# Patient Record
Sex: FEMALE | Race: WHITE | NOT HISPANIC OR LATINO | Employment: OTHER | URBAN - METROPOLITAN AREA
[De-identification: names, ages, dates, MRNs, and addresses within clinical notes are randomized per-mention and may not be internally consistent; named-entity substitution may affect disease eponyms.]

---

## 2019-03-14 ENCOUNTER — HOSPITAL ENCOUNTER (OUTPATIENT)
Dept: RADIOLOGY | Facility: MEDICAL CENTER | Age: 74
End: 2019-03-14

## 2021-11-21 ENCOUNTER — HOSPITAL ENCOUNTER (INPATIENT)
Facility: MEDICAL CENTER | Age: 76
LOS: 9 days | DRG: 552 | End: 2021-11-30
Attending: EMERGENCY MEDICINE | Admitting: INTERNAL MEDICINE
Payer: MEDICARE

## 2021-11-21 ENCOUNTER — APPOINTMENT (OUTPATIENT)
Dept: RADIOLOGY | Facility: MEDICAL CENTER | Age: 76
DRG: 552 | End: 2021-11-21
Attending: INTERNAL MEDICINE
Payer: MEDICARE

## 2021-11-21 ENCOUNTER — HOSPITAL ENCOUNTER (OUTPATIENT)
Dept: RADIOLOGY | Facility: MEDICAL CENTER | Age: 76
DRG: 552 | End: 2021-11-21
Attending: PHYSICIAN ASSISTANT
Payer: MEDICARE

## 2021-11-21 DIAGNOSIS — M54.50 ACUTE MIDLINE LOW BACK PAIN WITHOUT SCIATICA: ICD-10-CM

## 2021-11-21 DIAGNOSIS — R26.2 UNABLE TO AMBULATE: ICD-10-CM
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
## 2021-11-21 DIAGNOSIS — K59.81 OGILVIE SYNDROME: ICD-10-CM

## 2021-11-21 DIAGNOSIS — E87.1 HYPONATREMIA: ICD-10-CM

## 2021-11-21 DIAGNOSIS — M46.96 INFLAMMATORY SPONDYLOPATHY OF LUMBAR REGION (HCC): ICD-10-CM

## 2021-11-21 DIAGNOSIS — S32.010A COMPRESSION FRACTURE OF L1 VERTEBRA, INITIAL ENCOUNTER (HCC): ICD-10-CM

## 2021-11-21 DIAGNOSIS — R73.9 HYPERGLYCEMIA: ICD-10-CM

## 2021-11-21 DIAGNOSIS — R74.01 TRANSAMINITIS: ICD-10-CM

## 2021-11-21 DIAGNOSIS — M47.896 OTHER SPONDYLOSIS, LUMBAR REGION: ICD-10-CM

## 2021-11-21 DIAGNOSIS — S32.010A CLOSED COMPRESSION FRACTURE OF BODY OF L1 VERTEBRA (HCC): ICD-10-CM

## 2021-11-21 DIAGNOSIS — M43.16 SPONDYLOLISTHESIS OF LUMBAR REGION: ICD-10-CM

## 2021-11-21 DIAGNOSIS — S32.010A CLOSED WEDGE COMPRESSION FRACTURE OF L1 VERTEBRA, INITIAL ENCOUNTER (HCC): ICD-10-CM

## 2021-11-21 DIAGNOSIS — F03.90 DEMENTIA WITHOUT BEHAVIORAL DISTURBANCE, UNSPECIFIED DEMENTIA TYPE: ICD-10-CM

## 2021-11-21 DIAGNOSIS — M54.50 LOW BACK PAIN, UNSPECIFIED BACK PAIN LATERALITY, UNSPECIFIED CHRONICITY, UNSPECIFIED WHETHER SCIATICA PRESENT: ICD-10-CM

## 2021-11-21 DIAGNOSIS — M80.08XA AGE-RELATED OSTEOPOROSIS WITH CURRENT PATHOLOGICAL FRACTURE OF VERTEBRA, INITIAL ENCOUNTER (HCC): ICD-10-CM

## 2021-11-21 DIAGNOSIS — E11.9 TYPE 2 DIABETES MELLITUS WITHOUT COMPLICATION, WITHOUT LONG-TERM CURRENT USE OF INSULIN (HCC): ICD-10-CM

## 2021-11-21 PROBLEM — K59.00 CN (CONSTIPATION): Status: ACTIVE | Noted: 2021-11-21

## 2021-11-21 LAB
ALBUMIN SERPL BCP-MCNC: 4.3 G/DL (ref 3.2–4.9)
ALBUMIN/GLOB SERPL: 1.3 G/DL
ALP SERPL-CCNC: 105 U/L (ref 30–99)
ALT SERPL-CCNC: 62 U/L (ref 2–50)
ANION GAP SERPL CALC-SCNC: 15 MMOL/L (ref 7–16)
APPEARANCE UR: CLEAR
AST SERPL-CCNC: 47 U/L (ref 12–45)
BACTERIA #/AREA URNS HPF: ABNORMAL /HPF
BASOPHILS # BLD AUTO: 0.5 % (ref 0–1.8)
BASOPHILS # BLD: 0.08 K/UL (ref 0–0.12)
BILIRUB SERPL-MCNC: 0.4 MG/DL (ref 0.1–1.5)
BILIRUB UR QL STRIP.AUTO: NEGATIVE
BUN SERPL-MCNC: 43 MG/DL (ref 8–22)
CALCIUM SERPL-MCNC: 9.9 MG/DL (ref 8.4–10.2)
CHLORIDE SERPL-SCNC: 90 MMOL/L (ref 96–112)
CO2 SERPL-SCNC: 20 MMOL/L (ref 20–33)
COLOR UR: YELLOW
CREAT SERPL-MCNC: 0.87 MG/DL (ref 0.5–1.4)
EOSINOPHIL # BLD AUTO: 0.08 K/UL (ref 0–0.51)
EOSINOPHIL NFR BLD: 0.5 % (ref 0–6.9)
EPI CELLS #/AREA URNS HPF: ABNORMAL /HPF
ERYTHROCYTE [DISTWIDTH] IN BLOOD BY AUTOMATED COUNT: 41.2 FL (ref 35.9–50)
GLOBULIN SER CALC-MCNC: 3.3 G/DL (ref 1.9–3.5)
GLUCOSE SERPL-MCNC: 204 MG/DL (ref 65–99)
GLUCOSE UR STRIP.AUTO-MCNC: NEGATIVE MG/DL
HCT VFR BLD AUTO: 44.4 % (ref 37–47)
HGB BLD-MCNC: 15 G/DL (ref 12–16)
HYALINE CASTS #/AREA URNS LPF: ABNORMAL /LPF
IMM GRANULOCYTES # BLD AUTO: 0.11 K/UL (ref 0–0.11)
IMM GRANULOCYTES NFR BLD AUTO: 0.7 % (ref 0–0.9)
KETONES UR STRIP.AUTO-MCNC: ABNORMAL MG/DL
LEUKOCYTE ESTERASE UR QL STRIP.AUTO: NEGATIVE
LYMPHOCYTES # BLD AUTO: 1.7 K/UL (ref 1–4.8)
LYMPHOCYTES NFR BLD: 10.8 % (ref 22–41)
MCH RBC QN AUTO: 31.8 PG (ref 27–33)
MCHC RBC AUTO-ENTMCNC: 33.8 G/DL (ref 33.6–35)
MCV RBC AUTO: 94.1 FL (ref 81.4–97.8)
MICRO URNS: ABNORMAL
MONOCYTES # BLD AUTO: 0.92 K/UL (ref 0–0.85)
MONOCYTES NFR BLD AUTO: 5.9 % (ref 0–13.4)
MUCOUS THREADS #/AREA URNS HPF: ABNORMAL /HPF
NEUTROPHILS # BLD AUTO: 12.8 K/UL (ref 2–7.15)
NEUTROPHILS NFR BLD: 81.6 % (ref 44–72)
NITRITE UR QL STRIP.AUTO: NEGATIVE
NRBC # BLD AUTO: 0 K/UL
NRBC BLD-RTO: 0 /100 WBC
PH UR STRIP.AUTO: 5.5 [PH] (ref 5–8)
PLATELET # BLD AUTO: 495 K/UL (ref 164–446)
PMV BLD AUTO: 9 FL (ref 9–12.9)
POTASSIUM SERPL-SCNC: 3.9 MMOL/L (ref 3.6–5.5)
PROT SERPL-MCNC: 7.6 G/DL (ref 6–8.2)
PROT UR QL STRIP: 100 MG/DL
RBC # BLD AUTO: 4.72 M/UL (ref 4.2–5.4)
RBC # URNS HPF: ABNORMAL /HPF
RBC UR QL AUTO: NEGATIVE
SODIUM SERPL-SCNC: 125 MMOL/L (ref 135–145)
SP GR UR STRIP.AUTO: >=1.03
T4 FREE SERPL-MCNC: 1.51 NG/DL (ref 0.93–1.7)
TSH SERPL DL<=0.005 MIU/L-ACNC: 1.64 UIU/ML (ref 0.38–5.33)
WBC # BLD AUTO: 15.7 K/UL (ref 4.8–10.8)
WBC #/AREA URNS HPF: ABNORMAL /HPF

## 2021-11-21 PROCEDURE — 770006 HCHG ROOM/CARE - MED/SURG/GYN SEMI*

## 2021-11-21 PROCEDURE — A9270 NON-COVERED ITEM OR SERVICE: HCPCS | Performed by: INTERNAL MEDICINE

## 2021-11-21 PROCEDURE — 700102 HCHG RX REV CODE 250 W/ 637 OVERRIDE(OP): Performed by: INTERNAL MEDICINE

## 2021-11-21 PROCEDURE — 84439 ASSAY OF FREE THYROXINE: CPT

## 2021-11-21 PROCEDURE — 99220 PR INITIAL OBSERVATION CARE,LEVL III: CPT | Performed by: INTERNAL MEDICINE

## 2021-11-21 PROCEDURE — 36415 COLL VENOUS BLD VENIPUNCTURE: CPT

## 2021-11-21 PROCEDURE — 700105 HCHG RX REV CODE 258: Performed by: INTERNAL MEDICINE

## 2021-11-21 PROCEDURE — 85025 COMPLETE CBC W/AUTO DIFF WBC: CPT

## 2021-11-21 PROCEDURE — 99285 EMERGENCY DEPT VISIT HI MDM: CPT

## 2021-11-21 PROCEDURE — 81001 URINALYSIS AUTO W/SCOPE: CPT

## 2021-11-21 PROCEDURE — 72148 MRI LUMBAR SPINE W/O DYE: CPT | Mod: MH

## 2021-11-21 PROCEDURE — 71045 X-RAY EXAM CHEST 1 VIEW: CPT

## 2021-11-21 PROCEDURE — 80053 COMPREHEN METABOLIC PANEL: CPT

## 2021-11-21 PROCEDURE — 700105 HCHG RX REV CODE 258: Performed by: EMERGENCY MEDICINE

## 2021-11-21 PROCEDURE — 84443 ASSAY THYROID STIM HORMONE: CPT

## 2021-11-21 RX ORDER — METOPROLOL SUCCINATE 25 MG/1
25 TABLET, EXTENDED RELEASE ORAL DAILY
Status: DISCONTINUED | OUTPATIENT
Start: 2021-11-22 | End: 2021-11-28

## 2021-11-21 RX ORDER — ACETAMINOPHEN 500 MG
1000 TABLET ORAL EVERY 6 HOURS
Status: DISCONTINUED | OUTPATIENT
Start: 2021-11-21 | End: 2021-11-22

## 2021-11-21 RX ORDER — ACETAMINOPHEN 500 MG
1000 TABLET ORAL EVERY 6 HOURS PRN
Status: DISCONTINUED | OUTPATIENT
Start: 2021-11-26 | End: 2021-11-22

## 2021-11-21 RX ORDER — METFORMIN HYDROCHLORIDE 500 MG/1
500 TABLET, EXTENDED RELEASE ORAL 2 TIMES DAILY WITH MEALS
Status: DISCONTINUED | OUTPATIENT
Start: 2021-11-21 | End: 2021-11-30 | Stop reason: HOSPADM

## 2021-11-21 RX ORDER — DONEPEZIL HYDROCHLORIDE 5 MG/1
5 TABLET, FILM COATED ORAL NIGHTLY
COMMUNITY
End: 2022-10-21

## 2021-11-21 RX ORDER — METAXALONE 800 MG/1
800 TABLET ORAL 3 TIMES DAILY
Status: DISCONTINUED | OUTPATIENT
Start: 2021-11-21 | End: 2021-11-21

## 2021-11-21 RX ORDER — POTASSIUM CHLORIDE 1.5 G/1.58G
10 POWDER, FOR SOLUTION ORAL DAILY
Status: DISCONTINUED | OUTPATIENT
Start: 2021-11-21 | End: 2021-11-21

## 2021-11-21 RX ORDER — POLYETHYLENE GLYCOL 3350 17 G/17G
1 POWDER, FOR SOLUTION ORAL
Status: DISCONTINUED | OUTPATIENT
Start: 2021-11-21 | End: 2021-11-21

## 2021-11-21 RX ORDER — AMLODIPINE BESYLATE 5 MG/1
5 TABLET ORAL
Status: DISCONTINUED | OUTPATIENT
Start: 2021-11-21 | End: 2021-11-30 | Stop reason: HOSPADM

## 2021-11-21 RX ORDER — MECLIZINE HYDROCHLORIDE 25 MG/1
25 TABLET ORAL 3 TIMES DAILY PRN
COMMUNITY
Start: 2021-10-06 | End: 2022-01-13 | Stop reason: SDUPTHER

## 2021-11-21 RX ORDER — HYDROCODONE BITARTRATE AND ACETAMINOPHEN 5; 325 MG/1; MG/1
1 TABLET ORAL EVERY 6 HOURS PRN
Status: ON HOLD | COMMUNITY
Start: 2021-11-20 | End: 2021-11-30

## 2021-11-21 RX ORDER — ONDANSETRON 4 MG/1
4 TABLET, ORALLY DISINTEGRATING ORAL EVERY 4 HOURS PRN
Status: DISCONTINUED | OUTPATIENT
Start: 2021-11-21 | End: 2021-11-30 | Stop reason: HOSPADM

## 2021-11-21 RX ORDER — BISACODYL 10 MG
10 SUPPOSITORY, RECTAL RECTAL
Status: DISCONTINUED | OUTPATIENT
Start: 2021-11-21 | End: 2021-11-21

## 2021-11-21 RX ORDER — LABETALOL HYDROCHLORIDE 5 MG/ML
10 INJECTION, SOLUTION INTRAVENOUS EVERY 4 HOURS PRN
Status: DISCONTINUED | OUTPATIENT
Start: 2021-11-21 | End: 2021-11-30 | Stop reason: HOSPADM

## 2021-11-21 RX ORDER — BISACODYL 10 MG
10 SUPPOSITORY, RECTAL RECTAL
Status: DISCONTINUED | OUTPATIENT
Start: 2021-11-21 | End: 2021-11-26

## 2021-11-21 RX ORDER — POLYETHYLENE GLYCOL 3350 17 G/17G
1 POWDER, FOR SOLUTION ORAL
Status: DISCONTINUED | OUTPATIENT
Start: 2021-11-21 | End: 2021-11-26

## 2021-11-21 RX ORDER — AMOXICILLIN 250 MG
2 CAPSULE ORAL 2 TIMES DAILY
Status: DISCONTINUED | OUTPATIENT
Start: 2021-11-21 | End: 2021-11-21

## 2021-11-21 RX ORDER — OXYCODONE HYDROCHLORIDE 5 MG/1
2.5 TABLET ORAL
Status: DISCONTINUED | OUTPATIENT
Start: 2021-11-21 | End: 2021-11-26

## 2021-11-21 RX ORDER — FUROSEMIDE 40 MG/1
20 TABLET ORAL DAILY
Status: DISCONTINUED | OUTPATIENT
Start: 2021-11-21 | End: 2021-11-21

## 2021-11-21 RX ORDER — GLIPIZIDE 5 MG/1
5 TABLET ORAL 2 TIMES DAILY
COMMUNITY
End: 2022-02-14 | Stop reason: SDUPTHER

## 2021-11-21 RX ORDER — ONDANSETRON 2 MG/ML
4 INJECTION INTRAMUSCULAR; INTRAVENOUS EVERY 4 HOURS PRN
Status: DISCONTINUED | OUTPATIENT
Start: 2021-11-21 | End: 2021-11-30 | Stop reason: HOSPADM

## 2021-11-21 RX ORDER — SODIUM CHLORIDE 9 MG/ML
INJECTION, SOLUTION INTRAVENOUS CONTINUOUS
Status: DISCONTINUED | OUTPATIENT
Start: 2021-11-21 | End: 2021-11-25

## 2021-11-21 RX ORDER — SIMVASTATIN 20 MG
40 TABLET ORAL NIGHTLY
Status: DISCONTINUED | OUTPATIENT
Start: 2021-11-21 | End: 2021-11-21

## 2021-11-21 RX ORDER — POTASSIUM CHLORIDE 750 MG/1
TABLET, FILM COATED, EXTENDED RELEASE ORAL
COMMUNITY
Start: 2021-11-01 | End: 2022-01-13 | Stop reason: SDUPTHER

## 2021-11-21 RX ORDER — METFORMIN HYDROCHLORIDE 500 MG/1
500-1500 TABLET, EXTENDED RELEASE ORAL 2 TIMES DAILY
COMMUNITY
Start: 2021-10-16 | End: 2022-02-22 | Stop reason: SDUPTHER

## 2021-11-21 RX ORDER — ENALAPRILAT 1.25 MG/ML
1.25 INJECTION INTRAVENOUS EVERY 6 HOURS PRN
Status: DISCONTINUED | OUTPATIENT
Start: 2021-11-21 | End: 2021-11-30 | Stop reason: HOSPADM

## 2021-11-21 RX ORDER — IBUPROFEN 200 MG
200 TABLET ORAL 2 TIMES DAILY
Status: ON HOLD | COMMUNITY
End: 2021-11-30

## 2021-11-21 RX ORDER — METAXALONE 800 MG/1
800 TABLET ORAL 3 TIMES DAILY
Status: DISCONTINUED | OUTPATIENT
Start: 2021-11-21 | End: 2021-11-30 | Stop reason: HOSPADM

## 2021-11-21 RX ORDER — OXYCODONE HYDROCHLORIDE 5 MG/1
5 TABLET ORAL
Status: DISCONTINUED | OUTPATIENT
Start: 2021-11-21 | End: 2021-11-26

## 2021-11-21 RX ORDER — AMOXICILLIN 250 MG
2 CAPSULE ORAL 2 TIMES DAILY
Status: DISCONTINUED | OUTPATIENT
Start: 2021-11-21 | End: 2021-11-26

## 2021-11-21 RX ORDER — SODIUM CHLORIDE 9 MG/ML
1000 INJECTION, SOLUTION INTRAVENOUS ONCE
Status: COMPLETED | OUTPATIENT
Start: 2021-11-21 | End: 2021-11-21

## 2021-11-21 RX ORDER — HYDROMORPHONE HYDROCHLORIDE 1 MG/ML
0.25 INJECTION, SOLUTION INTRAMUSCULAR; INTRAVENOUS; SUBCUTANEOUS
Status: DISCONTINUED | OUTPATIENT
Start: 2021-11-21 | End: 2021-11-26

## 2021-11-21 RX ADMIN — AMLODIPINE BESYLATE 5 MG: 5 TABLET ORAL at 20:20

## 2021-11-21 RX ADMIN — ACETAMINOPHEN 1000 MG: 500 TABLET, FILM COATED ORAL at 17:09

## 2021-11-21 RX ADMIN — METAXALONE 800 MG: 800 TABLET ORAL at 12:11

## 2021-11-21 RX ADMIN — ACETAMINOPHEN 1000 MG: 500 TABLET, FILM COATED ORAL at 12:11

## 2021-11-21 RX ADMIN — METFORMIN HYDROCHLORIDE 500 MG: 500 TABLET, EXTENDED RELEASE ORAL at 17:09

## 2021-11-21 RX ADMIN — ACETAMINOPHEN 1000 MG: 500 TABLET, FILM COATED ORAL at 23:22

## 2021-11-21 RX ADMIN — METAXALONE 800 MG: 800 TABLET ORAL at 17:22

## 2021-11-21 RX ADMIN — POLYETHYLENE GLYCOL 3350 1 PACKET: 17 POWDER, FOR SOLUTION ORAL at 20:19

## 2021-11-21 RX ADMIN — SENNOSIDES AND DOCUSATE SODIUM 2 TABLET: 50; 8.6 TABLET ORAL at 17:09

## 2021-11-21 RX ADMIN — SODIUM CHLORIDE 1000 ML: 9 INJECTION, SOLUTION INTRAVENOUS at 10:35

## 2021-11-21 RX ADMIN — SODIUM CHLORIDE: 9 INJECTION, SOLUTION INTRAVENOUS at 12:55

## 2021-11-21 ASSESSMENT — COGNITIVE AND FUNCTIONAL STATUS - GENERAL
MOVING FROM LYING ON BACK TO SITTING ON SIDE OF FLAT BED: A LITTLE
HELP NEEDED FOR BATHING: A LITTLE
TURNING FROM BACK TO SIDE WHILE IN FLAT BAD: A LITTLE
STANDING UP FROM CHAIR USING ARMS: A LITTLE
TOILETING: A LITTLE
MOVING TO AND FROM BED TO CHAIR: A LITTLE
CLIMB 3 TO 5 STEPS WITH RAILING: A LOT
DAILY ACTIVITIY SCORE: 21
SUGGESTED CMS G CODE MODIFIER DAILY ACTIVITY: CJ
DRESSING REGULAR LOWER BODY CLOTHING: A LITTLE
WALKING IN HOSPITAL ROOM: A LITTLE
MOBILITY SCORE: 17
SUGGESTED CMS G CODE MODIFIER MOBILITY: CK

## 2021-11-21 ASSESSMENT — ENCOUNTER SYMPTOMS
INSOMNIA: 0
HEADACHES: 0
TINGLING: 0
DEPRESSION: 0
DIARRHEA: 0
FEVER: 0
COUGH: 0
CONSTIPATION: 1
SHORTNESS OF BREATH: 0
BLURRED VISION: 0
WEAKNESS: 0
CLAUDICATION: 0
FALLS: 1
PHOTOPHOBIA: 0
BACK PAIN: 1
HEARTBURN: 0
VOMITING: 0
SENSORY CHANGE: 0
SORE THROAT: 0
MYALGIAS: 1
DIZZINESS: 0
CHILLS: 0
SPEECH CHANGE: 0
NERVOUS/ANXIOUS: 0
ABDOMINAL PAIN: 0
NAUSEA: 0
FOCAL WEAKNESS: 0
MEMORY LOSS: 1

## 2021-11-21 ASSESSMENT — PAIN DESCRIPTION - PAIN TYPE
TYPE: ACUTE PAIN

## 2021-11-21 NOTE — ASSESSMENT & PLAN NOTE
UA and CXR both negative earlier during hospitalization  SBO resolved, should not be contributing  Check procalcitonin and CXR given mild hypoxia

## 2021-11-21 NOTE — ASSESSMENT & PLAN NOTE
Patient has chronic constipation and per the daughter has not had a bowel movement in at least 4 days but possibly longer as she just picked her up 4 days ago from [place].   Given suppository 11/23 and had bowel movements

## 2021-11-21 NOTE — ED NOTES
Pt and pts family are not sure the strength of her medications.  Per daughter is going to go home and gets pts medications.

## 2021-11-21 NOTE — DISCHARGE PLANNING
ER CM met with  Patient and her daughter at bedside. She was living in [facility] with spouse. Her daughter has brought her to her home for recovery. She has no PCP at present. She has back pain. She was not eating well. She is weak. Home in [place] that she will go to is [address] [place]. Address is [address] [place], [place] [address] [place] [address]. She has a FWW. She was seen at [facility] and ordered a wheelchair. No O2 or cpap at home. RX [facility] [address] preferred. Her daughter Jennifer is exceptionally supportive. Phone number for her is [phone] She also provides number for Lauryn  she is a gyn MD and Son in law Jean Carlos Dunlap  a onc surgeon. She has ordered lift recliners as well.   Care Transition Team Assessment    Information Source  Orientation Level: Oriented X4  Information Given By: Patient,Relative  Informant's Name: Stacy/Jennifer  Who is responsible for making decisions for patient? : Patient         Elopement Risk  Legal Hold: No  Ambulatory or Self Mobile in Wheelchair: No-Not an Elopement Risk    Interdisciplinary Discharge Planning  Does Admitting Nurse Feel This Could be a Complex Discharge?: Yes  Primary Care Physician:  (NONE)  Lives with - Patient's Self Care Capacity: Adult Children  Support Systems: Children  Housing / Facility: [address] [facility] ([address] [place] [address] [address] [place] [address])  Do You Take your Prescribed Medications Regularly: Yes  Able to Return to Previous ADL's: Future Time w/Therapy  Prior Services: None  Patient Prefers to be Discharged to:: Daughters home address above  Assistance Needed: Yes  Durable Medical Equipment: Walker,Other - Specify (Wheelchair ordered by [facility])    Discharge Preparedness  What is your plan after discharge?: Uncertain - pending medical team collaboration,Home with help  What are your discharge supports?: Child         Finances  Prescription Coverage: Yes ([facility])                   Domestic Abuse  Have you ever been the victim of  abuse or violence?: No         Discharge Risks or Barriers  Discharge risks or barriers?: No PCP    Anticipated Discharge Information  Discharge Disposition: Still a Patient (30)

## 2021-11-21 NOTE — CARE PLAN
The patient is Stable - Low risk of patient condition declining or worsening    Shift Goals  Clinical Goals: safety, pain managment   Patient Goals: rest, comfort     Progress made toward(s) clinical / shift goals:  Safety precautions in place, pt states pain is well controlled at this time.     Patient is not progressing towards the following goals:

## 2021-11-21 NOTE — ED NOTES
Pt and pts daughter are not sure the strength of her medications. I will call [facility] @ [phone] tomorrow when they open to verify medications.   Per daughter had pt NORCO 5-325MG at bedside that her son just picked up at the pharmacy.  Pt had took a 7.5-325MG today @ 0700.  Pts daughter reports no antibiotics in the last 30 days.

## 2021-11-21 NOTE — PROGRESS NOTES
Med rec updated and complete  Allergies reviewed  Pts daughter brought in pts RX bottles, went over RX bottles and returned RX bottles back to pts bin.  Pt and pts daughter are not sure if she is taking POTASSIUM 10 MEQ.  Per daughter had pt NORCO 5-325MG at bedside that her son just picked up at the pharmacy, pt has not taken 5-325MG.  Pt had took a 7.5-325MG today @ 0700  Pts daughter reports no antibiotics in the last 30 days.    No current facility-administered medications on file prior to encounter.     Current Outpatient Medications on File Prior to Encounter   Medication Sig Dispense Refill   • glipiZIDE (GLUCOTROL) 5 MG Tab Take 5 mg by mouth 2 times a day.     • donepezil (ARICEPT) 5 MG Tab Take 5 mg by mouth every evening.     • ibuprofen (MOTRIN) 200 MG Tab Take 200 mg by mouth 2 times a day.     • Apoaequorin (PREVAGEN PO) Take 1 Tablet by mouth every day.     • potassium chloride ER (KLOR-CON) 10 MEQ tablet      • meclizine (ANTIVERT) 25 MG Tab Take 25 mg by mouth 3 times a day as needed for Nausea/Vomiting.     • HYDROcodone-acetaminophen (NORCO) 5-325 MG Tab per tablet Take 1 Tablet by mouth every 6 hours as needed. Indications: Pain     • metFORMIN ER (GLUCOPHAGE XR) 500 MG TABLET SR 24 HR Take 500-1,500 mg by mouth 2 times a day. Pt takes 500MG in AM and 1500MG every evening     • hydrocodone-acetaminophen (NORCO) 7.5-325 MG per tablet Take 1-2 Tabs by mouth every 6 hours as needed for Moderate Pain. 50 Tab 0   • B Complex Vitamins (B COMPLEX 1 PO) Take 1 Tablet by mouth every day.     • furosemide (LASIX) 20 MG Tab Take 20 mg by mouth as needed. Indications: Edema     • amlodipine (NORVASC) 5 MG Tab Take 5 mg by mouth every bedtime.     • metoprolol SR (TOPROL XL) 25 MG TABLET SR 24 HR Take 25 mg by mouth every day.

## 2021-11-21 NOTE — ASSESSMENT & PLAN NOTE
Patient reports history of right-sided breast cancer status post mastectomy and this is in remission.  Radiation had diagnosis initially 18 years ago first and then did have a recurrence and now she is status post radiation and surgical intervention

## 2021-11-21 NOTE — H&P
Hospital Medicine History & Physical Note    Date of Service  11/21/2021    Primary Care Physician  Pcp Pt States None    Consultants  none    Specialist Names: [facility], Kimberly MUÑOZ coordinating kyphoplasty outpatient with Dr Pino - she will be in contact with daughter.    Code Status  Full Code    Chief Complaint  Chief Complaint   Patient presents with   • T-5000 FALL   • Low Back Pain       History of Presenting Illness  Stacy Kelley is a 76 y.o. female who presented 11/21/2021 with uncontrolled low back pain.  Patient had a fall at her home in [place] on Wednesday am and has been in pain since.  She was seen by her PCP and X-rays confirmed compression fracture.  She followed up at [facility] with Kimberly the nurse practitioner and MRI was done this am which shows an L1 compression fracture with 25% height loss.  Patient's pain and weakness are not controlled and she is unsafe at home at this time.  She has off the shelf of TLSO in place which hasn't changed her pain.  She has been taking norco as prescribed which helps the pain but also increases her confusion.  She hasn't been eating or drinking like usual either because of pain and labs are showing sodium of 125. I will start her on IV fluids for her dehydration and constipation.  I will start her on scheduled tylenol for pain and skelaxin for muscle relaxation while the orthopedic clinic is coordinating the kyphoplasty intervention.      I discussed the plan of care with patient and family.    Review of Systems  Review of Systems   Constitutional: Negative for chills and fever.   HENT: Negative for congestion and sore throat.    Eyes: Negative for blurred vision and photophobia.   Respiratory: Negative for cough and shortness of breath.    Cardiovascular: Negative for chest pain, claudication and leg swelling.   Gastrointestinal: Positive for constipation. Negative for abdominal pain, diarrhea, heartburn, nausea and vomiting.    Genitourinary: Negative for dysuria and hematuria.   Musculoskeletal: Positive for back pain and myalgias. Negative for joint pain.   Skin: Negative for itching and rash.   Neurological: Negative for dizziness, sensory change, speech change, weakness and headaches.   Psychiatric/Behavioral: Positive for memory loss. Negative for depression. The patient is not nervous/anxious and does not have insomnia.        Past Medical History   has a past medical history of Anesthesia, Arthritis, Cancer (HCC) (3/2012), Heart burn, Hepatitis (as child), Hypertension, Indigestion, Unspecified disorder of thyroid, and Unspecified urinary incontinence.    Surgical History   has a past surgical history that includes thyroid lobectomy (1982); abdominal hysterectomy total (1980); tubal ligation (1972); lumpectomy (1997); mastectomy (Bilateral, 2012); breast reconstruction (Bilateral); and bunionectomy (Right, 9/14/2015).     Family History  family history includes Diabetes in her brother and father; Heart Disease in an other family member; Hypertension in her mother; Stroke in her mother.   Family history reviewed with patient. There is no family history that is pertinent to the chief complaint.     Social History   reports that she quit smoking about 24 years ago. She has a 20.00 pack-year smoking history. She has never used smokeless tobacco. She reports current alcohol use. She reports that she does not use drugs.    Allergies  No Known Allergies    Medications  Prior to Admission Medications   Prescriptions Last Dose Informant Patient Reported? Taking?   B Complex Vitamins (B COMPLEX 1 PO)  Family Member Yes No   Sig: Take  by mouth.   Glucosamine-Chondroitin (MOVE FREE PO)  Family Member Yes No   Sig: Take  by mouth every day.   Probiotic Product (PROBIOTIC DAILY PO)  Family Member Yes No   Sig: Take  by mouth.   amlodipine (NORVASC) 5 MG Tab  Family Member Yes No   Sig: Take 5 mg by mouth every bedtime.   aspirin EC (ECOTRIN)  81 MG Tablet Delayed Response  Family Member Yes No   Sig: Take 81 mg by mouth every day.   diphenhydrAMINE (BENADRYL) 25 MG Tab  Family Member Yes No   Sig: Take 25 mg by mouth at bedtime as needed for Sleep.   furosemide (LASIX) 20 MG Tab  Family Member Yes No   Sig: Take 20 mg by mouth every day.   hydrocodone-acetaminophen (NORCO) 7.5-325 MG per tablet  Family Member No No   Sig: Take 1-2 Tabs by mouth every 6 hours as needed for Moderate Pain.   metformin (GLUCOPHAGE) 500 MG Tab  Family Member Yes No   Sig: Take 500 mg by mouth 2 times a day, with meals.   metoprolol SR (TOPROL XL) 25 MG TABLET SR 24 HR  Family Member Yes No   Sig: Take 25 mg by mouth every day.   potassium chloride (KLOR-CON) 20 MEQ Pack  Family Member Yes No   Sig: Take 10 mEq by mouth every day.   simvastatin (ZOCOR) 40 MG Tab  Family Member Yes No   Sig: Take 40 mg by mouth every evening.   vitamin D (CHOLECALCIFEROL) 1000 UNIT Tab  Family Member Yes No   Sig: Take 1,000 Units by mouth every day.      Facility-Administered Medications: None       Physical Exam  Temp:  [36.4 °C (97.5 °F)] 36.4 °C (97.5 °F)  Pulse:  [] 94  Resp:  [20] 20  BP: (136-169)/(67-81) 154/67  SpO2:  [89 %-94 %] 92 %  Blood Pressure : 154/67   Temperature: 36.4 °C (97.5 °F)   Pulse: 94   Respiration: 20   Pulse Oximetry: 92 %       Physical Exam  Vitals and nursing note reviewed.   Constitutional:       General: She is not in acute distress.     Appearance: Normal appearance. She is not ill-appearing.   HENT:      Head: Normocephalic and atraumatic.      Nose: Nose normal.   Eyes:      General: No scleral icterus.  Cardiovascular:      Rate and Rhythm: Normal rate and regular rhythm.      Heart sounds: Normal heart sounds. No murmur heard.      Pulmonary:      Effort: Pulmonary effort is normal. No respiratory distress.      Breath sounds: Normal breath sounds. No wheezing or rales.      Comments: TLSO in place, auscultated around brace    Abdominal:       General: Bowel sounds are normal. There is no distension.      Palpations: Abdomen is soft.   Musculoskeletal:         General: No swelling or tenderness.      Cervical back: Neck supple.      Right lower leg: No edema.      Left lower leg: No edema.   Skin:     General: Skin is warm and dry.   Neurological:      General: No focal deficit present.      Mental Status: She is alert and oriented to person, place, and time.   Psychiatric:         Mood and Affect: Mood normal.         Laboratory:  Recent Labs     11/21/21  0933   WBC 15.7*   RBC 4.72   HEMOGLOBIN 15.0   HEMATOCRIT 44.4   MCV 94.1   MCH 31.8   MCHC 33.8   RDW 41.2   PLATELETCT 495*   MPV 9.0     Recent Labs     11/21/21  0933   SODIUM 125*   POTASSIUM 3.9   CHLORIDE 90*   CO2 20   GLUCOSE 204*   BUN 43*   CREATININE 0.87   CALCIUM 9.9     Recent Labs     11/21/21  0933   ALTSGPT 62*   ASTSGOT 47*   ALKPHOSPHAT 105*   TBILIRUBIN 0.4   GLUCOSE 204*         No results for input(s): NTPROBNP in the last 72 hours.      No results for input(s): TROPONINT in the last 72 hours.    Imaging:  DX-CHEST-PORTABLE (1 VIEW)   Final Result      No acute cardiopulmonary abnormality identified.          X-Ray:  I have personally reviewed the images and compared with prior images.    Assessment/Plan:  I anticipate this patient will require at least two midnights for appropriate medical management, necessitating inpatient admission.    * Acute midline low back pain without sciatica  Assessment & Plan  S/p fall 4 days prior  L1 compression fracture  Scheduled Tylenol every 6 hours ATC  PRN narcotics - minimize as able  Skelaxin scheduled      Closed compression fracture of body of L1 vertebra (HCC)  Assessment & Plan  Dr Pino with [facility] planning on outpatient Kyphoplasty, - ERP spoke with Kimberly MUÑOZ    Hyponatremia  Assessment & Plan  Poor PO intake due to pain  Normal Saline in ER and to continue       Leukocytosis  Assessment & Plan  No signs of acute  infection  UA normal  CXR without signs of pneumonia  Likely related to constipation      Hypertension  Assessment & Plan  Resume home medications - doses to be confirmed once daughter returns home.       CN (constipation)  Assessment & Plan  No bowel movement x 4 days with narcotics for back pain  Bowel protocol  IV hydration      Cancer (HCC)  Assessment & Plan  History of breast cancer          VTE prophylaxis: SCDs/TEDs and enoxaparin ppx

## 2021-11-21 NOTE — ED TRIAGE NOTES
"Pt is accompanied by her daughter.  Approximately 3 days ago she experienced a GLF with consequent back injury.  An MRI of the lumbar region was completed this AM.  Continues to C/O escalating pain.   Chief Complaint   Patient presents with   • T-5000 FALL   • Back Pain     /80   Pulse 100   Temp 36.4 °C (97.5 °F) (Temporal)   Resp 20   Ht 1.626 m (5' 4\")   Wt 85 kg (187 lb 6.3 oz)   SpO2 94%   BMI 32.17 kg/m²   Has this patient been vaccinated for COVID YES    "

## 2021-11-21 NOTE — ED NOTES
Room assignment recvd-update to pt and dtr. Med per admit order,  Sugar free jello provided at this time

## 2021-11-21 NOTE — ASSESSMENT & PLAN NOTE
Outpatient referral to Neurosurgery is in place  Avoid narcotics with Gould's syndrome - Skelaxin, Tylenol and Lidoderm  Multimodal pain managements  PT/OT rec SNF placement, referred - has a bed at [facility], watching pt today with first day of regular diet per GI, can likely go to [facility] tomorrow. Will check a COVID today for placement as well.

## 2021-11-21 NOTE — ASSESSMENT & PLAN NOTE
Changed fluids to NS with K+ supplementation with some improvement overnight - has some rales on exam today - pt states she is very hungry and able to eat and drink, will hold IVFs for now.

## 2021-11-21 NOTE — ED PROVIDER NOTES
"ED Provider Note    ED Provider Note    Primary care provider: Pcp Pt States None  Means of arrival: POV  History obtained from: patient, daughter, granddaughter  History limited by: dementia    CHIEF COMPLAINT  Chief Complaint   Patient presents with   • T-5000 FALL   • Low Back Pain       HPI  Stacy Kelley is a 76 y.o. female who presents to the Emergency Department accompanied by her daughter with a chief complaint of inability to ambulate, lay flat or complete her activities of daily living.  Patient was previously living with her  out in [facility]. She was independent.  Family believes that she fell on Wednesday although the history is limited due to the patient's history of dementia.  Since then, she has been unable to ambulate.  Daughter has taken her to be seen at the [facility] urgent care.  She initially had plain films which showed a compression fracture and was referred for MRI this morning.  MRI was done at a [facility] facility and results are available in the computer.  She has evidence of spondylolisthesis as well as a acute compression fracture of L1.  Patient has not had a bowel movement for 2 to 3 days which family attributes to her being on Percocet.  Her daughter has since, moved her into her home here in [place] and tried to manage her multiple medications which the patient, now is unable to identify what she takes and daughter believes that this is likely not new but something she is struggled with for months now.  On the phone, during our exam and interview is the patient's granddaughter who is a physician in [place].  Family is requesting admission to the hospital for \"failure to thrive\" she is reportedly not eating and in danger of falling at home.  She cannot ambulate on her own, family cannot move her in and out of a chair or in and out of the car. Patient is vaccinated against Covid including a booster.    REVIEW OF SYSTEMS  Review of Systems   Unable to perform ROS: Dementia " "  Constitutional: Negative for fever.   HENT: Negative for congestion.    Respiratory: Negative for cough.    Gastrointestinal: Positive for constipation. Negative for vomiting.   Musculoskeletal: Positive for back pain and falls.   Neurological: Negative for tingling and focal weakness.   All other systems reviewed and are negative.      PAST MEDICAL HISTORY   has a past medical history of Anesthesia, Arthritis, Cancer (HCC) (3/2012), Heart burn, Hepatitis (as child), Hypertension, Indigestion, Unspecified disorder of thyroid, and Unspecified urinary incontinence.    SURGICAL HISTORY   has a past surgical history that includes thyroid lobectomy (); abdominal hysterectomy total (); tubal ligation (); lumpectomy (); mastectomy (Bilateral, ); breast reconstruction (Bilateral); and bunionectomy (Right, 2015).    SOCIAL HISTORY  Social History     Tobacco Use   • Smoking status: Former Smoker     Packs/day: 1.00     Years: 20.00     Pack years: 20.00     Quit date: 1997     Years since quittin.9   • Smokeless tobacco: Never Used   Substance Use Topics   • Alcohol use: Yes     Comment: 2 per week   • Drug use: No      Social History     Substance and Sexual Activity   Drug Use No       FAMILY HISTORY  Family History   Problem Relation Age of Onset   • Heart Disease Other    • Hypertension Mother    • Stroke Mother    • Diabetes Father    • Diabetes Brother        CURRENT MEDICATIONS  Home Medications    **Home medications have not yet been reviewed for this encounter**         ALLERGIES  No Known Allergies    PHYSICAL EXAM  VITAL SIGNS: /67   Pulse 94   Temp 36.4 °C (97.5 °F) (Temporal)   Resp 20   Ht 1.626 m (5' 4\")   Wt 85 kg (187 lb 6.3 oz)   SpO2 92%   BMI 32.17 kg/m²   Vitals reviewed.  Constitutional: Patient is oriented to person and place. Appears well-developed and well-nourished. No distress.    Head: Normocephalic and atraumatic.   Ears: Normal external ears " bilaterally.   Mouth/Throat: Oropharynx is clear and moist. Mask in place  Eyes: Conjunctivae are normal. Pupils are equal and round.  Neck: Normal range of motion. Neck supple.  Cardiovascular: Normal rate, regular rhythm and normal heart sounds. Normal peripheral pulses.  Pulmonary/Chest: Effort normal and breath sounds normal. No respiratory distress, no wheezes, rhonchi, or rales. brace in place, trunck  Abdominal: Soft. Bowel sounds are normal. There is no tenderness. No rebound or guarding, or peritoneal signs. No CVA tenderness.  Musculoskeletal: No edema and no tenderness, except as noted. TTP of lumbar spine.   Neurological: No focal deficits. normal strength/sensation.  Skin: Skin is warm and dry. No erythema. No pallor.   Psychiatric: Patient has a normal mood and affect.     LABS  Results for orders placed or performed during the hospital encounter of 11/21/21   CBC WITH DIFFERENTIAL   Result Value Ref Range    WBC 15.7 (H) 4.8 - 10.8 K/uL    RBC 4.72 4.20 - 5.40 M/uL    Hemoglobin 15.0 12.0 - 16.0 g/dL    Hematocrit 44.4 37.0 - 47.0 %    MCV 94.1 81.4 - 97.8 fL    MCH 31.8 27.0 - 33.0 pg    MCHC 33.8 33.6 - 35.0 g/dL    RDW 41.2 35.9 - 50.0 fL    Platelet Count 495 (H) 164 - 446 K/uL    MPV 9.0 9.0 - 12.9 fL    Neutrophils-Polys 81.60 (H) 44.00 - 72.00 %    Lymphocytes 10.80 (L) 22.00 - 41.00 %    Monocytes 5.90 0.00 - 13.40 %    Eosinophils 0.50 0.00 - 6.90 %    Basophils 0.50 0.00 - 1.80 %    Immature Granulocytes 0.70 0.00 - 0.90 %    Nucleated RBC 0.00 /100 WBC    Neutrophils (Absolute) 12.80 (H) 2.00 - 7.15 K/uL    Lymphs (Absolute) 1.70 1.00 - 4.80 K/uL    Monos (Absolute) 0.92 (H) 0.00 - 0.85 K/uL    Eos (Absolute) 0.08 0.00 - 0.51 K/uL    Baso (Absolute) 0.08 0.00 - 0.12 K/uL    Immature Granulocytes (abs) 0.11 0.00 - 0.11 K/uL    NRBC (Absolute) 0.00 K/uL   CMP   Result Value Ref Range    Sodium 125 (L) 135 - 145 mmol/L    Potassium 3.9 3.6 - 5.5 mmol/L    Chloride 90 (L) 96 - 112 mmol/L    Co2  20 20 - 33 mmol/L    Anion Gap 15.0 7.0 - 16.0    Glucose 204 (H) 65 - 99 mg/dL    Bun 43 (H) 8 - 22 mg/dL    Creatinine 0.87 0.50 - 1.40 mg/dL    Calcium 9.9 8.4 - 10.2 mg/dL    AST(SGOT) 47 (H) 12 - 45 U/L    ALT(SGPT) 62 (H) 2 - 50 U/L    Alkaline Phosphatase 105 (H) 30 - 99 U/L    Total Bilirubin 0.4 0.1 - 1.5 mg/dL    Albumin 4.3 3.2 - 4.9 g/dL    Total Protein 7.6 6.0 - 8.2 g/dL    Globulin 3.3 1.9 - 3.5 g/dL    A-G Ratio 1.3 g/dL   URINALYSIS    Specimen: Urine, Cath   Result Value Ref Range    Micro Urine Req Microscopic    TSH   Result Value Ref Range    TSH 1.640 0.380 - 5.330 uIU/mL   FREE THYROXINE   Result Value Ref Range    Free T-4 1.51 0.93 - 1.70 ng/dL   ESTIMATED GFR   Result Value Ref Range    GFR If African American >60 >60 mL/min/1.73 m 2    GFR If Non African American >60 >60 mL/min/1.73 m 2       All labs reviewed by me.    RADIOLOGY  No orders to display     The radiologist's interpretation of all radiological studies have been reviewed by me.    COURSE & MEDICAL DECISION MAKING  Pertinent Labs & Imaging studies reviewed. (See chart for details)    Obtained and reviewed past medical records.  MRI noted from this morning.  Patient has an acute L1 vertebral body superior endplate compression fracture with approximately 25% loss of height.  No significant retropulsion.  There is grade 2 anterolisthesis of L4 on L5.  There is minimal posterior disc bulge at L3-4 and L4-5.  There is a 3.4 cm right adrenal mass.  It appears, this was previously seen.  Multilevel degenerative changes of the lumbar spine.    8:37 AM - Patient seen and examined at bedside.  This is a 76-year-old female.  She has a history of dementia.   she reportedly, had a fall on Wednesday approximately.  Now she is incapable, of completing her activities of daily living.  She cannot walk on her own or get out of a chair.  Family has moved her into their home.  Outpatient work-up with x-ray and MRI done this morning, reveal  compression fracture of L1 as well as anterolisthesis, multilevel degenerative changes.      10:45 AM patient is reevaluated at the bedside.  Her daughter remains at the bedside.  I have relayed to her my conversation with Kimberly, the nurse practitioner who, order the MRI this morning.  Plan for patient to be admitted to the hospitalist and I have spoken with Dr. Lombardi who agrees to admit her.  She did a few abnormalities in her labs including hyperglycemia, hyponatremia and transaminitis.  NS infusion has been initiated.  There are some other findings on MRI as well mostly consistent with arthritis.  Per discussion with Kimberly, Dr. Pino from their facility will plan to perform kyphoplasty though not on an emergent basis.  Plan for admission to this facility currently and tomorrow, Kimberly will assist with planning the kyphoplasty at a later date.  Patient and family agreeable to plan of care.    Patient is admitted in stable condition.    FINAL IMPRESSION  1. Acute midline low back pain without sciatica    2. Compression fracture of L1 vertebra, initial encounter ([facility])    3. Fall, initial encounter    4. Dementia without behavioral disturbance, unspecified dementia type ([facility])    5. Hyponatremia    6. Hyperglycemia    7. Transaminitis    8. Unable to ambulate

## 2021-11-21 NOTE — ASSESSMENT & PLAN NOTE
Monitor blood pressure and adjust medications to keep systolic blood pressure less than 140 diastolic under 90.  Continue with Norvasc 5 mg daily  Increased Metoprolol XL yesterday to 50mg with good effect

## 2021-11-21 NOTE — PROGRESS NOTES
COVID 19 surge in effect.     Received report from ER. Pt up to room 204-1 via [place], assisted to hospital bed. AAOx4, VSS. Pt states pain 4/10, denies intervention at this time. Pt oriented to room and updated on plan of care for the day. Answered any questions. Safety precautions in place, pt educated to call for assistance.

## 2021-11-21 NOTE — ASSESSMENT & PLAN NOTE
Low midline back pain is secondary to L1 compression  Continue with multimodal pain managements, lidocaine patch.   Avoid narcotics as possible

## 2021-11-22 ENCOUNTER — APPOINTMENT (OUTPATIENT)
Dept: RADIOLOGY | Facility: MEDICAL CENTER | Age: 76
DRG: 552 | End: 2021-11-22
Attending: HOSPITALIST
Payer: MEDICARE

## 2021-11-22 PROBLEM — K56.609 BOWEL OBSTRUCTION (HCC): Status: ACTIVE | Noted: 2021-11-22

## 2021-11-22 LAB
ALBUMIN SERPL BCP-MCNC: 3.7 G/DL (ref 3.2–4.9)
ALBUMIN/GLOB SERPL: 1.5 G/DL
ALP SERPL-CCNC: 101 U/L (ref 30–99)
ALT SERPL-CCNC: 63 U/L (ref 2–50)
ANION GAP SERPL CALC-SCNC: 15 MMOL/L (ref 7–16)
AST SERPL-CCNC: 40 U/L (ref 12–45)
BILIRUB SERPL-MCNC: 0.6 MG/DL (ref 0.1–1.5)
BUN SERPL-MCNC: 27 MG/DL (ref 8–22)
CALCIUM SERPL-MCNC: 8.8 MG/DL (ref 8.4–10.2)
CHLORIDE SERPL-SCNC: 97 MMOL/L (ref 96–112)
CO2 SERPL-SCNC: 19 MMOL/L (ref 20–33)
CREAT SERPL-MCNC: 0.54 MG/DL (ref 0.5–1.4)
ERYTHROCYTE [DISTWIDTH] IN BLOOD BY AUTOMATED COUNT: 41.6 FL (ref 35.9–50)
GLOBULIN SER CALC-MCNC: 2.5 G/DL (ref 1.9–3.5)
GLUCOSE SERPL-MCNC: 158 MG/DL (ref 65–99)
HCT VFR BLD AUTO: 41.4 % (ref 37–47)
HGB BLD-MCNC: 14.1 G/DL (ref 12–16)
MCH RBC QN AUTO: 32 PG (ref 27–33)
MCHC RBC AUTO-ENTMCNC: 34.1 G/DL (ref 33.6–35)
MCV RBC AUTO: 93.9 FL (ref 81.4–97.8)
PLATELET # BLD AUTO: 485 K/UL (ref 164–446)
PMV BLD AUTO: 9.1 FL (ref 9–12.9)
POTASSIUM SERPL-SCNC: 3.5 MMOL/L (ref 3.6–5.5)
PROT SERPL-MCNC: 6.2 G/DL (ref 6–8.2)
RBC # BLD AUTO: 4.41 M/UL (ref 4.2–5.4)
SODIUM SERPL-SCNC: 131 MMOL/L (ref 135–145)
WBC # BLD AUTO: 12.9 K/UL (ref 4.8–10.8)

## 2021-11-22 PROCEDURE — 80053 COMPREHEN METABOLIC PANEL: CPT

## 2021-11-22 PROCEDURE — 36415 COLL VENOUS BLD VENIPUNCTURE: CPT

## 2021-11-22 PROCEDURE — 76705 ECHO EXAM OF ABDOMEN: CPT

## 2021-11-22 PROCEDURE — A9270 NON-COVERED ITEM OR SERVICE: HCPCS | Performed by: INTERNAL MEDICINE

## 2021-11-22 PROCEDURE — 700105 HCHG RX REV CODE 258: Performed by: INTERNAL MEDICINE

## 2021-11-22 PROCEDURE — 94760 N-INVAS EAR/PLS OXIMETRY 1: CPT

## 2021-11-22 PROCEDURE — 770006 HCHG ROOM/CARE - MED/SURG/GYN SEMI*

## 2021-11-22 PROCEDURE — 99233 SBSQ HOSP IP/OBS HIGH 50: CPT | Performed by: HOSPITALIST

## 2021-11-22 PROCEDURE — 700102 HCHG RX REV CODE 250 W/ 637 OVERRIDE(OP): Performed by: INTERNAL MEDICINE

## 2021-11-22 PROCEDURE — 74176 CT ABD & PELVIS W/O CONTRAST: CPT | Mod: MG

## 2021-11-22 PROCEDURE — 700101 HCHG RX REV CODE 250: Performed by: INTERNAL MEDICINE

## 2021-11-22 PROCEDURE — 85027 COMPLETE CBC AUTOMATED: CPT

## 2021-11-22 RX ORDER — LIDOCAINE HYDROCHLORIDE 20 MG/ML
15 SOLUTION OROPHARYNGEAL
Status: COMPLETED | OUTPATIENT
Start: 2021-11-22 | End: 2021-11-22

## 2021-11-22 RX ADMIN — ACETAMINOPHEN 1000 MG: 500 TABLET, FILM COATED ORAL at 05:15

## 2021-11-22 RX ADMIN — METOPROLOL SUCCINATE 25 MG: 25 TABLET, EXTENDED RELEASE ORAL at 05:16

## 2021-11-22 RX ADMIN — METAXALONE 800 MG: 800 TABLET ORAL at 05:17

## 2021-11-22 RX ADMIN — SODIUM CHLORIDE: 9 INJECTION, SOLUTION INTRAVENOUS at 01:29

## 2021-11-22 RX ADMIN — SENNOSIDES AND DOCUSATE SODIUM 2 TABLET: 50; 8.6 TABLET ORAL at 05:15

## 2021-11-22 RX ADMIN — OXYCODONE 5 MG: 5 TABLET ORAL at 07:55

## 2021-11-22 RX ADMIN — LIDOCAINE HYDROCHLORIDE 15 ML: 20 SOLUTION ORAL; TOPICAL at 22:36

## 2021-11-22 RX ADMIN — SODIUM CHLORIDE: 9 INJECTION, SOLUTION INTRAVENOUS at 15:45

## 2021-11-22 RX ADMIN — METFORMIN HYDROCHLORIDE 500 MG: 500 TABLET, EXTENDED RELEASE ORAL at 07:55

## 2021-11-22 ASSESSMENT — ENCOUNTER SYMPTOMS
DOUBLE VISION: 0
NERVOUS/ANXIOUS: 0
VOMITING: 0
DIAPHORESIS: 0
HEMOPTYSIS: 0
HEARTBURN: 0
BACK PAIN: 1
RESPIRATORY NEGATIVE: 1
CONSTIPATION: 1
MEMORY LOSS: 1
ABDOMINAL PAIN: 1
MYALGIAS: 1
PALPITATIONS: 0
SEIZURES: 0
BRUISES/BLEEDS EASILY: 0
CARDIOVASCULAR NEGATIVE: 1
DIZZINESS: 0
NEUROLOGICAL NEGATIVE: 1
DEPRESSION: 0
CHILLS: 0
HEADACHES: 0
BLOOD IN STOOL: 0
DIARRHEA: 0
FOCAL WEAKNESS: 0
COUGH: 0
NAUSEA: 1
EYES NEGATIVE: 1
LOSS OF CONSCIOUSNESS: 0
FEVER: 0
WHEEZING: 0

## 2021-11-22 ASSESSMENT — LIFESTYLE VARIABLES: SUBSTANCE_ABUSE: 0

## 2021-11-22 ASSESSMENT — VISUAL ACUITY: OU: 1

## 2021-11-22 ASSESSMENT — PAIN DESCRIPTION - PAIN TYPE
TYPE: ACUTE PAIN

## 2021-11-22 NOTE — PROGRESS NOTES
[facility] Medicine Daily Progress Note    Date of Service  11/22/2021    Chief Complaint  Stacy Kelley is a 76 y.o. female admitted 11/21/2021 with low back pain secondary to ground-level fall    Hospital Course  Stacy Kelley is a 76 y.o. female who lives in [place] with her  who himself has multiple medical problems.  Patient apparently fell on Tuesday of last week and did not tell the family.  She also did not tell me how much pain she was in.  The pain persisted and finally the daughter recognized the situation and brought her to [facility] for evaluation.  Here then she took her to urgent care where she was evaluated imaging studies and recommendation was to get her an MRI.  They were able to complete the MRI yesterday and this shows an L1 compression fracture thus she was brought to the hospital for continued management and evaluation.  Subsequently was found that the patient was also hyponatremic.  This was secondary to dehydration.  The patient this morning has a bowel obstruction and at this point needs NG suctioning.  This was evidenced by CAT scan of the abdomen without contrast.    Interval Problem Update  NG placement  Strict intake and output  Pain management  N.p.o. status  Hydration and monitoring of sodium level  PT OT evaluation and placement options will need to be explored.    I have personally seen and examined the patient at bedside. I discussed the plan of care with patient, family, bedside RN, charge RN,  and pharmacy.    Consultants/Specialty  None    Code Status  Full Code    Disposition  Patient is not medically cleared.   Anticipate discharge to to skilled nursing facility.  I have placed the appropriate orders for post-discharge needs.    Review of Systems  Review of Systems   Constitutional: Positive for malaise/fatigue. Negative for chills, diaphoresis and fever.   HENT: Negative.    Eyes: Negative.  Negative for double vision.   Respiratory: Negative.   Negative for cough, hemoptysis and wheezing.    Cardiovascular: Negative.  Negative for chest pain, palpitations and leg swelling.   Gastrointestinal: Positive for abdominal pain, constipation and nausea. Negative for blood in stool, diarrhea, heartburn and vomiting.   Genitourinary: Negative.  Negative for frequency, hematuria and urgency.   Musculoskeletal: Positive for back pain and myalgias. Negative for joint pain.   Skin: Negative.  Negative for itching and rash.   Neurological: Negative.  Negative for dizziness, focal weakness, seizures, loss of consciousness and headaches.   Endo/Heme/Allergies: Negative.  Does not bruise/bleed easily.   Psychiatric/Behavioral: Positive for memory loss. Negative for depression, substance abuse and suicidal ideas. The patient is not nervous/anxious.    All other systems reviewed and are negative.       Physical Exam  Temp:  [36.3 °C (97.3 °F)-37.1 °C (98.7 °F)] 37.1 °C (98.7 °F)  Pulse:  [] 108  Resp:  [16-20] 20  BP: (132-166)/(67-84) 166/84  SpO2:  [90 %-92 %] 90 %    Physical Exam  Vitals and nursing note reviewed. Exam conducted with a chaperone present.   Constitutional:       General: She is awake.      Appearance: Normal appearance. She is well-developed, well-groomed and normal weight.   HENT:      Head: Normocephalic and atraumatic.      Jaw: There is normal jaw occlusion. No trismus.      Salivary Glands: Right salivary gland is not tender. Left salivary gland is not tender.      Right Ear: External ear normal.      Left Ear: External ear normal.      Mouth/Throat:      Mouth: Mucous membranes are dry.      Pharynx: Oropharynx is clear.   Eyes:      General: Lids are normal. Vision grossly intact.      Extraocular Movements: Extraocular movements intact.      Conjunctiva/sclera: Conjunctivae normal.      Right eye: Right conjunctiva is not injected. No exudate.     Left eye: Left conjunctiva is not injected. No exudate.     Pupils: Pupils are equal, round, and  reactive to light.   Neck:      Thyroid: No thyroid mass.      Vascular: No hepatojugular reflux or JVD.      Trachea: No abnormal tracheal secretions or tracheal deviation.   Cardiovascular:      Rate and Rhythm: Normal rate and regular rhythm. Occasional extrasystoles are present.     Pulses: Normal pulses.      Heart sounds: Normal heart sounds. No murmur heard.  No friction rub.   Pulmonary:      Effort: Pulmonary effort is normal.      Breath sounds: Examination of the right-lower field reveals decreased breath sounds. Examination of the left-lower field reveals decreased breath sounds. Decreased breath sounds present. No wheezing or rhonchi.   Chest:   Breasts:      Right: No supraclavicular adenopathy.      Left: No supraclavicular adenopathy.       Abdominal:      General: Abdomen is flat. Bowel sounds are increased. There is distension.      Palpations: Abdomen is soft.      Tenderness: There is generalized abdominal tenderness. There is no right CVA tenderness or left CVA tenderness.      Hernia: No hernia is present.      Comments: Bowel sounds are hyperechoic indicative of a bowel obstruction   Genitourinary:     General: Normal vulva.   Musculoskeletal:      Cervical back: Full passive range of motion without pain, normal range of motion and neck supple. No rigidity. No muscular tenderness.      Lumbar back: Tenderness present. Decreased range of motion.      Right lower leg: No edema.      Left lower leg: No edema.   Lymphadenopathy:      Head:      Right side of head: No submental adenopathy.      Left side of head: No submental adenopathy.      Cervical:      Right cervical: No superficial cervical adenopathy.     Left cervical: No superficial cervical adenopathy.      Upper Body:      Right upper body: No supraclavicular adenopathy.      Left upper body: No supraclavicular adenopathy.   Skin:     General: Skin is warm and dry.      Capillary Refill: Capillary refill takes less than 2 seconds.       Coloration: Skin is not cyanotic or pale.      Findings: No abrasion or bruising.   Neurological:      General: No focal deficit present.      Mental Status: She is alert and oriented to person, place, and time. Mental status is at baseline.      GCS: GCS eye subscore is 4. GCS verbal subscore is 5. GCS motor subscore is 6.      Cranial Nerves: No cranial nerve deficit.      Sensory: No sensory deficit.      Motor: Motor function is intact.      Deep Tendon Reflexes:      Reflex Scores:       Tricep reflexes are 2+ on the right side and 2+ on the left side.       Bicep reflexes are 2+ on the right side and 2+ on the left side.       Brachioradialis reflexes are 2+ on the right side and 2+ on the left side.       Patellar reflexes are 2+ on the right side and 2+ on the left side.       Achilles reflexes are 2+ on the right side and 2+ on the left side.  Psychiatric:         Attention and Perception: Attention and perception normal.         Mood and Affect: Mood normal.         Speech: Speech normal.         Behavior: Behavior normal. Behavior is cooperative.         Thought Content: Thought content normal.         Cognition and Memory: Cognition and memory normal.         Judgment: Judgment normal.         Fluids    Intake/Output Summary (Last 24 hours) at 11/22/2021 0959  Last data filed at 11/22/2021 0800  Gross per 24 hour   Intake 300 ml   Output 500 ml   Net -200 ml       Laboratory  Recent Labs     11/21/21  0933 11/22/21 0222   WBC 15.7* 12.9*   RBC 4.72 4.41   HEMOGLOBIN 15.0 14.1   HEMATOCRIT 44.4 41.4   MCV 94.1 93.9   MCH 31.8 32.0   MCHC 33.8 34.1   RDW 41.2 41.6   PLATELETCT 495* 485*   MPV 9.0 9.1     Recent Labs     11/21/21  0933 11/22/21 0222   SODIUM 125* 131*   POTASSIUM 3.9 3.5*   CHLORIDE 90* 97   CO2 20 19*   GLUCOSE 204* 158*   BUN 43* 27*   CREATININE 0.87 0.54   CALCIUM 9.9 8.8                   Imaging  CT-ABDOMEN-PELVIS W/O   Final Result      1.  Dilated small and large bowel loops with  no findings consistent with mechanical obstruction identified.      2.  Diverticulosis of the distal colon without evidence of diverticulitis.      3.  Right lower quadrant mesenteric fat stranding which could indicate mild inflammatory changes nonspecific. No bowel wall thickening. No free fluid. No free air.      4.  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      5.  Punctate nonobstructing right renal stones. No hydronephrosis.      6.  Cholelithiasis.      7.  Hepatic steatosis.      8.  Bilateral lung base atelectasis/mild bronchiectasis. Trace right basilar pleural effusion.      DX-CHEST-PORTABLE (1 VIEW)   Final Result      No acute cardiopulmonary abnormality identified.      US-RUQ    (Results Pending)        Assessment/Plan  * Acute midline low back pain without sciatica  Assessment & Plan  Low midline back pain is secondary to L1 compression  Continue with pain management      Bowel obstruction (HCC)  Assessment & Plan  Patient with acute bowel obstruction today, abdomen is distended, tender, hyperechoic.  Stat CT of the abdomen performed I have reviewed the images myself there is megacolon with air-fluid levels  Patient will need stat NG tube for decompression  Possible GI evaluation for bowel decompression, does not appear to have copious stool that requires at this point disimpaction.    Closed compression fracture of body of L1 vertebra (HCC)  Assessment & Plan  Outpatient kyphoplasty being arranged  Pain management  Will need skilled management, will obtain PT OT evaluation    Leukocytosis  Assessment & Plan  Secondary to bowel obstruction patient has leukocytosis  Continue monitor white blood cell count      Hyponatremia  Assessment & Plan  Initial sodium level is down secondary to dehydration  With hydration patient's sodium level has come from 125-131.      CN (constipation)  Assessment & Plan  Patient has chronic constipation and per the daughter has not  had a bowel movement in at least 4 days but possibly longer as she just picked her up 4 days ago from [facility].   For now with the bowel obstruction patient will not be given stool softeners or laxatives.      Cancer (HCC)  Assessment & Plan  Patient reports history of right-sided breast cancer status post mastectomy and this is in remission.  Radiation had diagnosis initially 18 years ago first and then did have a recurrence and now she is status post radiation and surgical intervention      Hypertension  Assessment & Plan  Monitor blood pressure and adjust medications to keep systolic blood pressure less than 140 diastolic under 90.  Continue with Norvasc 5 mg daily  Continue with metoprolol XL 25 mg daily           VTE prophylaxis: enoxaparin ppx    I have performed a physical exam and reviewed and updated ROS and Plan today (11/22/2021). In review of yesterday's note (11/21/2021), there are no changes except as documented above.

## 2021-11-22 NOTE — CARE PLAN
The patient is Stable - Low risk of patient condition declining or worsening    Shift Goals  Clinical Goals: Safety; pain management; BM  Patient Goals: rest; comfort    Progress made toward(s) clinical / shift goals: Administer pain medications per MAR for pain management. Education for safety with ambulation with administration of opioids. Utilize non-pharmacologic interventions. Encourage patient for fluid intake, ambulation, and bowel protocol.   Problem: Pain - Standard  Goal: Alleviation of pain or a reduction in pain to the patient’s comfort goal  Outcome: Progressing     Problem: Knowledge Deficit - Standard  Goal: Patient and family/care givers will demonstrate understanding of plan of care, disease process/condition, diagnostic tests and medications  Outcome: Progressing     Problem: Fall Risk  Goal: Patient will remain free from falls  Outcome: Progressing       Patient is not progressing towards the following goals:N/A

## 2021-11-22 NOTE — ASSESSMENT & PLAN NOTE
Stat CT of the abdomen performed I have reviewed the images myself: dilated small and large bowel loops with no findings consistent with mechanical obstruction identified  She did have bowel movements on 11/23 after received suppository   On NG, output improving to 50cc since 11/23 night. Tolerating clear liquid, NG out 11/24, advance diet.   Optimization of electrolytes

## 2021-11-22 NOTE — DISCHARGE PLANNING
Anticipated Discharge Disposition: SNF    Action: Spoke to pt and daughter, Jennifer, at bedside. Pt gave verbal choice for SNF, choice form faxed to [facility].    Barriers to Discharge: SNF acceptance, PASRR completion    Plan: f/u with [facility] for SNF acceptance, complete PASRR     No

## 2021-11-22 NOTE — DISCHARGE PLANNING
Received Choice form at 1005  Agency/Facility Name: [facility], [place], [place]  Referral sent per Choice form @ 1005      CM informed

## 2021-11-23 ENCOUNTER — APPOINTMENT (OUTPATIENT)
Dept: RADIOLOGY | Facility: MEDICAL CENTER | Age: 76
DRG: 552 | End: 2021-11-23
Attending: STUDENT IN AN ORGANIZED HEALTH CARE EDUCATION/TRAINING PROGRAM
Payer: MEDICARE
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
## 2021-11-23 PROBLEM — E87.6 HYPOKALEMIA: Status: ACTIVE | Noted: 2021-11-23

## 2021-11-23 PROBLEM — K56.7 ILEUS (HCC): Status: ACTIVE | Noted: 2021-11-22

## 2021-11-23 PROBLEM — E83.39 HYPOPHOSPHATEMIA: Status: ACTIVE | Noted: 2021-11-23

## 2021-11-23 PROBLEM — E83.42 HYPOMAGNESEMIA: Status: ACTIVE | Noted: 2021-11-23

## 2021-11-23 LAB
ANION GAP SERPL CALC-SCNC: 15 MMOL/L (ref 7–16)
BASOPHILS # BLD AUTO: 0.5 % (ref 0–1.8)
BASOPHILS # BLD: 0.04 K/UL (ref 0–0.12)
BUN SERPL-MCNC: 19 MG/DL (ref 8–22)
CALCIUM SERPL-MCNC: 8.5 MG/DL (ref 8.4–10.2)
CHLORIDE SERPL-SCNC: 99 MMOL/L (ref 96–112)
CO2 SERPL-SCNC: 21 MMOL/L (ref 20–33)
CREAT SERPL-MCNC: 0.41 MG/DL (ref 0.5–1.4)
EOSINOPHIL # BLD AUTO: 0.13 K/UL (ref 0–0.51)
EOSINOPHIL NFR BLD: 1.6 % (ref 0–6.9)
ERYTHROCYTE [DISTWIDTH] IN BLOOD BY AUTOMATED COUNT: 41.1 FL (ref 35.9–50)
GLUCOSE SERPL-MCNC: 167 MG/DL (ref 65–99)
HCT VFR BLD AUTO: 41.6 % (ref 37–47)
HGB BLD-MCNC: 14.2 G/DL (ref 12–16)
IMM GRANULOCYTES # BLD AUTO: 0.05 K/UL (ref 0–0.11)
IMM GRANULOCYTES NFR BLD AUTO: 0.6 % (ref 0–0.9)
LYMPHOCYTES # BLD AUTO: 1.61 K/UL (ref 1–4.8)
LYMPHOCYTES NFR BLD: 19.8 % (ref 22–41)
MAGNESIUM SERPL-MCNC: 1.4 MG/DL (ref 1.5–2.5)
MCH RBC QN AUTO: 31.8 PG (ref 27–33)
MCHC RBC AUTO-ENTMCNC: 34.1 G/DL (ref 33.6–35)
MCV RBC AUTO: 93.3 FL (ref 81.4–97.8)
MONOCYTES # BLD AUTO: 0.93 K/UL (ref 0–0.85)
MONOCYTES NFR BLD AUTO: 11.4 % (ref 0–13.4)
NEUTROPHILS # BLD AUTO: 5.38 K/UL (ref 2–7.15)
NEUTROPHILS NFR BLD: 66.1 % (ref 44–72)
NRBC # BLD AUTO: 0 K/UL
NRBC BLD-RTO: 0 /100 WBC
PHOSPHATE SERPL-MCNC: 1.7 MG/DL (ref 2.5–4.5)
PLATELET # BLD AUTO: 504 K/UL (ref 164–446)
PMV BLD AUTO: 9.2 FL (ref 9–12.9)
POTASSIUM SERPL-SCNC: 3.3 MMOL/L (ref 3.6–5.5)
RBC # BLD AUTO: 4.46 M/UL (ref 4.2–5.4)
SODIUM SERPL-SCNC: 135 MMOL/L (ref 135–145)
WBC # BLD AUTO: 8.1 K/UL (ref 4.8–10.8)

## 2021-11-23 PROCEDURE — 74018 RADEX ABDOMEN 1 VIEW: CPT

## 2021-11-23 PROCEDURE — 700102 HCHG RX REV CODE 250 W/ 637 OVERRIDE(OP): Performed by: INTERNAL MEDICINE

## 2021-11-23 PROCEDURE — 700105 HCHG RX REV CODE 258: Performed by: STUDENT IN AN ORGANIZED HEALTH CARE EDUCATION/TRAINING PROGRAM

## 2021-11-23 PROCEDURE — 700111 HCHG RX REV CODE 636 W/ 250 OVERRIDE (IP): Performed by: STUDENT IN AN ORGANIZED HEALTH CARE EDUCATION/TRAINING PROGRAM

## 2021-11-23 PROCEDURE — 97162 PT EVAL MOD COMPLEX 30 MIN: CPT

## 2021-11-23 PROCEDURE — 700101 HCHG RX REV CODE 250: Performed by: STUDENT IN AN ORGANIZED HEALTH CARE EDUCATION/TRAINING PROGRAM

## 2021-11-23 PROCEDURE — A9270 NON-COVERED ITEM OR SERVICE: HCPCS | Performed by: INTERNAL MEDICINE

## 2021-11-23 PROCEDURE — 700105 HCHG RX REV CODE 258: Performed by: INTERNAL MEDICINE

## 2021-11-23 PROCEDURE — 94760 N-INVAS EAR/PLS OXIMETRY 1: CPT

## 2021-11-23 PROCEDURE — 99233 SBSQ HOSP IP/OBS HIGH 50: CPT | Performed by: STUDENT IN AN ORGANIZED HEALTH CARE EDUCATION/TRAINING PROGRAM

## 2021-11-23 PROCEDURE — A9270 NON-COVERED ITEM OR SERVICE: HCPCS | Performed by: STUDENT IN AN ORGANIZED HEALTH CARE EDUCATION/TRAINING PROGRAM

## 2021-11-23 PROCEDURE — 36415 COLL VENOUS BLD VENIPUNCTURE: CPT

## 2021-11-23 PROCEDURE — 80048 BASIC METABOLIC PNL TOTAL CA: CPT

## 2021-11-23 PROCEDURE — 97166 OT EVAL MOD COMPLEX 45 MIN: CPT

## 2021-11-23 PROCEDURE — 700102 HCHG RX REV CODE 250 W/ 637 OVERRIDE(OP): Performed by: STUDENT IN AN ORGANIZED HEALTH CARE EDUCATION/TRAINING PROGRAM

## 2021-11-23 PROCEDURE — 83735 ASSAY OF MAGNESIUM: CPT

## 2021-11-23 PROCEDURE — 700111 HCHG RX REV CODE 636 W/ 250 OVERRIDE (IP): Performed by: INTERNAL MEDICINE

## 2021-11-23 PROCEDURE — 770006 HCHG ROOM/CARE - MED/SURG/GYN SEMI*

## 2021-11-23 PROCEDURE — 84100 ASSAY OF PHOSPHORUS: CPT

## 2021-11-23 PROCEDURE — 85025 COMPLETE CBC W/AUTO DIFF WBC: CPT

## 2021-11-23 RX ORDER — LIDOCAINE 50 MG/G
1 PATCH TOPICAL EVERY 24 HOURS
Status: DISCONTINUED | OUTPATIENT
Start: 2021-11-23 | End: 2021-11-30 | Stop reason: HOSPADM

## 2021-11-23 RX ORDER — MAGNESIUM SULFATE HEPTAHYDRATE 40 MG/ML
2 INJECTION, SOLUTION INTRAVENOUS ONCE
Status: COMPLETED | OUTPATIENT
Start: 2021-11-23 | End: 2021-11-23

## 2021-11-23 RX ORDER — ACETAMINOPHEN 325 MG/1
650 TABLET ORAL EVERY 4 HOURS PRN
Status: DISCONTINUED | OUTPATIENT
Start: 2021-11-23 | End: 2021-11-30 | Stop reason: HOSPADM

## 2021-11-23 RX ADMIN — THIAMINE HYDROCHLORIDE 100 MG: 100 INJECTION, SOLUTION INTRAMUSCULAR; INTRAVENOUS at 17:56

## 2021-11-23 RX ADMIN — SODIUM CHLORIDE: 9 INJECTION, SOLUTION INTRAVENOUS at 18:01

## 2021-11-23 RX ADMIN — MAGNESIUM SULFATE 2 G: 2 INJECTION INTRAVENOUS at 14:11

## 2021-11-23 RX ADMIN — LIDOCAINE 1 PATCH: 50 PATCH TOPICAL at 14:03

## 2021-11-23 RX ADMIN — BISACODYL 10 MG: 10 SUPPOSITORY RECTAL at 08:36

## 2021-11-23 RX ADMIN — POTASSIUM PHOSPHATE, MONOBASIC POTASSIUM PHOSPHATE, DIBASIC 30 MMOL: 224; 236 INJECTION, SOLUTION, CONCENTRATE INTRAVENOUS at 18:00

## 2021-11-23 RX ADMIN — SODIUM CHLORIDE: 9 INJECTION, SOLUTION INTRAVENOUS at 04:56

## 2021-11-23 RX ADMIN — ENOXAPARIN SODIUM 40 MG: 40 INJECTION SUBCUTANEOUS at 08:35

## 2021-11-23 RX ADMIN — ACETAMINOPHEN 650 MG: 325 TABLET, FILM COATED ORAL at 14:02

## 2021-11-23 ASSESSMENT — ENCOUNTER SYMPTOMS
RESPIRATORY NEGATIVE: 1
DOUBLE VISION: 0
DIARRHEA: 0
NAUSEA: 1
VOMITING: 0
MYALGIAS: 1
HEARTBURN: 0
BLOOD IN STOOL: 0
BACK PAIN: 1
DIAPHORESIS: 0
LOSS OF CONSCIOUSNESS: 0
NERVOUS/ANXIOUS: 0
MEMORY LOSS: 1
FEVER: 0
FOCAL WEAKNESS: 0
CONSTIPATION: 1
DEPRESSION: 0
WHEEZING: 0
PALPITATIONS: 0
BRUISES/BLEEDS EASILY: 0
CARDIOVASCULAR NEGATIVE: 1
DIZZINESS: 0
SEIZURES: 0
ABDOMINAL PAIN: 1
COUGH: 0
HEADACHES: 0
HEMOPTYSIS: 0
NEUROLOGICAL NEGATIVE: 1
EYES NEGATIVE: 1
CHILLS: 0

## 2021-11-23 ASSESSMENT — GAIT ASSESSMENTS
DISTANCE (FEET): 10
DEVIATION: SHUFFLED GAIT;BRADYKINETIC
DISTANCE (FEET): 1
ASSISTIVE DEVICE: FRONT WHEEL WALKER
GAIT LEVEL OF ASSIST: MODERATE ASSIST

## 2021-11-23 ASSESSMENT — COGNITIVE AND FUNCTIONAL STATUS - GENERAL
WALKING IN HOSPITAL ROOM: A LOT
STANDING UP FROM CHAIR USING ARMS: A LOT
SUGGESTED CMS G CODE MODIFIER DAILY ACTIVITY: CL
MOVING FROM LYING ON BACK TO SITTING ON SIDE OF FLAT BED: A LOT
DRESSING REGULAR LOWER BODY CLOTHING: A LOT
MOBILITY SCORE: 9
TURNING FROM BACK TO SIDE WHILE IN FLAT BAD: UNABLE
PERSONAL GROOMING: A LOT
DAILY ACTIVITIY SCORE: 13
SUGGESTED CMS G CODE MODIFIER MOBILITY: CM
EATING MEALS: A LITTLE
MOVING TO AND FROM BED TO CHAIR: UNABLE
HELP NEEDED FOR BATHING: A LOT
CLIMB 3 TO 5 STEPS WITH RAILING: TOTAL
DRESSING REGULAR UPPER BODY CLOTHING: A LOT
TOILETING: A LOT

## 2021-11-23 ASSESSMENT — LIFESTYLE VARIABLES: SUBSTANCE_ABUSE: 0

## 2021-11-23 ASSESSMENT — ACTIVITIES OF DAILY LIVING (ADL): TOILETING: INDEPENDENT

## 2021-11-23 ASSESSMENT — VISUAL ACUITY: OU: 1

## 2021-11-23 ASSESSMENT — PAIN DESCRIPTION - PAIN TYPE
TYPE: ACUTE PAIN
TYPE: ACUTE PAIN

## 2021-11-23 NOTE — PROGRESS NOTES
Received report from Day shift RN. Pt is laying in bed, A+OX3 , showing no signs of distress. Pt denies the need for pain medication . VSS. CMS intact. POC discussed. Fall precautions in place.  Call light and belongings at bedside and within reach. All questions answered at this time. Rounding in place

## 2021-11-23 NOTE — CARE PLAN
The patient is Watcher - Medium risk of patient condition declining or worsening    Shift Goals  Clinical Goals: Pain control and decompression with NG tube   Patient Goals: Pain control and rest     Progress made toward(s) clinical / shift goals:  Pt educated and re-educated on POC. Pt has new NG tube in place and pt is agreeable to leaving NG tube in place.     Patient is not progressing towards the following goals: n/a      Problem: Pain - Standard  Goal: Alleviation of pain or a reduction in pain to the patient’s comfort goal  Outcome: Progressing     Problem: Knowledge Deficit - Standard  Goal: Patient and family/care givers will demonstrate understanding of plan of care, disease process/condition, diagnostic tests and medications  Outcome: Progressing     Problem: Fall Risk  Goal: Patient will remain free from falls  Outcome: Progressing

## 2021-11-23 NOTE — PROGRESS NOTES
"2000: Pt sleeping in bed, bed alarm on, call light in reach, bed locked in lowest position.     2130: CNA walked into pts room and found NG tube on the ground. RN at bedside, pt states \" I took it out because it was bothering me\". Pt educated on importance. Pt states she is agreeable for re-insertion.   "

## 2021-11-23 NOTE — PROGRESS NOTES
2258: NG tube placement with 2 RN's at bedside. Pt tolerated well. See LDA. Education provided, awaiting x-ray verification.

## 2021-11-23 NOTE — THERAPY
"Missed Therapy     Patient Name: Stacy Kelley  Age:  76 y.o., Sex:  female  Medical Record #: [id number]  Today's Date: 11/22/2021    Discussed missed therapy with RN, PT       11/22/21 1701   Interdisciplinary Plan of Care Collaboration   Collaboration Comments Attempted OT eval x2, initially daughter refused on her behalf stating they just learned pt needed and NG tube placed \"now.\"  Returned later and pt refused as she is not feeling well today.  Will follow and eval as able.     "

## 2021-11-23 NOTE — PROGRESS NOTES
[facility] Medicine Daily Progress Note    Date of Service  11/23/2021    Chief Complaint  Stacy Kelley is a 76 y.o. female admitted 11/21/2021 with low back pain secondary to ground-level fall    Hospital Course  Stacy Kelley is a 76 y.o. female who lives in [place] with her  who himself has multiple medical problems.  Patient apparently fell on Tuesday of last week and did not tell the family.  The pain persisted and finally the daughter recognized the situation and brought her to [place] for evaluation.  Here then she took her to urgent care where she was evaluated imaging studies and recommendation was to get her an MRI.  They were able to complete the MRI 11/21 and this shows an acute L1 compression fracture thus she was brought to the hospital for continued management and evaluation.  Subsequently was found that the patient was also hyponatremic on admision.      The patient was noted to have nausea, vomiting. CT abd noted Dilated small and large bowel loops with no findings consistent with mechanical obstruction identified. NG tube was placed. Reports no bowel movements for at least 4 days. Suppository given.     Interval Problem Update  NG tube, with 400cc dark output.   Suppository given with bowel movements this am   Strict NPO  IVF  Noted K, phos, mag low, replacement.   Give thiamine  Need outpatient follow up with [facility] for kyphoplasty    I have personally seen and examined the patient at bedside. I discussed the plan of care with patient, family, bedside RN, charge RN,  and pharmacy.    Consultants/Specialty  None    Code Status  Full Code    Disposition  Patient is not medically cleared.   Anticipate discharge to to skilled nursing facility.  I have placed the appropriate orders for post-discharge needs.    Review of Systems  Review of Systems   Constitutional: Positive for malaise/fatigue. Negative for chills, diaphoresis and fever.   HENT: Negative.    Eyes:  Negative.  Negative for double vision.   Respiratory: Negative.  Negative for cough, hemoptysis and wheezing.    Cardiovascular: Negative.  Negative for chest pain, palpitations and leg swelling.   Gastrointestinal: Positive for abdominal pain, constipation and nausea. Negative for blood in stool, diarrhea, heartburn and vomiting.   Genitourinary: Negative.  Negative for frequency, hematuria and urgency.   Musculoskeletal: Positive for back pain and myalgias. Negative for joint pain.   Skin: Negative.  Negative for itching and rash.   Neurological: Negative.  Negative for dizziness, focal weakness, seizures, loss of consciousness and headaches.   Endo/Heme/Allergies: Negative.  Does not bruise/bleed easily.   Psychiatric/Behavioral: Positive for memory loss. Negative for depression, substance abuse and suicidal ideas. The patient is not nervous/anxious.    All other systems reviewed and are negative.       Physical Exam  Temp:  [36.7 °C (98.1 °F)-36.8 °C (98.3 °F)] 36.8 °C (98.3 °F)  Pulse:  [] 98  Resp:  [16-18] 17  BP: (133-155)/(71-83) 155/83  SpO2:  [90 %-92 %] 92 %    Physical Exam  Vitals and nursing note reviewed. Exam conducted with a chaperone present.   Constitutional:       General: She is awake.      Appearance: Normal appearance. She is well-developed, well-groomed and normal weight.   HENT:      Head: Normocephalic and atraumatic.      Jaw: There is normal jaw occlusion. No trismus.      Salivary Glands: Right salivary gland is not tender. Left salivary gland is not tender.      Right Ear: External ear normal.      Left Ear: External ear normal.      Mouth/Throat:      Mouth: Mucous membranes are dry.      Pharynx: Oropharynx is clear.   Eyes:      General: Lids are normal. Vision grossly intact.      Extraocular Movements: Extraocular movements intact.      Conjunctiva/sclera: Conjunctivae normal.      Right eye: Right conjunctiva is not injected. No exudate.     Left eye: Left conjunctiva is  not injected. No exudate.     Pupils: Pupils are equal, round, and reactive to light.   Neck:      Thyroid: No thyroid mass.      Vascular: No hepatojugular reflux or JVD.      Trachea: No abnormal tracheal secretions or tracheal deviation.   Cardiovascular:      Rate and Rhythm: Normal rate and regular rhythm. Occasional extrasystoles are present.     Pulses: Normal pulses.      Heart sounds: Normal heart sounds. No murmur heard.  No friction rub.   Pulmonary:      Effort: Pulmonary effort is normal.      Breath sounds: Examination of the right-lower field reveals decreased breath sounds. Examination of the left-lower field reveals decreased breath sounds. Decreased breath sounds present. No wheezing or rhonchi.   Abdominal:      General: Abdomen is flat. There is distension.      Palpations: Abdomen is soft.      Tenderness: There is generalized abdominal tenderness (improving). There is no right CVA tenderness or left CVA tenderness.      Hernia: No hernia is present.      Comments: Bowel sounds appreciated   Genitourinary:     General: Normal vulva.   Musculoskeletal:      Cervical back: Full passive range of motion without pain, normal range of motion and neck supple. No rigidity. No muscular tenderness.      Lumbar back: Tenderness present. Decreased range of motion.      Right lower leg: No edema.      Left lower leg: No edema.   Skin:     General: Skin is warm and dry.      Capillary Refill: Capillary refill takes less than 2 seconds.      Coloration: Skin is not cyanotic or pale.      Findings: No abrasion or bruising.   Neurological:      General: No focal deficit present.      Mental Status: She is alert and oriented to person, place, and time. Mental status is at baseline.      Cranial Nerves: No cranial nerve deficit.      Sensory: No sensory deficit.      Motor: Motor function is intact.   Psychiatric:         Attention and Perception: Attention and perception normal.         Mood and Affect: Mood  normal.         Speech: Speech normal.         Behavior: Behavior normal. Behavior is cooperative.         Thought Content: Thought content normal.         Cognition and Memory: Cognition and memory normal.         Judgment: Judgment normal.         Fluids    Intake/Output Summary (Last 24 hours) at 11/23/2021 1122  Last data filed at 11/23/2021 0600  Gross per 24 hour   Intake 3243.92 ml   Output 980 ml   Net 2263.92 ml       Laboratory  Recent Labs     11/21/21  0933 11/22/21 0222 11/23/21  0944   WBC 15.7* 12.9* 8.1   RBC 4.72 4.41 4.46   HEMOGLOBIN 15.0 14.1 14.2   HEMATOCRIT 44.4 41.4 41.6   MCV 94.1 93.9 93.3   MCH 31.8 32.0 31.8   MCHC 33.8 34.1 34.1   RDW 41.2 41.6 41.1   PLATELETCT 495* 485* 504*   MPV 9.0 9.1 9.2     Recent Labs     11/21/21 0933 11/22/21 0222 11/23/21 0944   SODIUM 125* 131* 135   POTASSIUM 3.9 3.5* 3.3*   CHLORIDE 90* 97 99   CO2 20 19* 21   GLUCOSE 204* 158* 167*   BUN 43* 27* 19   CREATININE 0.87 0.54 0.41*   CALCIUM 9.9 8.8 8.5                   Imaging  DX-ABDOMEN FOR TUBE PLACEMENT   Final Result      Enteric tube tip projects over the stomach.      US-RUQ   Final Result      1.  Sludge and gallstones within the gallbladder. No evidence of biliary ductal dilatation.      2.  The liver is echogenic consistent with fatty change versus hepatocellular dysfunction.      DX-ABDOMEN FOR TUBE PLACEMENT   Final Result      Interval advancement of a nasogastric tube with the tip now overlying the expected location of the gastric body.      DX-ABDOMEN FOR TUBE PLACEMENT   Final Result      NG tube tip located at or just below the gastroesophageal junction. Advancement is recommended.      The report was called to the patient's nurse at 12:13 PM on 11/22/2021      CT-ABDOMEN-PELVIS W/O   Final Result      1.  Dilated small and large bowel loops with no findings consistent with mechanical obstruction identified.      2.  Diverticulosis of the distal colon without evidence of diverticulitis.       3.  Right lower quadrant mesenteric fat stranding which could indicate mild inflammatory changes nonspecific. No bowel wall thickening. No free fluid. No free air.      4.  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      5.  Punctate nonobstructing right renal stones. No hydronephrosis.      6.  Cholelithiasis.      7.  Hepatic steatosis.      8.  Bilateral lung base atelectasis/mild bronchiectasis. Trace right basilar pleural effusion.      DX-CHEST-PORTABLE (1 VIEW)   Final Result      No acute cardiopulmonary abnormality identified.      DS-BONE DENSITY STUDY (DEXA)    (Results Pending)        Assessment/Plan  * Acute midline low back pain without sciatica  Assessment & Plan  Low midline back pain is secondary to L1 compression  Continue with multimodal pain managements, lidocaine patch    Fall  Assessment & Plan  PT/OT  Fall precautions    Hypomagnesemia  Assessment & Plan  Replace as indicated    Hypophosphatemia  Assessment & Plan  Replace as indicated    Hypokalemia  Assessment & Plan  Replace as indicated    Ileus (HCC)  Assessment & Plan  Stat CT of the abdomen performed I have reviewed the images myself: dilated small and large bowel loops with no findings consistent with mechanical obstruction identified  On NG  She did have bowel movements on 11/23 after received suppository   Strict NPO, IVF  Monitor NG output  Optimization of electrolytes    Leukocytosis  Assessment & Plan  Secondary to bowel obstruction patient has leukocytosis  Continue monitor white blood cell count, improving      Cancer (HCC)  Assessment & Plan  Patient reports history of right-sided breast cancer status post mastectomy and this is in remission.  Radiation had diagnosis initially 18 years ago first and then did have a recurrence and now she is status post radiation and surgical intervention      Hypertension  Assessment & Plan  Monitor blood pressure and adjust medications to keep  systolic blood pressure less than 140 diastolic under 90.  Continue with Norvasc 5 mg daily  Continue with metoprolol XL 25 mg daily      Closed compression fracture of body of L1 vertebra (HCC)  Assessment & Plan  Outpatient kyphoplasty being arranged  Pain management  Will need skilled management, will obtain PT OT evaluation    Hyponatremia  Assessment & Plan  Initial sodium level is down secondary to dehydration  Improving with IVF      CN (constipation)  Assessment & Plan  Patient has chronic constipation and per the daughter has not had a bowel movement in at least 4 days but possibly longer as she just picked her up 4 days ago from [place].   Given suppository 11/23 and had bowel movements         VTE prophylaxis: enoxaparin ppx    I have performed a physical exam and reviewed and updated ROS and Plan today (11/23/2021). In review of yesterday's note (11/22/2021), there are no changes except as documented above.

## 2021-11-23 NOTE — THERAPY
Occupational Therapy   Initial Evaluation     Patient Name: Stacy Kelley  Age:  76 y.o., Sex:  female  Medical Record #: [id number]  Today's Date: 11/23/2021     Precautions  Precautions: (P) Fall Risk,Spinal / Back Precautions ,TLSO (Thoracolumbosacral orthosis),Nasogastric Tube (NPO d/t suspected SBO)    Assessment  Patient is 76 y.o. female with a diagnosis of fall at home with L1 fracture.  Seen at urgent care, plan in works for kyphoplasty as outpt.  However pt's pain got progressively worse to the point that she could not longer walk or care for herself, and was BIB family.  She was also found to have suspected SBO and currently has NG tube is NPO and efforts underway to try and clear intestines .  Pt currently requiring 2 people for bed mobility, self care and transfers, and is limited to very short distance of walking with FWW (unsteady) with FWW.  Pain and lethargy are significant limiting factors for her at this time.  If she cannot get relief from the back pain (hopefully kyphoplasty SOON) she does NOT appear she would be safe to return home even with assist from family.  If medically appropriate, if the kyphoplasty could be ESCALATED TO URGENT this pt appears she would have a more favorable return to functional mobility and ADl's.  If kyphoplasty is delayed, she would likely need inpt post acute therapy prior to home to work on mobility and ADL's with TLSO and spinal precautions in place.  OT will follow while in house.      Plan    Recommend Occupational Therapy 3 times per week until therapy goals are met for the following treatments:  Adaptive Equipment, Neuro Re-Education / Balance, Self Care/Activities of Daily Living, Therapeutic Activities and Therapeutic Exercises.    DC Equipment Recommendations: (P) Unable to determine at this time  Discharge Recommendations: (P) Recommend post-acute placement for additional occupational therapy services prior to discharge home     Subjective    Son states  "\"there is no way we can take care of her at home when she is like this. She's in way too much pain.\"     Objective       11/23/21 0687   Prior Living Situation   Prior Services None   Housing / Facility 1 Story House   Steps Into Home 3   Equipment Owned Front-Wheel Walker;Single Point Cane   Lives with - Patient's Self Care Capacity Spouse   Comments Pt distacted by pain, unable to provide full home and equipment setup details.  Son at bedside, anxious with questioning from therapist about home setup and help avail.  Pt lives with spouse in [place] who is essentially w/c bound due to severe arthritis in knees for which he is due to have surgery in a couple weeks.  Pt reports her PLOF was using cane or walker in the home and indep with ADl's (althoug son reports \"It was scary to watch her walk sometimes\")  Son and dtr both live here in [place], but neither have the ability to be full time caregivers for pt or her spouse (their stepfather) as they all work full time.  Pt needs to be mobilizing safely and able to complete simple ADL's on her own before being discharged home.   Prior Level of ADL Function   Self Feeding Independent   Grooming / Hygiene Independent   Bathing Independent   Dressing Independent   Toileting Independent   Prior Level of IADL Function   Medication Management Independent   Laundry Independent   Kitchen Mobility Independent   Finances Independent   Home Management Independent   Shopping Independent   Prior Level Of Mobility Independent With Device in Home   Driving / Transportation Driving Independent   Occupation (Pre-Hospital Vocational) Retired Due To Age   Leisure Interests Unable To Determine At This Time   History of Falls   History of Falls Yes   Date of Last Fall   (pt fell within the last week sustaining L1 fracture)   Precautions   Precautions Fall Risk;Spinal / Back Precautions ;TLSO (Thoracolumbosacral orthosis);Nasogastric Tube  (NPO d/t suspected SBO)   Vitals   O2 Delivery Device " "None - Room Air   Pain 0 - 10 Group   Location Back   Location Orientation Lower   Description Aching;Sharp   Therapist Pain Assessment 7;During Activity;Nurse Notified   Cognition    Cognition / Consciousness X   Level of Consciousness Alert   Safety Awareness Impaired   Comments Pt distracted by pain, decreased insight into current limitations.  Perseverating on \"I just want to go home\" but not recognizing she is unable to safely care for herself at this time   Active ROM Upper Body   Active ROM Upper Body  WDL   Dominant Hand Right   Strength Upper Body   Upper Body Strength  WDL   Balance Assessment   Sitting Balance (Static) Fair +   Sitting Balance (Dynamic) Fair +   Standing Balance (Static) Poor   Standing Balance (Dynamic) Poor -   Weight Shift Sitting Poor   Weight Shift Standing Poor   Comments with FWW   Bed Mobility    Supine to Sit Moderate Assist  (x2 people)   Sit to Supine Moderate Assist  (x2 people)   Scooting Moderate Assist   Rolling Moderate Assist to Rt.;Moderate Assist to Lt.   ADL Assessment   Upper Body Dressing Maximal Assist  (don TLSO EOB)   Lower Body Dressing Maximal Assist   Toileting   (up to commode with 2 person assist per RN)   Functional Mobility   Sit to Stand Moderate Assist   Bed, Chair, Wheelchair Transfer Moderate Assist   Transfer Method Stand Step   Mobility 10   Distance (Feet) 1   Comments unsteady with FWW, impulsive, cues to stay close to FWW, moves quickly with twisting motion upon returning to sitting which increased pain   Visual Perception   Visual Perception  WDL   Activity Tolerance   Sitting Edge of Bed 2-3 min   Standing 2 min   Comments limited by pain and weakness   Patient / Family Goals   Patient / Family Goal #1 home when safe and mobile   Short Term Goals   Short Term Goal # 1 Pt will get up to EOB for ADL's with Becky in 3 visits   Short Term Goal # 2 Pt will don TLSO with Becky in 4 visits   Short Term Goal # 3 Pt will walk to BR with FWW and Becky in 4 " visits   Short Term Goal # 4 Pt will toilet Becky in 4 visits   Short Term Goal # 5 Pt will stand 8 min at sink to groom supervised in 4 visits

## 2021-11-23 NOTE — THERAPY
Missed Therapy     Patient Name: Stacy Kelley  Age:  76 y.o., Sex:  female  Medical Record #: [id number]  Today's Date: 11/22/2021 11/22/21 1530   Initial Contact Note    Initial Contact Note Order Received and Verified, Physical Therapy Evaluation in Progress with Full Report to Follow.   Interdisciplinary Plan of Care Collaboration   IDT Collaboration with  Nursing   Collaboration Comments Attempted PT eval x2, pt refused due to NG tube placement and not feeling up for it today. Will f/u tomorrow.

## 2021-11-24 LAB
ANION GAP SERPL CALC-SCNC: 14 MMOL/L (ref 7–16)
BASOPHILS # BLD AUTO: 0.6 % (ref 0–1.8)
BASOPHILS # BLD: 0.06 K/UL (ref 0–0.12)
BUN SERPL-MCNC: 17 MG/DL (ref 8–22)
CALCIUM SERPL-MCNC: 8.3 MG/DL (ref 8.4–10.2)
CHLORIDE SERPL-SCNC: 97 MMOL/L (ref 96–112)
CO2 SERPL-SCNC: 21 MMOL/L (ref 20–33)
CREAT SERPL-MCNC: 0.38 MG/DL (ref 0.5–1.4)
EOSINOPHIL # BLD AUTO: 0.28 K/UL (ref 0–0.51)
EOSINOPHIL NFR BLD: 2.9 % (ref 0–6.9)
ERYTHROCYTE [DISTWIDTH] IN BLOOD BY AUTOMATED COUNT: 40.9 FL (ref 35.9–50)
GLUCOSE SERPL-MCNC: 149 MG/DL (ref 65–99)
HCT VFR BLD AUTO: 40.6 % (ref 37–47)
HGB BLD-MCNC: 13.9 G/DL (ref 12–16)
IMM GRANULOCYTES # BLD AUTO: 0.06 K/UL (ref 0–0.11)
IMM GRANULOCYTES NFR BLD AUTO: 0.6 % (ref 0–0.9)
LYMPHOCYTES # BLD AUTO: 1.79 K/UL (ref 1–4.8)
LYMPHOCYTES NFR BLD: 18.5 % (ref 22–41)
MAGNESIUM SERPL-MCNC: 1.6 MG/DL (ref 1.5–2.5)
MCH RBC QN AUTO: 32 PG (ref 27–33)
MCHC RBC AUTO-ENTMCNC: 34.2 G/DL (ref 33.6–35)
MCV RBC AUTO: 93.3 FL (ref 81.4–97.8)
MONOCYTES # BLD AUTO: 0.94 K/UL (ref 0–0.85)
MONOCYTES NFR BLD AUTO: 9.7 % (ref 0–13.4)
NEUTROPHILS # BLD AUTO: 6.57 K/UL (ref 2–7.15)
NEUTROPHILS NFR BLD: 67.7 % (ref 44–72)
NRBC # BLD AUTO: 0 K/UL
NRBC BLD-RTO: 0 /100 WBC
PHOSPHATE SERPL-MCNC: 2.1 MG/DL (ref 2.5–4.5)
PLATELET # BLD AUTO: 458 K/UL (ref 164–446)
PMV BLD AUTO: 8.7 FL (ref 9–12.9)
POTASSIUM SERPL-SCNC: 3.1 MMOL/L (ref 3.6–5.5)
RBC # BLD AUTO: 4.35 M/UL (ref 4.2–5.4)
SODIUM SERPL-SCNC: 132 MMOL/L (ref 135–145)
WBC # BLD AUTO: 9.7 K/UL (ref 4.8–10.8)

## 2021-11-24 PROCEDURE — 97116 GAIT TRAINING THERAPY: CPT

## 2021-11-24 PROCEDURE — 36415 COLL VENOUS BLD VENIPUNCTURE: CPT

## 2021-11-24 PROCEDURE — 84100 ASSAY OF PHOSPHORUS: CPT

## 2021-11-24 PROCEDURE — A9270 NON-COVERED ITEM OR SERVICE: HCPCS | Performed by: INTERNAL MEDICINE

## 2021-11-24 PROCEDURE — 700111 HCHG RX REV CODE 636 W/ 250 OVERRIDE (IP): Performed by: HOSPITALIST

## 2021-11-24 PROCEDURE — 700105 HCHG RX REV CODE 258: Performed by: STUDENT IN AN ORGANIZED HEALTH CARE EDUCATION/TRAINING PROGRAM

## 2021-11-24 PROCEDURE — 97530 THERAPEUTIC ACTIVITIES: CPT

## 2021-11-24 PROCEDURE — 99233 SBSQ HOSP IP/OBS HIGH 50: CPT | Performed by: STUDENT IN AN ORGANIZED HEALTH CARE EDUCATION/TRAINING PROGRAM

## 2021-11-24 PROCEDURE — 97535 SELF CARE MNGMENT TRAINING: CPT

## 2021-11-24 PROCEDURE — 85025 COMPLETE CBC W/AUTO DIFF WBC: CPT

## 2021-11-24 PROCEDURE — 700102 HCHG RX REV CODE 250 W/ 637 OVERRIDE(OP): Performed by: STUDENT IN AN ORGANIZED HEALTH CARE EDUCATION/TRAINING PROGRAM

## 2021-11-24 PROCEDURE — 770006 HCHG ROOM/CARE - MED/SURG/GYN SEMI*

## 2021-11-24 PROCEDURE — 700105 HCHG RX REV CODE 258: Performed by: INTERNAL MEDICINE

## 2021-11-24 PROCEDURE — A9270 NON-COVERED ITEM OR SERVICE: HCPCS | Performed by: STUDENT IN AN ORGANIZED HEALTH CARE EDUCATION/TRAINING PROGRAM

## 2021-11-24 PROCEDURE — 700101 HCHG RX REV CODE 250: Performed by: STUDENT IN AN ORGANIZED HEALTH CARE EDUCATION/TRAINING PROGRAM

## 2021-11-24 PROCEDURE — 83735 ASSAY OF MAGNESIUM: CPT

## 2021-11-24 PROCEDURE — 700111 HCHG RX REV CODE 636 W/ 250 OVERRIDE (IP): Performed by: STUDENT IN AN ORGANIZED HEALTH CARE EDUCATION/TRAINING PROGRAM

## 2021-11-24 PROCEDURE — 700111 HCHG RX REV CODE 636 W/ 250 OVERRIDE (IP): Performed by: INTERNAL MEDICINE

## 2021-11-24 PROCEDURE — 700102 HCHG RX REV CODE 250 W/ 637 OVERRIDE(OP): Performed by: INTERNAL MEDICINE

## 2021-11-24 PROCEDURE — 80048 BASIC METABOLIC PNL TOTAL CA: CPT

## 2021-11-24 RX ORDER — GAUZE BANDAGE 2" X 2"
100 BANDAGE TOPICAL DAILY
Status: COMPLETED | OUTPATIENT
Start: 2021-11-25 | End: 2021-11-25

## 2021-11-24 RX ORDER — LIDOCAINE 50 MG/G
1 PATCH TOPICAL DAILY
Status: DISCONTINUED | OUTPATIENT
Start: 2021-11-24 | End: 2021-11-30 | Stop reason: HOSPADM

## 2021-11-24 RX ORDER — KETOROLAC TROMETHAMINE 30 MG/ML
15 INJECTION, SOLUTION INTRAMUSCULAR; INTRAVENOUS
Status: COMPLETED | OUTPATIENT
Start: 2021-11-24 | End: 2021-11-24

## 2021-11-24 RX ORDER — MAGNESIUM SULFATE HEPTAHYDRATE 40 MG/ML
2 INJECTION, SOLUTION INTRAVENOUS ONCE
Status: COMPLETED | OUTPATIENT
Start: 2021-11-24 | End: 2021-11-24

## 2021-11-24 RX ORDER — MAGNESIUM SULFATE 1 G/100ML
1 INJECTION INTRAVENOUS ONCE
Status: DISCONTINUED | OUTPATIENT
Start: 2021-11-24 | End: 2021-11-24

## 2021-11-24 RX ADMIN — SENNOSIDES AND DOCUSATE SODIUM 2 TABLET: 50; 8.6 TABLET ORAL at 17:06

## 2021-11-24 RX ADMIN — HYDROMORPHONE HYDROCHLORIDE 0.25 MG: 1 INJECTION, SOLUTION INTRAMUSCULAR; INTRAVENOUS; SUBCUTANEOUS at 09:48

## 2021-11-24 RX ADMIN — AMLODIPINE BESYLATE 5 MG: 5 TABLET ORAL at 20:04

## 2021-11-24 RX ADMIN — OXYCODONE 5 MG: 5 TABLET ORAL at 22:07

## 2021-11-24 RX ADMIN — METFORMIN HYDROCHLORIDE 500 MG: 500 TABLET, EXTENDED RELEASE ORAL at 17:07

## 2021-11-24 RX ADMIN — LIDOCAINE 1 PATCH: 50 PATCH TOPICAL at 09:56

## 2021-11-24 RX ADMIN — HYDROMORPHONE HYDROCHLORIDE 0.25 MG: 1 INJECTION, SOLUTION INTRAMUSCULAR; INTRAVENOUS; SUBCUTANEOUS at 04:39

## 2021-11-24 RX ADMIN — SODIUM CHLORIDE: 9 INJECTION, SOLUTION INTRAVENOUS at 20:58

## 2021-11-24 RX ADMIN — MAGNESIUM SULFATE HEPTAHYDRATE 2 G: 2 INJECTION, SOLUTION INTRAVENOUS at 13:32

## 2021-11-24 RX ADMIN — OXYCODONE 5 MG: 5 TABLET ORAL at 11:52

## 2021-11-24 RX ADMIN — THIAMINE HYDROCHLORIDE 100 MG: 100 INJECTION, SOLUTION INTRAMUSCULAR; INTRAVENOUS at 05:26

## 2021-11-24 RX ADMIN — KETOROLAC TROMETHAMINE 15 MG: 30 INJECTION, SOLUTION INTRAMUSCULAR at 05:48

## 2021-11-24 RX ADMIN — OXYCODONE 5 MG: 5 TABLET ORAL at 17:07

## 2021-11-24 RX ADMIN — METAXALONE 800 MG: 800 TABLET ORAL at 13:55

## 2021-11-24 RX ADMIN — POTASSIUM BICARBONATE 50 MEQ: 978 TABLET, EFFERVESCENT ORAL at 12:03

## 2021-11-24 RX ADMIN — POTASSIUM PHOSPHATE, MONOBASIC POTASSIUM PHOSPHATE, DIBASIC 30 MMOL: 224; 236 INJECTION, SOLUTION, CONCENTRATE INTRAVENOUS at 08:18

## 2021-11-24 RX ADMIN — METAXALONE 800 MG: 800 TABLET ORAL at 17:07

## 2021-11-24 RX ADMIN — LIDOCAINE 1 PATCH: 50 PATCH TOPICAL at 11:51

## 2021-11-24 RX ADMIN — HYDROMORPHONE HYDROCHLORIDE 0.25 MG: 1 INJECTION, SOLUTION INTRAMUSCULAR; INTRAVENOUS; SUBCUTANEOUS at 13:31

## 2021-11-24 ASSESSMENT — COGNITIVE AND FUNCTIONAL STATUS - GENERAL
WALKING IN HOSPITAL ROOM: A LITTLE
SUGGESTED CMS G CODE MODIFIER MOBILITY: CL
MOVING FROM LYING ON BACK TO SITTING ON SIDE OF FLAT BED: A LOT
MOBILITY SCORE: 13
CLIMB 3 TO 5 STEPS WITH RAILING: TOTAL
TURNING FROM BACK TO SIDE WHILE IN FLAT BAD: A LOT
STANDING UP FROM CHAIR USING ARMS: A LITTLE
MOVING TO AND FROM BED TO CHAIR: A LOT

## 2021-11-24 ASSESSMENT — ENCOUNTER SYMPTOMS
DIAPHORESIS: 0
BLOOD IN STOOL: 0
FEVER: 0
DIZZINESS: 0
DEPRESSION: 0
MEMORY LOSS: 1
BACK PAIN: 1
LOSS OF CONSCIOUSNESS: 0
COUGH: 0
NAUSEA: 0
WHEEZING: 0
HEADACHES: 0
HEMOPTYSIS: 0
BRUISES/BLEEDS EASILY: 0
CARDIOVASCULAR NEGATIVE: 1
MYALGIAS: 1
DIARRHEA: 0
FOCAL WEAKNESS: 0
PALPITATIONS: 0
NEUROLOGICAL NEGATIVE: 1
ABDOMINAL PAIN: 1
CONSTIPATION: 1
EYES NEGATIVE: 1
DOUBLE VISION: 0
VOMITING: 0
RESPIRATORY NEGATIVE: 1
HEARTBURN: 0
CHILLS: 0
NERVOUS/ANXIOUS: 0
SEIZURES: 0
NECK PAIN: 1

## 2021-11-24 ASSESSMENT — PAIN DESCRIPTION - PAIN TYPE
TYPE: ACUTE PAIN

## 2021-11-24 ASSESSMENT — GAIT ASSESSMENTS
DISTANCE (FEET): 15
DEVIATION: SHUFFLED GAIT;ANTALGIC
ASSISTIVE DEVICE: FRONT WHEEL WALKER
DISTANCE (FEET): 15
GAIT LEVEL OF ASSIST: MINIMAL ASSIST

## 2021-11-24 ASSESSMENT — VISUAL ACUITY: OU: 1

## 2021-11-24 ASSESSMENT — LIFESTYLE VARIABLES: SUBSTANCE_ABUSE: 0

## 2021-11-24 NOTE — PROGRESS NOTES
"Pt still reports \" very bad\" headache pain after administering IV dilaudid. MD notified regarding pt's headache. Dr. Whitman ordered Toradol IV x1 dose. MD updated MAR.  "

## 2021-11-24 NOTE — THERAPY
Physical Therapy   Initial Evaluation     Patient Name: Stacy Kelley  Age:  76 y.o., Sex:  female  Medical Record #: [id number]  Today's Date: 11/23/2021     Precautions  Precautions: TLSO (Thoracolumbosacral orthosis);Spinal / Back Precautions ;Nasogastric Tube    Assessment  Patient is 76 y.o. female with a diagnosis of back pain, L1 compression fracture, pt also with bowel obstruction and is currently NPO with an NG tube.  Pt is presenting with back and weakness limiting ability to mobilize safely. Recommend continued acute PT with f/u therapy at SNF prior to DC home.       Plan    Recommend Physical Therapy 4 times per week until therapy goals are met for the following treatments:  Bed Mobility, Gait Training, Neuro Re-Education / Balance, Stair Training, Therapeutic Activities and Therapeutic Exercises    DC Equipment Recommendations: Unable to determine at this time  Discharge Recommendations: Recommend post-acute placement for additional physical therapy services prior to discharge home        11/23/21 7363   Prior Living Situation   Housing / Facility 1 Story House   Steps Into Home 3   Steps In Home 0   Equipment Owned Front-Wheel Walker;Single Point Cane   Lives with - Patient's Self Care Capacity Spouse   Comments Pts spouse uses a w/c and would be unable to provide much assist, pt lives in [place]. Pt had 2 children that live in [place] but they work   Prior Level of Functional Mobility   Bed Mobility Independent   Transfer Status Independent   Ambulation Independent   Assistive Devices Used Single Point Cane  (or FWW)   Cognition    Comments Pt agreeable for PT, some confusion present, pt distracted and worried about pain and NG tube, son answering often for pt   Passive ROM Lower Body   Passive ROM Lower Body Not Tested   Active ROM Lower Body    Active ROM Lower Body  WDL   Strength Lower Body   Lower Body Strength  Not Tested   Comments due to back pain, pt able to move B LE against gravity, no  focal weakness noted   Sensation Lower Body   Lower Extremity Sensation   WDL   Balance Assessment   Sitting Balance (Static) Fair   Sitting Balance (Dynamic) Fair   Standing Balance (Static) Poor +   Standing Balance (Dynamic) Poor   Weight Shift Sitting Poor   Weight Shift Standing Poor   Comments stdg with FWW   Gait Analysis   Gait Level Of Assist Moderate Assist   Assistive Device Front Wheel Walker   Distance (Feet) 10   # of Times Distance was Traveled 1   Deviation Shuffled Gait;Bradykinetic   Comments TLSO donned in sitting prior to getting OOB   Bed Mobility    Supine to Sit Moderate Assist   Sit to Supine Moderate Assist   Rolling Moderate Assist to Lt.   Functional Mobility   Sit to Stand Moderate Assist   Bed, Chair, Wheelchair Transfer Moderate Assist   Transfer Method Stand Step   Activity Tolerance   Sitting Edge of Bed 2-3 min   Standing 2 min   Short Term Goals    Short Term Goal # 1 Pt will be able to perform bed mobility and sup <> sit Alonzo in 6 visits.   Short Term Goal # 2 Pt will be able to perform sit <> stand and transfer Alonzo in 6 visits so can DC home safely.   Short Term Goal # 3 Pt will be able to ambulate 150 ft with FWW Alonzo in 6 visits so can DC home safely.

## 2021-11-24 NOTE — PROGRESS NOTES
[facility] Medicine Daily Progress Note    Date of Service  11/24/2021    Chief Complaint  Stacy Kelley is a 76 y.o. female admitted 11/21/2021 with low back pain secondary to ground-level fall    Hospital Course  Stacy Kelley is a 76 y.o. female who lives in [place] with her  who himself has multiple medical problems.  Patient apparently fell on Tuesday of last week and did not tell the family.  The pain persisted and finally the daughter recognized the situation and brought her to [place] for evaluation.  Here then she took her to urgent care where she was evaluated imaging studies and recommendation was to get her an MRI.  They were able to complete the MRI 11/21 and this shows an acute L1 compression fracture thus she was brought to the hospital for continued management and evaluation.  Subsequently was found that the patient was also hyponatremic on admision.      The patient was noted to have nausea, vomiting. CT abd noted Dilated small and large bowel loops with no findings consistent with mechanical obstruction identified. NG tube was placed. Patient had bowel movements 11/23. NG tube output decreased. Try clear liquid    Interval Problem Update  NG tube, with 50cc dark output since last night. Will clamp NG tube and try clear liquid diet.  Optimization of electrolytes  IVF  Give thiamine  Need outpatient follow up with [facility] for kyphoplasty  PT/OT rec SNF, referred  Reports neck pain, ordered lidocaine patch and adjust position.   I have personally seen and examined the patient at bedside. I discussed the plan of care with patient, family, bedside RN, charge RN,  and pharmacy.    Consultants/Specialty  None    Code Status  Full Code    Disposition  Patient is not medically cleared.   Anticipate discharge to to skilled nursing facility.  I have placed the appropriate orders for post-discharge needs.    Review of Systems  Review of Systems   Constitutional: Positive for  malaise/fatigue. Negative for chills, diaphoresis and fever.   HENT: Negative.    Eyes: Negative.  Negative for double vision.   Respiratory: Negative.  Negative for cough, hemoptysis and wheezing.    Cardiovascular: Negative.  Negative for chest pain, palpitations and leg swelling.   Gastrointestinal: Positive for abdominal pain (improving) and constipation. Negative for blood in stool, diarrhea, heartburn, nausea and vomiting.   Genitourinary: Negative.  Negative for frequency, hematuria and urgency.   Musculoskeletal: Positive for back pain, myalgias and neck pain. Negative for joint pain.   Skin: Negative.  Negative for itching and rash.   Neurological: Negative.  Negative for dizziness, focal weakness, seizures, loss of consciousness and headaches.   Endo/Heme/Allergies: Negative.  Does not bruise/bleed easily.   Psychiatric/Behavioral: Positive for memory loss. Negative for depression, substance abuse and suicidal ideas. The patient is not nervous/anxious.    All other systems reviewed and are negative.       Physical Exam  Temp:  [36.3 °C (97.4 °F)-37 °C (98.6 °F)] 36.9 °C (98.4 °F)  Pulse:  [] 94  Resp:  [17-20] 20  BP: (142-153)/(73-84) 153/73  SpO2:  [90 %-93 %] 91 %    Physical Exam  Vitals and nursing note reviewed. Exam conducted with a chaperone present.   Constitutional:       General: She is awake.      Appearance: Normal appearance. She is well-developed, well-groomed and normal weight.   HENT:      Head: Normocephalic and atraumatic.      Jaw: There is normal jaw occlusion. No trismus.      Salivary Glands: Right salivary gland is not tender. Left salivary gland is not tender.      Right Ear: External ear normal.      Left Ear: External ear normal.      Mouth/Throat:      Mouth: Mucous membranes are dry.      Pharynx: Oropharynx is clear.   Eyes:      General: Lids are normal. Vision grossly intact.      Extraocular Movements: Extraocular movements intact.      Conjunctiva/sclera:  Conjunctivae normal.      Right eye: Right conjunctiva is not injected. No exudate.     Left eye: Left conjunctiva is not injected. No exudate.     Pupils: Pupils are equal, round, and reactive to light.   Neck:      Thyroid: No thyroid mass.      Vascular: No hepatojugular reflux or JVD.      Trachea: No abnormal tracheal secretions or tracheal deviation.   Cardiovascular:      Rate and Rhythm: Normal rate and regular rhythm. Occasional extrasystoles are present.     Pulses: Normal pulses.      Heart sounds: Normal heart sounds. No murmur heard.  No friction rub.   Pulmonary:      Effort: Pulmonary effort is normal.      Breath sounds: Examination of the right-lower field reveals decreased breath sounds. Examination of the left-lower field reveals decreased breath sounds. Decreased breath sounds present. No wheezing or rhonchi.   Abdominal:      General: Abdomen is flat. There is no distension.      Palpations: Abdomen is soft.      Tenderness: There is no abdominal tenderness (improving). There is no right CVA tenderness or left CVA tenderness.      Hernia: No hernia is present.      Comments: Bowel sounds appreciated   Genitourinary:     General: Normal vulva.   Musculoskeletal:      Cervical back: Full passive range of motion without pain, normal range of motion and neck supple. No rigidity. No muscular tenderness.      Lumbar back: Tenderness present. Decreased range of motion.      Right lower leg: No edema.      Left lower leg: No edema.   Skin:     General: Skin is warm and dry.      Capillary Refill: Capillary refill takes less than 2 seconds.      Coloration: Skin is not cyanotic or pale.      Findings: No abrasion or bruising.   Neurological:      General: No focal deficit present.      Mental Status: She is alert and oriented to person, place, and time. Mental status is at baseline.      Cranial Nerves: No cranial nerve deficit.      Sensory: No sensory deficit.      Motor: Motor function is intact.    Psychiatric:         Attention and Perception: Attention and perception normal.         Mood and Affect: Mood normal.         Speech: Speech normal.         Behavior: Behavior normal. Behavior is cooperative.         Thought Content: Thought content normal.         Cognition and Memory: Cognition and memory normal.         Judgment: Judgment normal.         Fluids    Intake/Output Summary (Last 24 hours) at 11/24/2021 1113  Last data filed at 11/24/2021 0700  Gross per 24 hour   Intake 499.8 ml   Output 50 ml   Net 449.8 ml       Laboratory  Recent Labs     11/22/21 0222 11/23/21  0944 11/24/21  0427   WBC 12.9* 8.1 9.7   RBC 4.41 4.46 4.35   HEMOGLOBIN 14.1 14.2 13.9   HEMATOCRIT 41.4 41.6 40.6   MCV 93.9 93.3 93.3   MCH 32.0 31.8 32.0   MCHC 34.1 34.1 34.2   RDW 41.6 41.1 40.9   PLATELETCT 485* 504* 458*   MPV 9.1 9.2 8.7*     Recent Labs     11/22/21 0222 11/23/21  0944 11/24/21  0427   SODIUM 131* 135 132*   POTASSIUM 3.5* 3.3* 3.1*   CHLORIDE 97 99 97   CO2 19* 21 21   GLUCOSE 158* 167* 149*   BUN 27* 19 17   CREATININE 0.54 0.41* 0.38*   CALCIUM 8.8 8.5 8.3*                   Imaging  [id number] VIEW   Final Result      1.  Again seen dilated small and large intestine.      2.  NG tube tip located just below the gastroesophageal junction.      DX-ABDOMEN FOR TUBE PLACEMENT   Final Result      Enteric tube tip projects over the stomach.      US-RUQ   Final Result      1.  Sludge and gallstones within the gallbladder. No evidence of biliary ductal dilatation.      2.  The liver is echogenic consistent with fatty change versus hepatocellular dysfunction.      DX-ABDOMEN FOR TUBE PLACEMENT   Final Result      Interval advancement of a nasogastric tube with the tip now overlying the expected location of the gastric body.      DX-ABDOMEN FOR TUBE PLACEMENT   Final Result      NG tube tip located at or just below the gastroesophageal junction. Advancement is recommended.      The report was called to the  patient's nurse at 12:13 PM on 11/22/2021      CT-ABDOMEN-PELVIS W/O   Final Result      1.  Dilated small and large bowel loops with no findings consistent with mechanical obstruction identified.      2.  Diverticulosis of the distal colon without evidence of diverticulitis.      3.  Right lower quadrant mesenteric fat stranding which could indicate mild inflammatory changes nonspecific. No bowel wall thickening. No free fluid. No free air.      4.  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      5.  Punctate nonobstructing right renal stones. No hydronephrosis.      6.  Cholelithiasis.      7.  Hepatic steatosis.      8.  Bilateral lung base atelectasis/mild bronchiectasis. Trace right basilar pleural effusion.      DX-CHEST-PORTABLE (1 VIEW)   Final Result      No acute cardiopulmonary abnormality identified.      DS-BONE DENSITY STUDY (DEXA)    (Results Pending)        Assessment/Plan  * Acute midline low back pain without sciatica  Assessment & Plan  Low midline back pain is secondary to L1 compression  Continue with multimodal pain managements, lidocaine patch    Fall  Assessment & Plan  PT/OT  Fall precautions    Hypomagnesemia  Assessment & Plan  Replace as indicated    Hypophosphatemia  Assessment & Plan  Replace as indicated    Hypokalemia  Assessment & Plan  Replace as indicated    Ileus (HCC)  Assessment & Plan  Stat CT of the abdomen performed I have reviewed the images myself: dilated small and large bowel loops with no findings consistent with mechanical obstruction identified  She did have bowel movements on 11/23 after received suppository   On NG, output improving to 50cc since 11/23 night. Will clamp NG tube and try clear liquid diet. If tolerating clear liquid, will remove NG tube  IVF  Monitor NG output  Optimization of electrolytes    Leukocytosis  Assessment & Plan  Secondary to bowel obstruction patient has leukocytosis  Continue monitor white blood cell  count, improving  Resolved    Cancer (HCC)  Assessment & Plan  Patient reports history of right-sided breast cancer status post mastectomy and this is in remission.  Radiation had diagnosis initially 18 years ago first and then did have a recurrence and now she is status post radiation and surgical intervention      Hypertension  Assessment & Plan  Monitor blood pressure and adjust medications to keep systolic blood pressure less than 140 diastolic under 90.  Continue with Norvasc 5 mg daily  Continue with metoprolol XL 25 mg daily      Closed compression fracture of body of L1 vertebra (HCC)  Assessment & Plan  Need outpatient referral  Pain management  PT/OT rec SNF placement, referred    Hyponatremia  Assessment & Plan  Initial sodium level is down secondary to dehydration  Improving with IVF      CN (constipation)  Assessment & Plan  Patient has chronic constipation and per the daughter has not had a bowel movement in at least 4 days but possibly longer as she just picked her up 4 days ago from [place].   Given suppository 11/23 and had bowel movements         VTE prophylaxis: enoxaparin ppx    I have performed a physical exam and reviewed and updated ROS and Plan today (11/24/2021). In review of yesterday's note (11/23/2021), there are no changes except as documented above.

## 2021-11-24 NOTE — CARE PLAN
The patient is Watcher - Medium risk of patient condition declining or worsening    Shift Goals  Clinical Goals: Pt will tolerate NG tube for decompression  Patient Goals: Pt christine sleep comfortably    Progress made toward(s) clinical / shift goals:  Pt tolerating NG tube (dark output) and resting comfortably. Denies any discomfort.       Problem: Fall Risk  Goal: Patient will remain free from falls  Outcome: Progressing   Call light and personal belongings in place. Bed alarm on.    Problem: Pain - Standard  Goal: Alleviation of pain or a reduction in pain to the patient’s comfort goal  Outcome: Progressing   Pt denies pain and resting comfortably.

## 2021-11-24 NOTE — PROGRESS NOTES
Pt AAO resting in bed. Denies any nausea, SOB, pain, numbness or tingling. NG tube obeserved, IVF infusing. POC discussed. Safety precautions in place. Call light and belongings w/n reach. Bed alarm on. No other needs at this time.

## 2021-11-24 NOTE — THERAPY
Physical Therapy   Daily Treatment     Patient Name: Stacy Kelley  Age:  76 y.o., Sex:  female  Medical Record #: [id number]  Today's Date: 11/24/2021     Precautions  Precautions: Spinal / Back Precautions ;TLSO (Thoracolumbosacral orthosis)    Assessment    Pt making some progress although required max encouragement to get OOB/ participate. Recommended to pt to use pure-wick only at night and during the day ambulate to the bathroom with RN staff.     Plan    Treatment plan modified to 5 times per week until therapy goals are met for the following treatments:  Bed Mobility, Gait Training, Neuro Re-Education / Balance, Stair Training, Therapeutic Activities and Therapeutic Exercises.    DC Equipment Recommendations: Unable to determine at this time  Discharge Recommendations: Recommend post-acute placement for additional physical therapy services prior to discharge home       11/24/21 8434   Cognition    Comments Pt reluctant to get OOB, max encouragement   Other Treatments   Other Treatments Provided gentle neck ROM/ posture exs while sitting EOB   Balance   Sitting Balance (Static) Fair   Sitting Balance (Dynamic) Fair   Standing Balance (Static) Fair -   Standing Balance (Dynamic) Poor +   Weight Shift Sitting Poor   Weight Shift Standing Poor   Skilled Intervention Postural Facilitation;Sequencing;Tactile Cuing;Verbal Cuing   Comments stdg with FWW   Gait Analysis   Gait Level Of Assist Minimal Assist   Assistive Device Front Wheel Walker   Distance (Feet) 15   # of Times Distance was Traveled 1   Deviation Shuffled Gait;Antalgic   Bed Mobility    Supine to Sit Moderate Assist   Rolling Moderate Assist to Lt.   Functional Mobility   Sit to Stand Minimal Assist   Bed, Chair, Wheelchair Transfer Minimal Assist   Transfer Method Stand Step  (with FWW)   Activity Tolerance   Standing 3-4 min    Short Term Goals    Short Term Goal # 1 Pt will be able to perform bed mobility and sup <> sit Alonzo in 6 visits.   Goal  Outcome # 1 Progressing as expected   Short Term Goal # 2 Pt will be able to perform sit <> stand and transfer Alonzo in 6 visits so can DC home safely.   Goal Outcome # 2 Progressing as expected   Short Term Goal # 3 Pt will be able to ambulate 150 ft with FWW Alonzo in 6 visits so can DC home safely.   Goal Outcome # 3 Progressing as expected

## 2021-11-24 NOTE — DISCHARGE PLANNING
ANTICIPATED DISCHARGE DISPOSITION: [facility]    ACTION: Patient discussed in IDT Rounds. MD will increase from NPO to Clear Liquid diet and to assess patient's toleration to determine date of discharge. Anticipate 1-2 days. BSRN states patient unable to sit in wheelchair at this time. Will request gurney if patient still unable to sit.     BARRIERS TO DISCHARGE: Medical clearance    PLAN: Set up [facility] transport for anticipated discharge 11/26/21.      ADDENDUM 1315: Will hold on [facility] until patient medically stable.         ADDENDUM 1610:  Patient's dtr Jennifer contacted this CM requesting CHOICE forms so that she can research facilities. CM informed her that it appears CHOICE had been received at bedside from her yesterday and that patient has been accepted by [facility] and pending medical clearance. She denies this and states she wanted to do further research. SNF CHOICE printed and CM to bedside. Instructed on Medicare.gov and [employer] ratings for guidance. CM states to notify CM or BS RN when she has made decision and we can resubmit. Jennifer states appreciative and has no further questions at this time.

## 2021-11-25 ENCOUNTER — APPOINTMENT (OUTPATIENT)
Dept: RADIOLOGY | Facility: MEDICAL CENTER | Age: 76
DRG: 552 | End: 2021-11-25
Attending: STUDENT IN AN ORGANIZED HEALTH CARE EDUCATION/TRAINING PROGRAM
Payer: MEDICARE

## 2021-11-25 LAB
ANION GAP SERPL CALC-SCNC: 15 MMOL/L (ref 7–16)
BASOPHILS # BLD AUTO: 0.8 % (ref 0–1.8)
BASOPHILS # BLD: 0.07 K/UL (ref 0–0.12)
BUN SERPL-MCNC: 15 MG/DL (ref 8–22)
CALCIUM SERPL-MCNC: 8.8 MG/DL (ref 8.4–10.2)
CHLORIDE SERPL-SCNC: 93 MMOL/L (ref 96–112)
CO2 SERPL-SCNC: 20 MMOL/L (ref 20–33)
CREAT SERPL-MCNC: 0.32 MG/DL (ref 0.5–1.4)
EOSINOPHIL # BLD AUTO: 0.16 K/UL (ref 0–0.51)
EOSINOPHIL NFR BLD: 1.8 % (ref 0–6.9)
ERYTHROCYTE [DISTWIDTH] IN BLOOD BY AUTOMATED COUNT: 43.1 FL (ref 35.9–50)
GLUCOSE SERPL-MCNC: 165 MG/DL (ref 65–99)
HCT VFR BLD AUTO: 42.4 % (ref 37–47)
HGB BLD-MCNC: 14.1 G/DL (ref 12–16)
IMM GRANULOCYTES # BLD AUTO: 0.08 K/UL (ref 0–0.11)
IMM GRANULOCYTES NFR BLD AUTO: 0.9 % (ref 0–0.9)
LYMPHOCYTES # BLD AUTO: 1.8 K/UL (ref 1–4.8)
LYMPHOCYTES NFR BLD: 20.5 % (ref 22–41)
MCH RBC QN AUTO: 32.3 PG (ref 27–33)
MCHC RBC AUTO-ENTMCNC: 33.3 G/DL (ref 33.6–35)
MCV RBC AUTO: 97.2 FL (ref 81.4–97.8)
MONOCYTES # BLD AUTO: 1.02 K/UL (ref 0–0.85)
MONOCYTES NFR BLD AUTO: 11.6 % (ref 0–13.4)
NEUTROPHILS # BLD AUTO: 5.66 K/UL (ref 2–7.15)
NEUTROPHILS NFR BLD: 64.4 % (ref 44–72)
NRBC # BLD AUTO: 0 K/UL
NRBC BLD-RTO: 0 /100 WBC
PHOSPHATE SERPL-MCNC: 2 MG/DL (ref 2.5–4.5)
PLATELET # BLD AUTO: 454 K/UL (ref 164–446)
PMV BLD AUTO: 9 FL (ref 9–12.9)
POTASSIUM SERPL-SCNC: 3.4 MMOL/L (ref 3.6–5.5)
RBC # BLD AUTO: 4.36 M/UL (ref 4.2–5.4)
SODIUM SERPL-SCNC: 128 MMOL/L (ref 135–145)
WBC # BLD AUTO: 8.8 K/UL (ref 4.8–10.8)

## 2021-11-25 PROCEDURE — 770006 HCHG ROOM/CARE - MED/SURG/GYN SEMI*

## 2021-11-25 PROCEDURE — A9270 NON-COVERED ITEM OR SERVICE: HCPCS | Performed by: STUDENT IN AN ORGANIZED HEALTH CARE EDUCATION/TRAINING PROGRAM

## 2021-11-25 PROCEDURE — 74018 RADEX ABDOMEN 1 VIEW: CPT

## 2021-11-25 PROCEDURE — 84100 ASSAY OF PHOSPHORUS: CPT

## 2021-11-25 PROCEDURE — 85025 COMPLETE CBC W/AUTO DIFF WBC: CPT

## 2021-11-25 PROCEDURE — 71045 X-RAY EXAM CHEST 1 VIEW: CPT

## 2021-11-25 PROCEDURE — A9270 NON-COVERED ITEM OR SERVICE: HCPCS | Performed by: INTERNAL MEDICINE

## 2021-11-25 PROCEDURE — 99233 SBSQ HOSP IP/OBS HIGH 50: CPT | Performed by: STUDENT IN AN ORGANIZED HEALTH CARE EDUCATION/TRAINING PROGRAM

## 2021-11-25 PROCEDURE — 700102 HCHG RX REV CODE 250 W/ 637 OVERRIDE(OP): Performed by: STUDENT IN AN ORGANIZED HEALTH CARE EDUCATION/TRAINING PROGRAM

## 2021-11-25 PROCEDURE — 700102 HCHG RX REV CODE 250 W/ 637 OVERRIDE(OP): Performed by: INTERNAL MEDICINE

## 2021-11-25 PROCEDURE — 700105 HCHG RX REV CODE 258: Performed by: STUDENT IN AN ORGANIZED HEALTH CARE EDUCATION/TRAINING PROGRAM

## 2021-11-25 PROCEDURE — 700101 HCHG RX REV CODE 250: Performed by: STUDENT IN AN ORGANIZED HEALTH CARE EDUCATION/TRAINING PROGRAM

## 2021-11-25 PROCEDURE — 36415 COLL VENOUS BLD VENIPUNCTURE: CPT

## 2021-11-25 PROCEDURE — 700111 HCHG RX REV CODE 636 W/ 250 OVERRIDE (IP): Performed by: INTERNAL MEDICINE

## 2021-11-25 PROCEDURE — 700105 HCHG RX REV CODE 258: Performed by: INTERNAL MEDICINE

## 2021-11-25 PROCEDURE — 80048 BASIC METABOLIC PNL TOTAL CA: CPT

## 2021-11-25 RX ORDER — SODIUM CHLORIDE, SODIUM LACTATE, POTASSIUM CHLORIDE, CALCIUM CHLORIDE 600; 310; 30; 20 MG/100ML; MG/100ML; MG/100ML; MG/100ML
INJECTION, SOLUTION INTRAVENOUS CONTINUOUS
Status: DISCONTINUED | OUTPATIENT
Start: 2021-11-25 | End: 2021-11-27

## 2021-11-25 RX ADMIN — OXYCODONE 5 MG: 5 TABLET ORAL at 11:24

## 2021-11-25 RX ADMIN — OXYCODONE 2.5 MG: 5 TABLET ORAL at 05:17

## 2021-11-25 RX ADMIN — THIAMINE HCL TAB 100 MG 100 MG: 100 TAB at 05:18

## 2021-11-25 RX ADMIN — SENNOSIDES AND DOCUSATE SODIUM 2 TABLET: 50; 8.6 TABLET ORAL at 05:18

## 2021-11-25 RX ADMIN — SODIUM CHLORIDE, POTASSIUM CHLORIDE, SODIUM LACTATE AND CALCIUM CHLORIDE: 600; 310; 30; 20 INJECTION, SOLUTION INTRAVENOUS at 18:19

## 2021-11-25 RX ADMIN — OXYCODONE 5 MG: 5 TABLET ORAL at 01:10

## 2021-11-25 RX ADMIN — DIBASIC SODIUM PHOSPHATE, MONOBASIC POTASSIUM PHOSPHATE AND MONOBASIC SODIUM PHOSPHATE 500 MG: 852; 155; 130 TABLET ORAL at 08:40

## 2021-11-25 RX ADMIN — LIDOCAINE 1 PATCH: 50 PATCH TOPICAL at 05:17

## 2021-11-25 RX ADMIN — METOPROLOL SUCCINATE 25 MG: 25 TABLET, EXTENDED RELEASE ORAL at 05:18

## 2021-11-25 RX ADMIN — METAXALONE 800 MG: 800 TABLET ORAL at 11:26

## 2021-11-25 RX ADMIN — ENOXAPARIN SODIUM 40 MG: 40 INJECTION SUBCUTANEOUS at 05:18

## 2021-11-25 RX ADMIN — METFORMIN HYDROCHLORIDE 500 MG: 500 TABLET, EXTENDED RELEASE ORAL at 08:39

## 2021-11-25 RX ADMIN — AMLODIPINE BESYLATE 5 MG: 5 TABLET ORAL at 22:08

## 2021-11-25 RX ADMIN — METAXALONE 800 MG: 800 TABLET ORAL at 05:18

## 2021-11-25 RX ADMIN — HYDROMORPHONE HYDROCHLORIDE 0.25 MG: 1 INJECTION, SOLUTION INTRAMUSCULAR; INTRAVENOUS; SUBCUTANEOUS at 02:34

## 2021-11-25 RX ADMIN — LIDOCAINE 1 PATCH: 50 PATCH TOPICAL at 18:17

## 2021-11-25 RX ADMIN — SODIUM CHLORIDE: 9 INJECTION, SOLUTION INTRAVENOUS at 09:10

## 2021-11-25 ASSESSMENT — ENCOUNTER SYMPTOMS
SEIZURES: 0
DIARRHEA: 0
VOMITING: 0
MYALGIAS: 1
LOSS OF CONSCIOUSNESS: 0
NAUSEA: 0
CONSTIPATION: 1
DEPRESSION: 0
NEUROLOGICAL NEGATIVE: 1
MEMORY LOSS: 1
BACK PAIN: 1
BLOOD IN STOOL: 0
NERVOUS/ANXIOUS: 0
HEMOPTYSIS: 0
PALPITATIONS: 0
DIZZINESS: 0
FEVER: 0
RESPIRATORY NEGATIVE: 1
CARDIOVASCULAR NEGATIVE: 1
HEADACHES: 0
WHEEZING: 0
HEARTBURN: 0
DIAPHORESIS: 0
NECK PAIN: 1
BRUISES/BLEEDS EASILY: 0
DOUBLE VISION: 0
FOCAL WEAKNESS: 0
COUGH: 0
CHILLS: 0
EYES NEGATIVE: 1
ABDOMINAL PAIN: 1

## 2021-11-25 ASSESSMENT — PAIN DESCRIPTION - PAIN TYPE
TYPE: ACUTE PAIN

## 2021-11-25 ASSESSMENT — LIFESTYLE VARIABLES: SUBSTANCE_ABUSE: 0

## 2021-11-25 ASSESSMENT — VISUAL ACUITY: OU: 1

## 2021-11-25 NOTE — CARE PLAN
Problem: Pain - Standard  Goal: Alleviation of pain or a reduction in pain to the patient’s comfort goal  Outcome: Not Progressing  Note: Pt continues to state pain is 8/10.    The patient is Stable - Low risk of patient condition declining or worsening    Shift Goals  Clinical Goals: Pain control and ambulation  Patient Goals: Rest and home    Progress made toward(s) clinical / shift goals:  Pain medication given per MAR.            Problem: Pain - Standard  Goal: Alleviation of pain or a reduction in pain to the patient’s comfort goal  Outcome: Not Progressing  Note: Pt continues to state pain is 8/10.

## 2021-11-25 NOTE — THERAPY
Occupational Therapy  Daily Treatment     Patient Name: Stacy Kelley  Age:  76 y.o., Sex:  female  Medical Record #: [id number]  Today's Date: 11/24/2021     Precautions  Precautions: Spinal / Back Precautions ,TLSO (Thoracolumbosacral orthosis)    Assessment    Pt required encouragement for OOB.  ModA bed mobility, MaxA UB and LB dressing,  Able to walk around bed to recliner chair with walker.  Limited by pain and back and neck.  Needs SNF to continue strengthening. OT will follow.     Plan    Continue current treatment plan.    DC Equipment Recommendations: Unable to determine at this time  Discharge Recommendations: Recommend post-acute placement for additional occupational therapy services prior to discharge home       11/24/21 1355   Cognition    Cognition / Consciousness X   Level of Consciousness Alert   Safety Awareness Impaired   Comments Pt needed max encouragement to get OOB and move, do ADL's   Active ROM Upper Body   Active ROM Upper Body  WDL   Dominant Hand Right   Other Treatments   Other Treatments Provided education on donning TLSO   Balance   Sitting Balance (Static) Fair   Sitting Balance (Dynamic) Fair   Standing Balance (Static) Fair -   Standing Balance (Dynamic) Poor +   Weight Shift Sitting Poor   Weight Shift Standing Poor   Skilled Intervention Postural Facilitation   Comments FWW in standing   Bed Mobility    Supine to Sit Moderate Assist   Sit to Supine Moderate Assist   Scooting Moderate Assist   Rolling Moderate Assist to Rt.   Activities of Daily Living   Upper Body Dressing Maximal Assist   Lower Body Dressing Maximal Assist   Functional Mobility   Sit to Stand Minimal Assist   Bed, Chair, Wheelchair Transfer Minimal Assist   Transfer Method Stand Step   Distance (Feet) 15   # of Times Distance was Traveled 1   Activity Tolerance   Sitting Edge of Bed 3 min   Standing 3-4 min   Comments limited by pain and decreased motivation   Patient / Family Goals   Patient / Family Goal #1  home when safe and mobile   Short Term Goals   Short Term Goal # 1 Pt will get up to EOB for ADL's with Becky in 3 visits   Goal Outcome # 1 Progressing slower than expected   Short Term Goal # 2 Pt will don TLSO with Becky in 4 visits   Goal Outcome # 2 Progressing slower than expected   Short Term Goal # 3 Pt will walk to BR with FWW and Becky in 4 visits   Goal Outcome # 3 Progressing slower than expected   Short Term Goal # 4 Pt will toilet Becky in 4 visits   Goal Outcome # 4 Progressing slower than expected   Short Term Goal # 5 Pt will stand 8 min at sink to groom supervised in 4 visits   Goal Outcome # 5 Progressing slower than expected

## 2021-11-25 NOTE — PROGRESS NOTES
Assumed care of patient. Bedside report received from night shift RN. Patient is sleeping at this time. Call light is within reach and fall precautions are in place. All needs met at this time. Hourly rounding in place.

## 2021-11-25 NOTE — PROGRESS NOTES
Assumed care of pt at 1915. Received report from Patience HATCH. Pt resting in bed. Pt stated pain was a 4 on the 0 to 10 scale. No medication intervention requested at this time. Pt has purewick in place. Assisted pt with repositioning and perineal care. Pt stated that she was able to tolerate her dinner well. NG removed per MD order. Pt tolerated well. No complaint of nausea. BS present in all quadrants. Discussed with pt if she would like to get to the commode overnight, pt stated she will try tomorrow. No other needs at this time. Call light in reach, bed in lowest position.

## 2021-11-25 NOTE — CARE PLAN
The patient is Stable - Low risk of patient condition declining or worsening    Shift Goals  Clinical Goals: Pt pain will be at a tolerable level. Pt will have NG removed and tolerate diet,  Patient Goals: Rest and home    Progress made toward(s) clinical / shift goals:  Pt required medication close to Q3 hour and required IV medication during the night as well. Pt repositioned to help with pain. NG was removed and pt has been tolerating diet.    Patient is not progressing towards the following goals:      Problem: Mobility  Goal: Patient's capacity to carry out activities will improve  11/25/2021 0401 by Joana Swan, R.N.  Outcome: Not Progressing    Flowsheets (Taken 11/25/2021 0359)  Mobility:   Provided assistive devices   Collaborated with PT/OT   Encouraged mobilization per interdisciplinary team recommendations   Administered pain management to allow progressive mobilization    Pt did not want to ambulate to commode overnight due to pain. Provided pt with purewick for comfort but encouraged pt to get up from bed in the morning.

## 2021-11-25 NOTE — PROGRESS NOTES
[facility] Medicine Daily Progress Note    Date of Service  11/25/2021    Chief Complaint  Stacy Kelley is a 76 y.o. female admitted 11/21/2021 with low back pain secondary to ground-level fall    Hospital Course  Stacy Kelley is a 76 y.o. female who lives in [place] with her  who himself has multiple medical problems.  Patient apparently fell on Tuesday of last week and did not tell the family.  The pain persisted and finally the daughter recognized the situation and brought her to [facility] for evaluation.  Here then she took her to urgent care where she was evaluated imaging studies and recommendation was to get her an MRI.  They were able to complete the MRI 11/21 and this shows an acute L1 compression fracture thus she was brought to the hospital for continued management and evaluation.  Subsequently was found that the patient was also hyponatremic on admision.      The patient was noted to have nausea, vomiting. CT abd noted Dilated small and large bowel loops with no findings consistent with mechanical obstruction identified. NG tube was placed. Patient had bowel movements 11/23. NG tube output decreased. NG discontinued on 11/24.    Interval Problem Update  Patient tolerates clear liquid, NG was removed on 11/24, will advance to full liquid.   Optimization of electrolytes, replaced K and phos  Dc IVF  Given thiamine  Need outpatient follow up with [facility] for kyphoplasty  PT/OT rec SNF, referred  Reports neck pain, ordered lidocaine patch and adjust position.   I have personally seen and examined the patient at bedside. I discussed the plan of care with patient, family, bedside RN, charge RN,  and pharmacy.    Consultants/Specialty  None    Code Status  Full Code    Disposition  Patient is not medically cleared.   Anticipate discharge to to skilled nursing facility.  I have placed the appropriate orders for post-discharge needs.    Review of Systems  Review of Systems    Constitutional: Positive for malaise/fatigue. Negative for chills, diaphoresis and fever.   HENT: Negative.    Eyes: Negative.  Negative for double vision.   Respiratory: Negative.  Negative for cough, hemoptysis and wheezing.    Cardiovascular: Negative.  Negative for chest pain, palpitations and leg swelling.   Gastrointestinal: Positive for abdominal pain (improving) and constipation. Negative for blood in stool, diarrhea, heartburn, nausea and vomiting.   Genitourinary: Negative.  Negative for frequency, hematuria and urgency.   Musculoskeletal: Positive for back pain, myalgias and neck pain. Negative for joint pain.   Skin: Negative.  Negative for itching and rash.   Neurological: Negative.  Negative for dizziness, focal weakness, seizures, loss of consciousness and headaches.   Endo/Heme/Allergies: Negative.  Does not bruise/bleed easily.   Psychiatric/Behavioral: Positive for memory loss. Negative for depression, substance abuse and suicidal ideas. The patient is not nervous/anxious.    All other systems reviewed and are negative.       Physical Exam  Temp:  [36.6 °C (97.8 °F)-36.9 °C (98.4 °F)] 36.9 °C (98.4 °F)  Pulse:  [] 99  Resp:  [16-20] 18  BP: (153-177)/(73-90) 154/80  SpO2:  [90 %-92 %] 90 %    Physical Exam  Vitals and nursing note reviewed. Exam conducted with a chaperone present.   Constitutional:       General: She is awake.      Appearance: Normal appearance. She is well-developed, well-groomed and normal weight.   HENT:      Head: Normocephalic and atraumatic.      Jaw: There is normal jaw occlusion. No trismus.      Salivary Glands: Right salivary gland is not tender. Left salivary gland is not tender.      Right Ear: External ear normal.      Left Ear: External ear normal.      Mouth/Throat:      Mouth: Mucous membranes are dry.      Pharynx: Oropharynx is clear.   Eyes:      General: Lids are normal. Vision grossly intact.      Extraocular Movements: Extraocular movements intact.       Conjunctiva/sclera: Conjunctivae normal.      Right eye: Right conjunctiva is not injected. No exudate.     Left eye: Left conjunctiva is not injected. No exudate.     Pupils: Pupils are equal, round, and reactive to light.   Neck:      Thyroid: No thyroid mass.      Vascular: No hepatojugular reflux or JVD.      Trachea: No abnormal tracheal secretions or tracheal deviation.   Cardiovascular:      Rate and Rhythm: Normal rate and regular rhythm. Occasional extrasystoles are present.     Pulses: Normal pulses.      Heart sounds: Normal heart sounds. No murmur heard.  No friction rub.   Pulmonary:      Effort: Pulmonary effort is normal.      Breath sounds: Examination of the right-lower field reveals decreased breath sounds. Examination of the left-lower field reveals decreased breath sounds. Decreased breath sounds present. No wheezing or rhonchi.   Abdominal:      General: Abdomen is flat. There is no distension.      Palpations: Abdomen is soft.      Tenderness: There is no abdominal tenderness (improving). There is no right CVA tenderness or left CVA tenderness.      Hernia: No hernia is present.      Comments: Bowel sounds appreciated   Genitourinary:     General: Normal vulva.   Musculoskeletal:      Cervical back: Full passive range of motion without pain, normal range of motion and neck supple. No rigidity. No muscular tenderness.      Lumbar back: Tenderness present. Decreased range of motion.      Right lower leg: No edema.      Left lower leg: No edema.   Skin:     General: Skin is warm and dry.      Capillary Refill: Capillary refill takes less than 2 seconds.      Coloration: Skin is not cyanotic or pale.      Findings: No abrasion or bruising.   Neurological:      General: No focal deficit present.      Mental Status: She is alert and oriented to person, place, and time. Mental status is at baseline.      Cranial Nerves: No cranial nerve deficit.      Sensory: No sensory deficit.      Motor: Motor  function is intact.   Psychiatric:         Attention and Perception: Attention and perception normal.         Mood and Affect: Mood normal.         Speech: Speech normal.         Behavior: Behavior normal. Behavior is cooperative.         Thought Content: Thought content normal.         Cognition and Memory: Cognition and memory normal.         Judgment: Judgment normal.         Fluids    Intake/Output Summary (Last 24 hours) at 11/25/2021 1021  Last data filed at 11/25/2021 0800  Gross per 24 hour   Intake 340 ml   Output 500 ml   Net -160 ml       Laboratory  Recent Labs     11/23/21  0944 11/24/21  0427 11/25/21  0142   WBC 8.1 9.7 8.8   RBC 4.46 4.35 4.36   HEMOGLOBIN 14.2 13.9 14.1   HEMATOCRIT 41.6 40.6 42.4   MCV 93.3 93.3 97.2   MCH 31.8 32.0 32.3   MCHC 34.1 34.2 33.3*   RDW 41.1 40.9 43.1   PLATELETCT 504* 458* 454*   MPV 9.2 8.7* 9.0     Recent Labs     11/23/21  0944 11/24/21  0427 11/25/21  0142   SODIUM 135 132* 128*   POTASSIUM 3.3* 3.1* 3.4*   CHLORIDE 99 97 93*   CO2 21 21 20   GLUCOSE 167* 149* 165*   BUN 19 17 15   CREATININE 0.41* 0.38* 0.32*   CALCIUM 8.5 8.3* 8.8                   Imaging  [id number] VIEW   Final Result      1.  Again seen dilated small and large intestine.      2.  NG tube tip located just below the gastroesophageal junction.      DX-ABDOMEN FOR TUBE PLACEMENT   Final Result      Enteric tube tip projects over the stomach.      US-RUQ   Final Result      1.  Sludge and gallstones within the gallbladder. No evidence of biliary ductal dilatation.      2.  The liver is echogenic consistent with fatty change versus hepatocellular dysfunction.      DX-ABDOMEN FOR TUBE PLACEMENT   Final Result      Interval advancement of a nasogastric tube with the tip now overlying the expected location of the gastric body.      DX-ABDOMEN FOR TUBE PLACEMENT   Final Result      NG tube tip located at or just below the gastroesophageal junction. Advancement is recommended.      The report was  called to the patient's nurse at 12:13 PM on 11/22/2021      CT-ABDOMEN-PELVIS W/O   Final Result      1.  Dilated small and large bowel loops with no findings consistent with mechanical obstruction identified.      2.  Diverticulosis of the distal colon without evidence of diverticulitis.      3.  Right lower quadrant mesenteric fat stranding which could indicate mild inflammatory changes nonspecific. No bowel wall thickening. No free fluid. No free air.      4.  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      5.  Punctate nonobstructing right renal stones. No hydronephrosis.      6.  Cholelithiasis.      7.  Hepatic steatosis.      8.  Bilateral lung base atelectasis/mild bronchiectasis. Trace right basilar pleural effusion.      DX-CHEST-PORTABLE (1 VIEW)   Final Result      No acute cardiopulmonary abnormality identified.      DS-BONE DENSITY STUDY (DEXA)    (Results Pending)        Assessment/Plan  * Acute midline low back pain without sciatica  Assessment & Plan  Low midline back pain is secondary to L1 compression  Continue with multimodal pain managements, lidocaine patch    Fall  Assessment & Plan  PT/OT  Fall precautions    Hypomagnesemia  Assessment & Plan  Replace as indicated    Hypophosphatemia  Assessment & Plan  Replace as indicated    Hypokalemia  Assessment & Plan  Replace as indicated    Ileus (HCC)  Assessment & Plan  Stat CT of the abdomen performed I have reviewed the images myself: dilated small and large bowel loops with no findings consistent with mechanical obstruction identified  She did have bowel movements on 11/23 after received suppository   On NG, output improving to 50cc since 11/23 night. Tolerating clear liquid, NG out 11/24, advance diet.   Optimization of electrolytes    Leukocytosis  Assessment & Plan  Secondary to bowel obstruction patient has leukocytosis  Continue monitor white blood cell count, improving  Resolved    Cancer  (HCC)  Assessment & Plan  Patient reports history of right-sided breast cancer status post mastectomy and this is in remission.  Radiation had diagnosis initially 18 years ago first and then did have a recurrence and now she is status post radiation and surgical intervention      Hypertension  Assessment & Plan  Monitor blood pressure and adjust medications to keep systolic blood pressure less than 140 diastolic under 90.  Continue with Norvasc 5 mg daily  Continue with metoprolol XL 25 mg daily      Closed compression fracture of body of L1 vertebra (HCC)  Assessment & Plan  Need outpatient referral  Pain management  PT/OT rec SNF placement, referred    Hyponatremia  Assessment & Plan  Initial sodium level is down secondary to dehydration  Improving with IVF      CN (constipation)  Assessment & Plan  Patient has chronic constipation and per the daughter has not had a bowel movement in at least 4 days but possibly longer as she just picked her up 4 days ago from [place].   Given suppository 11/23 and had bowel movements         VTE prophylaxis: enoxaparin ppx    I have performed a physical exam and reviewed and updated ROS and Plan today (11/25/2021). In review of yesterday's note (11/24/2021), there are no changes except as documented above.

## 2021-11-25 NOTE — PROGRESS NOTES
Patient is not eating and coughing frequently after taking medications. She has not had a bowel movement and vomited the prune juice nursing staff gave her. Dr. Fernandez notified by this RN and asked for KUB, chest XR, and continuous fluids. Awaiting orders from physician.

## 2021-11-25 NOTE — DISCHARGE PLANNING
0965 - Spoke to pts daughter, Jennifer, at bedside. Answered all questions, Jennifer states she chooses [facility] when pt is medically cleared.

## 2021-11-26 PROBLEM — K59.81 OGILVIE SYNDROME: Status: ACTIVE | Noted: 2021-11-26

## 2021-11-26 LAB
ANION GAP SERPL CALC-SCNC: 13 MMOL/L (ref 7–16)
BASOPHILS # BLD AUTO: 0.6 % (ref 0–1.8)
BASOPHILS # BLD: 0.05 K/UL (ref 0–0.12)
BUN SERPL-MCNC: 17 MG/DL (ref 8–22)
CALCIUM SERPL-MCNC: 9.4 MG/DL (ref 8.4–10.2)
CHLORIDE SERPL-SCNC: 94 MMOL/L (ref 96–112)
CO2 SERPL-SCNC: 24 MMOL/L (ref 20–33)
CREAT SERPL-MCNC: 0.35 MG/DL (ref 0.5–1.4)
EOSINOPHIL # BLD AUTO: 0.09 K/UL (ref 0–0.51)
EOSINOPHIL NFR BLD: 1.1 % (ref 0–6.9)
ERYTHROCYTE [DISTWIDTH] IN BLOOD BY AUTOMATED COUNT: 40.3 FL (ref 35.9–50)
GLUCOSE SERPL-MCNC: 178 MG/DL (ref 65–99)
HCT VFR BLD AUTO: 40.7 % (ref 37–47)
HGB BLD-MCNC: 14.2 G/DL (ref 12–16)
IMM GRANULOCYTES # BLD AUTO: 0.11 K/UL (ref 0–0.11)
IMM GRANULOCYTES NFR BLD AUTO: 1.4 % (ref 0–0.9)
LYMPHOCYTES # BLD AUTO: 2.13 K/UL (ref 1–4.8)
LYMPHOCYTES NFR BLD: 27 % (ref 22–41)
MAGNESIUM SERPL-MCNC: 1.4 MG/DL (ref 1.5–2.5)
MCH RBC QN AUTO: 32.3 PG (ref 27–33)
MCHC RBC AUTO-ENTMCNC: 34.9 G/DL (ref 33.6–35)
MCV RBC AUTO: 92.5 FL (ref 81.4–97.8)
MONOCYTES # BLD AUTO: 0.89 K/UL (ref 0–0.85)
MONOCYTES NFR BLD AUTO: 11.3 % (ref 0–13.4)
NEUTROPHILS # BLD AUTO: 4.63 K/UL (ref 2–7.15)
NEUTROPHILS NFR BLD: 58.6 % (ref 44–72)
NRBC # BLD AUTO: 0 K/UL
NRBC BLD-RTO: 0 /100 WBC
PHOSPHATE SERPL-MCNC: 2.3 MG/DL (ref 2.5–4.5)
PLATELET # BLD AUTO: 534 K/UL (ref 164–446)
PMV BLD AUTO: 9.3 FL (ref 9–12.9)
POTASSIUM SERPL-SCNC: 3.1 MMOL/L (ref 3.6–5.5)
RBC # BLD AUTO: 4.4 M/UL (ref 4.2–5.4)
SODIUM SERPL-SCNC: 131 MMOL/L (ref 135–145)
WBC # BLD AUTO: 7.9 K/UL (ref 4.8–10.8)

## 2021-11-26 PROCEDURE — 97535 SELF CARE MNGMENT TRAINING: CPT

## 2021-11-26 PROCEDURE — 700101 HCHG RX REV CODE 250: Performed by: STUDENT IN AN ORGANIZED HEALTH CARE EDUCATION/TRAINING PROGRAM

## 2021-11-26 PROCEDURE — 700111 HCHG RX REV CODE 636 W/ 250 OVERRIDE (IP): Performed by: STUDENT IN AN ORGANIZED HEALTH CARE EDUCATION/TRAINING PROGRAM

## 2021-11-26 PROCEDURE — 99233 SBSQ HOSP IP/OBS HIGH 50: CPT | Performed by: STUDENT IN AN ORGANIZED HEALTH CARE EDUCATION/TRAINING PROGRAM

## 2021-11-26 PROCEDURE — 770001 HCHG ROOM/CARE - MED/SURG/GYN PRIV*

## 2021-11-26 PROCEDURE — 97530 THERAPEUTIC ACTIVITIES: CPT

## 2021-11-26 PROCEDURE — 84100 ASSAY OF PHOSPHORUS: CPT

## 2021-11-26 PROCEDURE — 97116 GAIT TRAINING THERAPY: CPT

## 2021-11-26 PROCEDURE — 80048 BASIC METABOLIC PNL TOTAL CA: CPT

## 2021-11-26 PROCEDURE — 700102 HCHG RX REV CODE 250 W/ 637 OVERRIDE(OP): Performed by: STUDENT IN AN ORGANIZED HEALTH CARE EDUCATION/TRAINING PROGRAM

## 2021-11-26 PROCEDURE — 700111 HCHG RX REV CODE 636 W/ 250 OVERRIDE (IP): Performed by: NURSE PRACTITIONER

## 2021-11-26 PROCEDURE — 700111 HCHG RX REV CODE 636 W/ 250 OVERRIDE (IP): Performed by: INTERNAL MEDICINE

## 2021-11-26 PROCEDURE — 700102 HCHG RX REV CODE 250 W/ 637 OVERRIDE(OP): Performed by: INTERNAL MEDICINE

## 2021-11-26 PROCEDURE — 85025 COMPLETE CBC W/AUTO DIFF WBC: CPT

## 2021-11-26 PROCEDURE — A9270 NON-COVERED ITEM OR SERVICE: HCPCS | Performed by: INTERNAL MEDICINE

## 2021-11-26 PROCEDURE — 700105 HCHG RX REV CODE 258: Performed by: STUDENT IN AN ORGANIZED HEALTH CARE EDUCATION/TRAINING PROGRAM

## 2021-11-26 PROCEDURE — 83735 ASSAY OF MAGNESIUM: CPT

## 2021-11-26 PROCEDURE — A9270 NON-COVERED ITEM OR SERVICE: HCPCS | Performed by: STUDENT IN AN ORGANIZED HEALTH CARE EDUCATION/TRAINING PROGRAM

## 2021-11-26 RX ORDER — TRAMADOL HYDROCHLORIDE 50 MG/1
50 TABLET ORAL EVERY 6 HOURS PRN
Status: DISCONTINUED | OUTPATIENT
Start: 2021-11-26 | End: 2021-11-28

## 2021-11-26 RX ORDER — MAGNESIUM SULFATE HEPTAHYDRATE 40 MG/ML
2 INJECTION, SOLUTION INTRAVENOUS ONCE
Status: COMPLETED | OUTPATIENT
Start: 2021-11-26 | End: 2021-11-26

## 2021-11-26 RX ORDER — OXYCODONE HYDROCHLORIDE 5 MG/1
2.5 TABLET ORAL EVERY 6 HOURS PRN
Status: DISCONTINUED | OUTPATIENT
Start: 2021-11-26 | End: 2021-11-27

## 2021-11-26 RX ORDER — OXYCODONE HYDROCHLORIDE 5 MG/1
2.5 TABLET ORAL EVERY 6 HOURS PRN
Status: DISCONTINUED | OUTPATIENT
Start: 2021-11-26 | End: 2021-11-26

## 2021-11-26 RX ADMIN — METHYLNALTREXONE BROMIDE 12 MG: 12 INJECTION, SOLUTION SUBCUTANEOUS at 12:38

## 2021-11-26 RX ADMIN — METFORMIN HYDROCHLORIDE 500 MG: 500 TABLET, EXTENDED RELEASE ORAL at 10:20

## 2021-11-26 RX ADMIN — MAGNESIUM SULFATE HEPTAHYDRATE 2 G: 2 INJECTION, SOLUTION INTRAVENOUS at 12:30

## 2021-11-26 RX ADMIN — DIBASIC SODIUM PHOSPHATE, MONOBASIC POTASSIUM PHOSPHATE AND MONOBASIC SODIUM PHOSPHATE 500 MG: 852; 155; 130 TABLET ORAL at 05:03

## 2021-11-26 RX ADMIN — METOPROLOL SUCCINATE 25 MG: 25 TABLET, EXTENDED RELEASE ORAL at 05:03

## 2021-11-26 RX ADMIN — LIDOCAINE 1 PATCH: 50 PATCH TOPICAL at 12:31

## 2021-11-26 RX ADMIN — SODIUM CHLORIDE, POTASSIUM CHLORIDE, SODIUM LACTATE AND CALCIUM CHLORIDE: 600; 310; 30; 20 INJECTION, SOLUTION INTRAVENOUS at 18:47

## 2021-11-26 RX ADMIN — METFORMIN HYDROCHLORIDE 500 MG: 500 TABLET, EXTENDED RELEASE ORAL at 18:43

## 2021-11-26 RX ADMIN — SENNOSIDES AND DOCUSATE SODIUM 2 TABLET: 50; 8.6 TABLET ORAL at 05:03

## 2021-11-26 RX ADMIN — POTASSIUM PHOSPHATE, MONOBASIC POTASSIUM PHOSPHATE, DIBASIC 30 MMOL: 224; 236 INJECTION, SOLUTION, CONCENTRATE INTRAVENOUS at 10:20

## 2021-11-26 RX ADMIN — METAXALONE 800 MG: 800 TABLET ORAL at 18:43

## 2021-11-26 RX ADMIN — METAXALONE 800 MG: 800 TABLET ORAL at 05:03

## 2021-11-26 RX ADMIN — BISACODYL 10 MG: 10 SUPPOSITORY RECTAL at 05:03

## 2021-11-26 RX ADMIN — METAXALONE 800 MG: 800 TABLET ORAL at 12:31

## 2021-11-26 RX ADMIN — ENOXAPARIN SODIUM 40 MG: 40 INJECTION SUBCUTANEOUS at 05:03

## 2021-11-26 ASSESSMENT — COGNITIVE AND FUNCTIONAL STATUS - GENERAL
MOVING FROM LYING ON BACK TO SITTING ON SIDE OF FLAT BED: A LITTLE
DAILY ACTIVITIY SCORE: 13
MOBILITY SCORE: 17
DRESSING REGULAR LOWER BODY CLOTHING: A LOT
DRESSING REGULAR UPPER BODY CLOTHING: A LOT
TOILETING: A LOT
SUGGESTED CMS G CODE MODIFIER DAILY ACTIVITY: CL
TURNING FROM BACK TO SIDE WHILE IN FLAT BAD: A LITTLE
EATING MEALS: A LITTLE
CLIMB 3 TO 5 STEPS WITH RAILING: A LOT
WALKING IN HOSPITAL ROOM: A LITTLE
HELP NEEDED FOR BATHING: A LOT
SUGGESTED CMS G CODE MODIFIER MOBILITY: CK
MOVING TO AND FROM BED TO CHAIR: A LITTLE
PERSONAL GROOMING: A LOT
STANDING UP FROM CHAIR USING ARMS: A LITTLE

## 2021-11-26 ASSESSMENT — ENCOUNTER SYMPTOMS
MEMORY LOSS: 1
STRIDOR: 0
COUGH: 0
DEPRESSION: 0
NERVOUS/ANXIOUS: 0
ROS GI COMMENTS: ABDOMINAL DISTENSION
BACK PAIN: 1
DOUBLE VISION: 0
EYE DISCHARGE: 0
MEMORY LOSS: 0
POLYDIPSIA: 0
EYE PAIN: 0
DIARRHEA: 0
VOMITING: 0
DIARRHEA: 1
HEARTBURN: 0
SEIZURES: 0
SORE THROAT: 0
SHORTNESS OF BREATH: 0
CHILLS: 0
WEAKNESS: 1
HEMOPTYSIS: 0
MYALGIAS: 1
NAUSEA: 0
NECK PAIN: 1
RESPIRATORY NEGATIVE: 1
PALPITATIONS: 0
HEADACHES: 0
FEVER: 0
DIZZINESS: 0
CONSTIPATION: 1
BLOOD IN STOOL: 0
WHEEZING: 0
EYE REDNESS: 0
ABDOMINAL PAIN: 1
CARDIOVASCULAR NEGATIVE: 1
DIAPHORESIS: 0
BRUISES/BLEEDS EASILY: 0
LOSS OF CONSCIOUSNESS: 0
EYES NEGATIVE: 1
NEUROLOGICAL NEGATIVE: 1
FOCAL WEAKNESS: 0
FALLS: 1

## 2021-11-26 ASSESSMENT — GAIT ASSESSMENTS
DEVIATION: STEP TO
GAIT LEVEL OF ASSIST: SUPERVISED
DISTANCE (FEET): 30
ASSISTIVE DEVICE: FRONT WHEEL WALKER

## 2021-11-26 ASSESSMENT — LIFESTYLE VARIABLES: SUBSTANCE_ABUSE: 0

## 2021-11-26 ASSESSMENT — PAIN DESCRIPTION - PAIN TYPE: TYPE: ACUTE PAIN

## 2021-11-26 ASSESSMENT — VISUAL ACUITY: OU: 1

## 2021-11-26 NOTE — CARE PLAN
The patient is Watcher - Medium risk of patient condition declining or worsening    Shift Goals  Clinical Goals: Pt will mobilize to the commode at least twice for this shift, Pt will take suppository in the am  Patient Goals: Pain control  Family Goals: Up for meals    Progress made toward(s) clinical / shift goals:  Pt mobilized to the commode once this shift. Pt stated she will try to mobilize once more this morning. Pt to take suppository this am    Patient is not progressing towards the following goals:      Problem: Mobility  Goal: Patient's capacity to carry out activities will improve  Outcome: Not Progressing   Pt moves in bed more but ambulated to commode only once this shift. Pt stated she will attempt to mobilize again this morning. PT/OT are following patient.  Problem: Bowel Elimination  Goal: Establish and maintain regular bowel function  Outcome: Not Progressing   Pt educated on importance of mobilizing to help with bowel function. Pt agreed to suppository insert this am with medication pass. Encouraged pt to drink more fluids.    Pt ambulated in hallway and went up to commode at 0600. No BM yet this morning.

## 2021-11-26 NOTE — CONSULTS
Gastroenterology Initial Consult Note               Author:  DELANEY Howard Date & Time Created: 11/26/2021 12:00 PM       Patient ID:  Name:             Stacy Kelley  YOB: 1945  Age:                 76 y.o.  female  MRN:               [id number]      Referring Provider:  Orin Fernandez MD      Presenting Chief Complaint:  Abdominal distension, Colonic pseudo-obstruction      History of Present Illness:    She is a 76-year-old female patient seen in consultation for abdominal distention and colonic pseudoobstruction.  The patient was admitted to the hospital 11/21/2021 with low back pain.  Apparently last week on Tuesday the patient had a fall at her home injuring to [place].  She did not tell her family, but she had persistent back pain which prompted her to seek medical care.  She had an MRI on 11/21/2021 with findings of an acute L1 compression fracture, and was brought to the hospital for continued management.  Upon admission she was noted to have low sodium.  She was noted to have abdominal distention and developed some nausea and vomiting.  CT scanning of the abdomen and pelvis initially noted dilated small and large bowel loops with no transition point and no definite mechanical obstruction.  She initially had an NG tube placed for decompression with decreased output over 1 to 2 days.  She was given suppositories with 2 loose bowel movements on 1123 and the NG tube output decreased therefore it was discontinued on 1124.  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  Right colon dilated up to 15 cm.    Currently she denies nausea, vomiting.  She does have mild to moderate abdominal pain with distention.  She had 1 loose small bowel movement this morning.  BMP with sodium 131, potassium 3.1,  glucose 178, phosphorus 2.3, magnesium 1.4.  She has never seen a GI doctor nor had an endoscopy or colonoscopy.          Review of Systems:  Review of Systems   Constitutional: Positive for malaise/fatigue. Negative for chills and fever.   HENT: Negative for nosebleeds and sore throat.    Eyes: Negative for pain, discharge and redness.   Respiratory: Negative for cough, shortness of breath, wheezing and stridor.    Cardiovascular: Negative for chest pain, palpitations and leg swelling.   Gastrointestinal: Positive for abdominal pain and diarrhea. Negative for nausea and vomiting.        Abdominal distension   Genitourinary: Negative for dysuria and urgency.   Musculoskeletal: Positive for back pain, falls and joint pain.   Skin: Negative for itching and rash.   Neurological: Positive for weakness. Negative for dizziness and headaches.   Endo/Heme/Allergies: Negative for environmental allergies and polydipsia.   Psychiatric/Behavioral: Negative for memory loss and substance abuse.             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;  Service:    • MASTECTOMY Bilateral 2012   • LUMPECTOMY  1997    right breast; excision lymph nodes   • THYROID LOBECTOMY  1982   • ABDOMINAL HYSTERECTOMY TOTAL  1980    Hysterectomy, Total Abdominal   • TUBAL LIGATION  1972    oophorectomy   • BREAST RECONSTRUCTION Bilateral     after mastectomy  by  [facility] Medications:  Current Facility-Administered Medications   Medication Dose Frequency Provider Last Rate Last Admin   • potassium phosphate IVPB 30 mmol in 500 mL D5W (premix)  30 mmol Once Orin Fernandez M.D. 83.3 mL/hr at 11/26/21 1020 30 mmol at 11/26/21 1020   • magnesium sulfate IVPB premix 2 g  2 g Once Orin Fernandez M.D.       • traMADol (ULTRAM) 50 MG tablet 50 mg  50 mg Q6HRS PRN Orin Fernandez M.D.       • oxyCODONE immediate-release (ROXICODONE) tablet 2.5 mg  2.5 mg Q6HRS PRN Orin Fernandez M.D.       • methylnaltrexone (RELISTOR) injection 12 mg  12 mg Once Romeo Flannery, A.P.R.N.       • phosphorus (K-Phos-Neutral) per tablet 500 mg  500 mg BID Orin Fernandez M.D.   500 mg at 11/26/21 0503   • lactated ringers infusion   Continuous Orin Fernandez M.D. 50 mL/hr at 11/25/21 1819 New Bag at 11/25/21 1819   • lidocaine (LIDODERM) 5 % 1 Patch  1 Patch DAILY Orin Fernandez M.D.   1 Patch at 11/25/21 0517   • acetaminophen (Tylenol) tablet 650 mg  650 mg Q4HRS PRN Orin Fernandez M.D.   650 mg at 11/23/21 1402   • lidocaine (LIDODERM) 5 % 1 Patch  1 Patch Q24HR Orin Fernandez M.D.   1 Patch at 11/25/21 1817   • amLODIPine (NORVASC) tablet 5 mg  5 mg QHS Denita Lombarid D.O.   5 mg at 11/25/21 2208   • metoprolol SR (TOPROL XL) tablet 25 mg  25 mg DAILY DenitaWIL RobertsO.   25 mg at 11/26/21 0503   • enoxaparin (LOVENOX) inj 40 mg  40 mg DAILY WIL CastroO.   40 mg at 11/26/21 0503   • enalaprilat (Vasotec) injection 1.25 mg 1  mL  1.25 mg Q6HRS PRN WIL CastroO.       • labetalol (NORMODYNE/TRANDATE) injection 10 mg  10 mg Q4HRS PRN WIL CastroO.       • ondansetron (ZOFRAN) syringe/vial injection 4 mg  4 mg Q4HRS PRN KEYUR Castro.O.       • ondansetron (ZOFRAN ODT) dispertab 4 mg  4 mg Q4HRS PRN Denita Lombardi D.O.       • Pharmacy Consult Request ...Pain Management Review 1 Each  1 Each PHARMACY TO DOSE Denita Lombardi D.O.       • metaxalone (Skelaxin) tablet 800 mg  800 mg TID KEYUR [email or web address]   800 mg at 11/26/21 0503   • metFORMIN ER (GLUCOPHAGE XR) tablet 500 mg  500 mg BID WITH MEALS WIL CastroORobyn   500 mg at 11/26/21 1020   Last reviewed on 11/21/2021  2:21 PM by Dorothy Eller        Current Outpatient Medications:  Medications Prior to Admission   Medication Sig Dispense Refill Last Dose   • glipiZIDE (GLUCOTROL) 5 MG Tab Take 5 mg by mouth 2 times a day.   11/21/2021 at 0630   • donepezil (ARICEPT) 5 MG Tab Take 5 mg by mouth every evening.   11/20/2021 at 1830   • ibuprofen (MOTRIN) 200 MG Tab Take 200 mg by mouth 2 times a day.   11/21/2021 at 0630   • Apoaequorin (PREVAGEN PO) Take 1 Tablet by mouth every day.   11/21/2021 at 0630   • potassium chloride ER (KLOR-CON) 10 MEQ tablet    Not sure   • meclizine (ANTIVERT) 25 MG Tab Take 25 mg by mouth 3 times a day as needed for Nausea/Vomiting.   > 2 days at Unknown   • HYDROcodone-acetaminophen (NORCO) 5-325 MG Tab per tablet Take 1 Tablet by mouth every 6 hours as needed. Indications: Pain   NEW RX   • metFORMIN ER (GLUCOPHAGE XR) 500 MG TABLET SR 24 HR Take 500-1,500 mg by mouth 2 times a day. Pt takes 500MG in AM and 1500MG every evening   11/21/2021 at 0700   • hydrocodone-acetaminophen (NORCO) 7.5-325 MG per tablet Take 1-2 Tabs by mouth every 6 hours as needed for Moderate Pain. 50 Tab 0 11/21/2021 at 0700   • B Complex Vitamins (B COMPLEX 1 PO) Take 1 Tablet by mouth every day.   11/21/2021 at 0630   • furosemide (LASIX) 20 MG Tab Take 20  mg by mouth as needed. Indications: Edema   > 2 weeks at Unknown   • amlodipine (NORVASC) 5 MG Tab Take 5 mg by mouth every bedtime.   2021 at 0630   • metoprolol SR (TOPROL XL) 25 MG TABLET SR 24 HR Take 25 mg by mouth every day.   2021 at 0630         Medication Allergies:  No Known Allergies      Family Medical History:  Family History   Problem Relation Age of Onset   • Heart Disease Other    • Hypertension Mother    • Stroke Mother    • Diabetes Father    • Diabetes Brother          Social History:  Social History     Socioeconomic History   • Marital status:      Spouse name: Not on file   • Number of children: Not on file   • Years of education: Not on file   • Highest education level: Not on file   Occupational History   • Not on file   Tobacco Use   • Smoking status: Former Smoker     Packs/day: 1.00     Years: 20.00     Pack years: 20.00     Quit date: 1997     Years since quittin.9   • Smokeless tobacco: Never Used   Substance and Sexual Activity   • Alcohol use: Yes     Comment: 2 per week   • Drug use: No   • Sexual activity: Not on file   Other Topics Concern   • Not on file   Social History Narrative   • Not on file     Social Determinants of Health     Financial Resource Strain:    • Difficulty of Paying Living Expenses: Not on file   Food Insecurity:    • Worried About Running Out of Food in the Last Year: Not on file   • Ran Out of Food in the Last Year: Not on file   Transportation Needs:    • Lack of Transportation (Medical): Not on file   • Lack of Transportation (Non-Medical): Not on file   Physical Activity:    • Days of Exercise per Week: Not on file   • Minutes of Exercise per Session: Not on file   Stress:    • Feeling of Stress : Not on file   Social Connections:    • Frequency of Communication with Friends and Family: Not on file   • Frequency of Social Gatherings with Friends and Family: Not on file   • Attends Mu-ism Services: Not on file   • Active Member  "of Clubs or Organizations: Not on file   • Attends Club or Organization Meetings: Not on file   • Marital Status: Not on file   Intimate Partner Violence:    • Fear of Current or Ex-Partner: Not on file   • Emotionally Abused: Not on file   • Physically Abused: Not on file   • Sexually Abused: Not on file   Housing Stability:    • Unable to Pay for Housing in the Last Year: Not on file   • Number of Places Lived in the Last Year: Not on file   • Unstable Housing in the Last Year: Not on file         Vital signs:  Weight/BMI: Body mass index is 32.17 kg/m².  /73   Pulse (!) 113   Temp 36.6 °C (97.8 °F) (Temporal)   Resp 17   Ht 1.626 m (5' 4\")   Wt 85 kg (187 lb 6.3 oz)   SpO2 94%   Vitals:    11/25/21 2000 11/25/21 2300 11/26/21 0400 11/26/21 1000   BP: 154/83 133/80 152/92 149/73   Pulse: (!) 113 88 (!) 113 (!) 113   Resp: 19 18 18 17   Temp:  36.8 °C (98.2 °F) 37.3 °C (99.1 °F) 36.6 °C (97.8 °F)   TempSrc:  Temporal Temporal Temporal   SpO2: 91% 95% 92% 94%   Weight:       Height:         Oxygen Therapy:  Pulse Oximetry: 94 %, O2 (LPM): 0, O2 Delivery Device: None - Room Air    Intake/Output Summary (Last 24 hours) at 11/26/2021 1200  Last data filed at 11/26/2021 0700  Gross per 24 hour   Intake 360 ml   Output 250 ml   Net 110 ml         Physical Exam:  Physical Exam  Vitals and nursing note reviewed.   Constitutional:       Appearance: She is obese. She is ill-appearing.   HENT:      Head: Normocephalic and atraumatic.      Right Ear: External ear normal.      Left Ear: External ear normal.      Nose: Nose normal.      Mouth/Throat:      Mouth: Mucous membranes are dry.      Pharynx: Oropharynx is clear.   Eyes:      General: No scleral icterus.     Extraocular Movements: Extraocular movements intact.      Pupils: Pupils are equal, round, and reactive to light.   Cardiovascular:      Rate and Rhythm: Normal rate and regular rhythm.      Pulses: Normal pulses.      Heart sounds: Normal heart sounds. " No murmur heard.      Pulmonary:      Effort: Pulmonary effort is normal. No respiratory distress.      Breath sounds: Normal breath sounds. No wheezing or rales.   Abdominal:      General: There is distension (DIffuse).      Tenderness: There is abdominal tenderness (Generalized).      Comments: High pitched tinkling bowel sounds   Musculoskeletal:      Cervical back: Neck supple.      Right lower leg: No edema.      Left lower leg: No edema.   Lymphadenopathy:      Cervical: No cervical adenopathy.   Skin:     General: Skin is warm and dry.   Neurological:      General: No focal deficit present.      Mental Status: She is oriented to person, place, and time.   Psychiatric:         Thought Content: Thought content normal.         Judgment: Judgment normal.           Labs:  Recent Labs     11/24/21 0427 11/25/21 0142 11/26/21 0206   SODIUM 132* 128* 131*   POTASSIUM 3.1* 3.4* 3.1*   CHLORIDE 97 93* 94*   CO2 21 20 24   BUN 17 15 17   CREATININE 0.38* 0.32* 0.35*   MAGNESIUM 1.6  --  1.4*   PHOSPHORUS 2.1* 2.0* 2.3*   CALCIUM 8.3* 8.8 9.4     Recent Labs     11/24/21 0427 11/25/21 0142 11/26/21  0206   GLUCOSE 149* 165* 178*     Recent Labs     11/24/21 0427 11/25/21 0142 11/26/21  0206   WBC 9.7 8.8 7.9   NEUTSPOLYS 67.70 64.40 58.60   LYMPHOCYTES 18.50* 20.50* 27.00   MONOCYTES 9.70 11.60 11.30   EOSINOPHILS 2.90 1.80 1.10   BASOPHILS 0.60 0.80 0.60     Recent Labs     11/24/21 0427 11/25/21 0142 11/26/21  0206   RBC 4.35 4.36 4.40   HEMOGLOBIN 13.9 14.1 14.2   HEMATOCRIT 40.6 42.4 40.7   PLATELETCT 458* 454* 534*     Recent Results (from the past 24 hour(s))   CBC WITH DIFFERENTIAL    Collection Time: 11/26/21  2:06 AM   Result Value Ref Range    WBC 7.9 4.8 - 10.8 K/uL    RBC 4.40 4.20 - 5.40 M/uL    Hemoglobin 14.2 12.0 - 16.0 g/dL    Hematocrit 40.7 37.0 - 47.0 %    MCV 92.5 81.4 - 97.8 fL    MCH 32.3 27.0 - 33.0 pg    MCHC 34.9 33.6 - 35.0 g/dL    RDW 40.3 35.9 - 50.0 fL    Platelet Count 534 (H) 164  - 446 K/uL    MPV 9.3 9.0 - 12.9 fL    Neutrophils-Polys 58.60 44.00 - 72.00 %    Lymphocytes 27.00 22.00 - 41.00 %    Monocytes 11.30 0.00 - 13.40 %    Eosinophils 1.10 0.00 - 6.90 %    Basophils 0.60 0.00 - 1.80 %    Immature Granulocytes 1.40 (H) 0.00 - 0.90 %    Nucleated RBC 0.00 /100 WBC    Neutrophils (Absolute) 4.63 2.00 - 7.15 K/uL    Lymphs (Absolute) 2.13 1.00 - 4.80 K/uL    Monos (Absolute) 0.89 (H) 0.00 - 0.85 K/uL    Eos (Absolute) 0.09 0.00 - 0.51 K/uL    Baso (Absolute) 0.05 0.00 - 0.12 K/uL    Immature Granulocytes (abs) 0.11 0.00 - 0.11 K/uL    NRBC (Absolute) 0.00 K/uL   Basic Metabolic Panel    Collection Time: 11/26/21  2:06 AM   Result Value Ref Range    Sodium 131 (L) 135 - 145 mmol/L    Potassium 3.1 (L) 3.6 - 5.5 mmol/L    Chloride 94 (L) 96 - 112 mmol/L    Co2 24 20 - 33 mmol/L    Glucose 178 (H) 65 - 99 mg/dL    Bun 17 8 - 22 mg/dL    Creatinine 0.35 (L) 0.50 - 1.40 mg/dL    Calcium 9.4 8.4 - 10.2 mg/dL    Anion Gap 13.0 7.0 - 16.0   PHOSPHORUS    Collection Time: 11/26/21  2:06 AM   Result Value Ref Range    Phosphorus 2.3 (L) 2.5 - 4.5 mg/dL   ESTIMATED GFR    Collection Time: 11/26/21  2:06 AM   Result Value Ref Range    GFR If African American >60 >60 mL/min/1.73 m 2    GFR If Non African American >60 >60 mL/min/1.73 m 2   MAGNESIUM    Collection Time: 11/26/21  2:06 AM   Result Value Ref Range    Magnesium 1.4 (L) 1.5 - 2.5 mg/dL         Radiology Review:  DX-CHEST-PORTABLE (1 VIEW)   Final Result      No acute cardiac or pulmonary abnormalities are identified.      ON-SAESBTL-5 VIEW   Final Result      Persistent dilation of bowel predominately effecting the colon extending down to the rectum likely due to dysmotility or Taryn syndrome.      Right colon again dilated up to 15 cm.      [id number] VIEW   Final Result      1.  Again seen dilated small and large intestine.      2.  NG tube tip located just below the gastroesophageal junction.      DX-ABDOMEN FOR TUBE PLACEMENT    Final Result      Enteric tube tip projects over the stomach.      US-RUQ   Final Result      1.  Sludge and gallstones within the gallbladder. No evidence of biliary ductal dilatation.      2.  The liver is echogenic consistent with fatty change versus hepatocellular dysfunction.      DX-ABDOMEN FOR TUBE PLACEMENT   Final Result      Interval advancement of a nasogastric tube with the tip now overlying the expected location of the gastric body.      DX-ABDOMEN FOR TUBE PLACEMENT   Final Result      NG tube tip located at or just below the gastroesophageal junction. Advancement is recommended.      The report was called to the patient's nurse at 12:13 PM on 11/22/2021      CT-ABDOMEN-PELVIS W/O   Final Result      1.  Dilated small and large bowel loops with no findings consistent with mechanical obstruction identified.      2.  Diverticulosis of the distal colon without evidence of diverticulitis.      3.  Right lower quadrant mesenteric fat stranding which could indicate mild inflammatory changes nonspecific. No bowel wall thickening. No free fluid. No free air.      4.  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      5.  Punctate nonobstructing right renal stones. No hydronephrosis.      6.  Cholelithiasis.      7.  Hepatic steatosis.      8.  Bilateral lung base atelectasis/mild bronchiectasis. Trace right basilar pleural effusion.      DX-CHEST-PORTABLE (1 VIEW)   Final Result      No acute cardiopulmonary abnormality identified.      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  Colonic pseudoobstruction secondary to recent traumatic back injury and opioid use with decreased ambulation as well as abnormal electrolytes    2.  Hyponatremia, hypokalemia, hypophosphatemia, hypomagnesemia    3.  Closed compression fracture of the body of L1 vertebrae    4.  Hypertension    5.  Obesity    Plan:  1.  1 dose of methylnaltrexone 12 mg subcutaneous injection  2.  Insert rectal tube such as rectal trumpet or nasal trumpet to stimulate rectum and further colonic movement  3.  N.p.o.  4.  Repeat KUB tomorrow a.m.  5.  Recommend avoidance of opiates  6.  Recommend ambulation  7.  Low threshold for reinstituting NG tube to low intermittent suction for decompression  8.  Continue electrolyte replacement    If Relistor and rectal tube are ineffective, may need to consider neostigmine a.m./or colonic decompression with colonoscopy and insertion of decompression tube.    DIVINA Howard.      Thank you for inviting me to participate in the care of this patient. Please do not hesitate to call GI consultants with additional questions/concerns or changes in the patient's clinical status at [phone].    Core Quality Measures   Reviewed items:  Labs, Medications and Radiology reports reviewed

## 2021-11-26 NOTE — THERAPY
Physical Therapy   Daily Treatment     Patient Name: Stacy Kelley  Age:  76 y.o., Sex:  female  Medical Record #: [id number]  Today's Date: 11/26/2021     Precautions  Precautions: Fall Risk;Swallow Precautions ( See Comments);TLSO (Thoracolumbosacral orthosis)    Assessment    Pt reluctant to get out of bed.She is improving with bed mob,transfers and ambulation    Plan    Continue current treatment plan.      11/26/21 1400   Total Time Spent   Total Time Spent (Mins) 25   Charge Group   PT Gait Training 1   PT Therapeutic Activities 1   Pain 0 - 10 Group   Therapist Pain Assessment 2   Balance   Sitting Balance (Static) Fair +   Sitting Balance (Dynamic) Fair +   Standing Balance (Static) Fair   Standing Balance (Dynamic) Fair   Weight Shift Sitting Fair   Weight Shift Standing Fair   Gait Analysis   Gait Level Of Assist Supervised   Assistive Device Front Wheel Walker   Distance (Feet) 30   # of Times Distance was Traveled 1   Deviation Step To   Bed Mobility    Supine to Sit Minimal Assist   Sit to Supine Minimal Assist   Scooting Moderate Assist   Functional Mobility   Sit to Stand Minimal Assist   Bed, Chair, Wheelchair Transfer Minimal Assist   Transfer Method Stand Step   How much difficulty does the patient currently have...   Turning over in bed (including adjusting bedclothes, sheets and blankets)? 3   Sitting down on and standing up from a chair with arms (e.g., wheelchair, bedside commode, etc.) 3   Moving from lying on back to sitting on the side of the bed? 3   How much help from another person does the patient currently need...   Moving to and from a bed to a chair (including a wheelchair)? 3   Need to walk in a hospital room? 3   Climbing 3-5 steps with a railing? 2   6 clicks Mobility Score 17   Activity Tolerance   Sitting Edge of Bed 10   Standing 7   Short Term Goals    Short Term Goal # 1 Pt will be able to perform bed mobility and sup <> sit Alonzo in 6 visits.   Goal Outcome # 1 Progressing  slower than expected   Short Term Goal # 2 Pt will be able to perform sit <> stand and transfer Alonzo in 6 visits so can DC home safely.   Goal Outcome # 2 Progressing as expected   Short Term Goal # 3 Pt will be able to ambulate 150 ft with FWW Alonzo in 6 visits so can DC home safely.   Goal Outcome # 3 Progressing as expected   Anticipated Discharge Equipment and Recommendations   Discharge Recommendations Recommend post-acute placement for additional physical therapy services prior to discharge home   Interdisciplinary Plan of Care Collaboration   IDT Collaboration with  Nursing   Session Information   Date / Session Number  11/26-3 2/5 11/29       DC Equipment Recommendations: Unable to determine at this time  Discharge Recommendations: (P) Recommend post-acute placement for additional physical therapy services prior to discharge home

## 2021-11-26 NOTE — THERAPY
Occupational Therapy  Daily Treatment     Patient Name: Stacy Kelley  Age:  76 y.o., Sex:  female  Medical Record #: [id number]  Today's Date: 11/26/2021     Precautions: (P) Fall Risk,Swallow Precautions ( See Comments),TLSO (Thoracolumbosacral orthosis)    Assessment    Pt more agreeable today with dtr present during session. Pt demonstrated mod A for BSC txf, mod A for toileting, max A for donning TLSO, and max A for LB dressing. Pt continues to be limited by weakness, impaired balance, poor activity tolerance, and behavior. Pt motivated by dtr's verbal commands for encouragement. Will continue to follow for skilled OT.    Plan    Continue current treatment plan.    DC Equipment Recommendations: (P) Unable to determine at this time  Discharge Recommendations: (P) Recommend post-acute placement for additional occupational therapy services prior to discharge home       11/26/21 0856   Precautions   Precautions Fall Risk;Swallow Precautions ( See Comments);TLSO (Thoracolumbosacral orthosis)   Cognition    Comments pt reluctant but more participatory with dtr present during session   Other Treatments   Other Treatments Provided max education on benfits of OOB activity to pt and dtr   Balance   Sitting Balance (Static) Fair   Sitting Balance (Dynamic) Fair   Standing Balance (Static) Fair -   Standing Balance (Dynamic) Fair -   Weight Shift Sitting Poor   Weight Shift Standing Poor   Skilled Intervention Verbal Cuing;Tactile Cuing;Sequencing;Postural Facilitation;Compensatory Strategies   Comments with FWW   Activities of Daily Living   Grooming Minimal Assist;Seated   Upper Body Dressing Maximal Assist  (TLSO)   Lower Body Dressing Maximal Assist   Toileting Maximal Assist   Skilled Intervention Verbal Cuing;Tactile Cuing;Sequencing;Compensatory Strategies   Comments toileting 2x, required assist with clothing management and wiping   How much help from another person does the patient currently need...   Putting on  and taking off regular lower body clothing? 2   Bathing (including washing, rinsing, and drying)? 2   Toileting, which includes using a toilet, bedpan, or urinal? 2   Putting on and taking off regular upper body clothing? 2   Taking care of personal grooming such as brushing teeth? 2   Eating meals? 3   6 Clicks Daily Activity Score 13   Functional Mobility   Sit to Stand Minimal Assist   Bed, Chair, Wheelchair Transfer Moderate Assist   Toilet Transfers Moderate Assist  (BSC)   Transfer Method Stand Step   Mobility BSC>EOB>recliner>BSC>recliner   Skilled Intervention Verbal Cuing;Tactile Cuing;Sequencing;Compensatory Strategies   Activity Tolerance   Sitting in Chair 15 min   Sitting Edge of Bed 10 min   Standing 3 min x3   Patient / Family Goals   Patient / Family Goal #1 home when safe and mobile   Goal #1 Outcome Goal not met   Short Term Goals   Short Term Goal # 1 Pt will get up to EOB for ADL's with Becky in 3 visits   Goal Outcome # 1 Progressing slower than expected   Short Term Goal # 2 Pt will don TLSO with Becky in 4 visits   Goal Outcome # 2 Progressing slower than expected   Short Term Goal # 3 Pt will walk to BR with FWW and Becky in 4 visits   Goal Outcome # 3 Progressing slower than expected   Short Term Goal # 4 Pt will toilet Becky in 4 visits   Goal Outcome # 4 Progressing slower than expected   Short Term Goal # 5 Pt will stand 8 min at sink to groom supervised in 4 visits   Goal Outcome # 5 Progressing slower than expected   Education Group   Role of Occupational Therapist Patient Response Patient;Family;Acceptance;Explanation;Verbal Demonstration   Back Safety Patient Response Patient;Family;Acceptance;Explanation;Verbal Demonstration   Brace Wear & Care Patient Response Patient;Family;Acceptance;Explanation;Demonstration;Reinforcement Needed   ADL Patient Response Patient;Family;Acceptance;Explanation;Reinforcement Needed   Pathology of Bedrest Patient Response  Patient;Family;Acceptance;Explanation;Verbal Demonstration   Anticipated Discharge Equipment and Recommendations   DC Equipment Recommendations Unable to determine at this time   Discharge Recommendations Recommend post-acute placement for additional occupational therapy services prior to discharge home   Interdisciplinary Plan of Care Collaboration   IDT Collaboration with  Nursing;Certified Nursing Assistant;Family / Caregiver   Patient Position at End of Therapy Seated;Call Light within Reach;Tray Table within Reach;Phone within Reach;Family / Friend in Room   Collaboration Comments OT tx and recs   Session Information   Date / Session Number  11/26 3 (3/3, 11/29)   Priority 2

## 2021-11-26 NOTE — ASSESSMENT & PLAN NOTE
- Previously had NG tube for decompression, Relistor, bowel regimen etc.  Was sent to the ICU for Neostigmine x 1 11/27 with subsequent bowel movements and radiographic resolution  - OOB  - Avoid narcotics - utilizing Skelaxin and lidoderm for MSK pain along with PRN Tylenol  - Secondary to lumbar compression fracture and narcotics  - GI on board  - BID Miralax  - Transitioning to GI soft diet today, SNF in the am if she tolerates

## 2021-11-26 NOTE — PROGRESS NOTES
Assumed care of pt at 1915. Received report from Radha HATCH. Pt resting in bed. Pain in back is stated at a 2 on the 0 to 10 scale. No complaints of abdominal pain or nausea at this time. Pt drank all of boost for dinner but no other items on her tray. Pt abdomen is distended this evening. Distant BS present in all quadrants with RLQ hypoactive. Discussed inserting a suppository this evening. Pt stated she would like to try and sleep tonight and would like the suppository in the am with her morning medications.  Pt encouraged to increase mobility and pivot to commode. Pt verbalized agreement. Pt declining to wear brace at this time. Educated pt on importance of wearing brace but pt still declining at this time when pivoting to commode. No other needs at this time. Call light in reach, bed in lowest position.

## 2021-11-26 NOTE — PROGRESS NOTES
Patient is not tolerating clear liquid diet; she has not eaten anything the entire shift. She drank some prune juice and vomited after. This RN voiced concerns to the physician for giving the patient PO medication, including the Metformin in lieu of no PO intake . Physician advised to give suppository, keep on clear liquid diet, and to hold PO meds if concerns persist. This RN holding 1700/1800 medications and patient is agreeable to a suppository. Will administer per MAR and continue to monitor.

## 2021-11-26 NOTE — PROGRESS NOTES
[facility] Medicine Daily Progress Note    Date of Service  11/26/2021    Chief Complaint  Stacy Kelley is a 76 y.o. female admitted 11/21/2021 with low back pain secondary to ground-level fall    Hospital Course  Stacy Kelley is a 76 y.o. female who lives in [place] with her  who himself has multiple medical problems.  Patient apparently fell on Tuesday of last week and did not tell the family.  The pain persisted and finally the daughter recognized the situation and brought her to [place] for evaluation.  Here then she took her to urgent care where she was evaluated imaging studies and recommendation was to get her an MRI.  They were able to complete the MRI 11/21 and this shows an acute L1 compression fracture thus she was brought to the hospital for continued management and evaluation.  Subsequently was found that the patient was also hyponatremic on admision.      The patient was noted to have nausea, vomiting. CT abd noted Dilated small and large bowel loops with no findings consistent with mechanical obstruction identified. NG tube was placed. Patient had bowel movements 11/23. NG tube output decreased. NG discontinued on 11/24. Still distended, repeat KUB showing dilated colon, right colon dilated up to 15cm. GI consulted, possible rectal decompression tube.     Interval Problem Update  Abdomen still very distended. Patient did have bowel movement this am.  Able to take clear liquid, without nausea and vomiting.   GI consulted, possible rectal decompression tube.   K, phos and Mag low, replacing.   IVF  OOB  I have personally seen and examined the patient at bedside. I discussed the plan of care with patient, family, bedside RN, charge RN,  and pharmacy.    Consultants/Specialty  GI    Code Status  Full Code    Disposition  Patient is not medically cleared.   Anticipate discharge to to skilled nursing facility.  I have placed the appropriate orders for post-discharge  needs.    Review of Systems  Review of Systems   Constitutional: Positive for malaise/fatigue. Negative for chills, diaphoresis and fever.   HENT: Negative.    Eyes: Negative.  Negative for double vision.   Respiratory: Negative.  Negative for cough, hemoptysis and wheezing.    Cardiovascular: Negative.  Negative for chest pain, palpitations and leg swelling.   Gastrointestinal: Positive for abdominal pain (improving) and constipation. Negative for blood in stool, diarrhea, heartburn, nausea and vomiting.   Genitourinary: Negative.  Negative for frequency, hematuria and urgency.   Musculoskeletal: Positive for back pain, myalgias and neck pain. Negative for joint pain.   Skin: Negative.  Negative for itching and rash.   Neurological: Negative.  Negative for dizziness, focal weakness, seizures, loss of consciousness and headaches.   Endo/Heme/Allergies: Negative.  Does not bruise/bleed easily.   Psychiatric/Behavioral: Positive for memory loss. Negative for depression, substance abuse and suicidal ideas. The patient is not nervous/anxious.    All other systems reviewed and are negative.       Physical Exam  Temp:  [36.6 °C (97.8 °F)-37.3 °C (99.1 °F)] 36.6 °C (97.8 °F)  Pulse:  [] 113  Resp:  [17-19] 17  BP: (133-156)/(73-94) 149/73  SpO2:  [90 %-95 %] 94 %    Physical Exam  Vitals and nursing note reviewed. Exam conducted with a chaperone present.   Constitutional:       General: She is awake.      Appearance: Normal appearance. She is well-developed, well-groomed and normal weight.   HENT:      Head: Normocephalic and atraumatic.      Jaw: There is normal jaw occlusion. No trismus.      Salivary Glands: Right salivary gland is not tender. Left salivary gland is not tender.      Right Ear: External ear normal.      Left Ear: External ear normal.      Mouth/Throat:      Mouth: Mucous membranes are dry.      Pharynx: Oropharynx is clear.   Eyes:      General: Lids are normal. Vision grossly intact.       Extraocular Movements: Extraocular movements intact.      Conjunctiva/sclera: Conjunctivae normal.      Right eye: Right conjunctiva is not injected. No exudate.     Left eye: Left conjunctiva is not injected. No exudate.     Pupils: Pupils are equal, round, and reactive to light.   Neck:      Thyroid: No thyroid mass.      Vascular: No hepatojugular reflux or JVD.      Trachea: No abnormal tracheal secretions or tracheal deviation.   Cardiovascular:      Rate and Rhythm: Normal rate and regular rhythm. Occasional extrasystoles are present.     Pulses: Normal pulses.      Heart sounds: Normal heart sounds. No murmur heard.  No friction rub.   Pulmonary:      Effort: Pulmonary effort is normal.      Breath sounds: Examination of the right-lower field reveals decreased breath sounds. Examination of the left-lower field reveals decreased breath sounds. Decreased breath sounds present. No wheezing or rhonchi.   Abdominal:      General: There is distension.      Palpations: Abdomen is soft.      Tenderness: There is no abdominal tenderness (improving). There is no right CVA tenderness or left CVA tenderness.      Hernia: No hernia is present.      Comments: Bowel sounds decreased   Genitourinary:     General: Normal vulva.   Musculoskeletal:      Cervical back: Full passive range of motion without pain, normal range of motion and neck supple. No rigidity. No muscular tenderness.      Lumbar back: Tenderness present. Decreased range of motion.      Right lower leg: No edema.      Left lower leg: No edema.   Skin:     General: Skin is warm and dry.      Capillary Refill: Capillary refill takes less than 2 seconds.      Coloration: Skin is not cyanotic or pale.      Findings: No abrasion or bruising.   Neurological:      General: No focal deficit present.      Mental Status: She is alert and oriented to person, place, and time. Mental status is at baseline.      Cranial Nerves: No cranial nerve deficit.      Sensory: No  sensory deficit.      Motor: Motor function is intact.   Psychiatric:         Attention and Perception: Attention and perception normal.         Mood and Affect: Mood normal.         Speech: Speech normal.         Behavior: Behavior normal. Behavior is cooperative.         Thought Content: Thought content normal.         Cognition and Memory: Cognition and memory normal.         Judgment: Judgment normal.         Fluids    Intake/Output Summary (Last 24 hours) at 11/26/2021 1054  Last data filed at 11/26/2021 0600  Gross per 24 hour   Intake --   Output 250 ml   Net -250 ml       Laboratory  Recent Labs     11/24/21 0427 11/25/21 0142 11/26/21  0206   WBC 9.7 8.8 7.9   RBC 4.35 4.36 4.40   HEMOGLOBIN 13.9 14.1 14.2   HEMATOCRIT 40.6 42.4 40.7   MCV 93.3 97.2 92.5   MCH 32.0 32.3 32.3   MCHC 34.2 33.3* 34.9   RDW 40.9 43.1 40.3   PLATELETCT 458* 454* 534*   MPV 8.7* 9.0 9.3     Recent Labs     11/24/21 0427 11/25/21 0142 11/26/21  0206   SODIUM 132* 128* 131*   POTASSIUM 3.1* 3.4* 3.1*   CHLORIDE 97 93* 94*   CO2 21 20 24   GLUCOSE 149* 165* 178*   BUN 17 15 17   CREATININE 0.38* 0.32* 0.35*   CALCIUM 8.3* 8.8 9.4                   Imaging  DX-CHEST-PORTABLE (1 VIEW)   Final Result      No acute cardiac or pulmonary abnormalities are identified.      [id number] VIEW   Final Result      Persistent dilation of bowel predominately effecting the colon extending down to the rectum likely due to dysmotility or Taryn syndrome.      Right colon again dilated up to 15 cm.      [id number] VIEW   Final Result      1.  Again seen dilated small and large intestine.      2.  NG tube tip located just below the gastroesophageal junction.      DX-ABDOMEN FOR TUBE PLACEMENT   Final Result      Enteric tube tip projects over the stomach.      US-RUQ   Final Result      1.  Sludge and gallstones within the gallbladder. No evidence of biliary ductal dilatation.      2.  The liver is echogenic consistent with fatty change versus  hepatocellular dysfunction.      DX-ABDOMEN FOR TUBE PLACEMENT   Final Result      Interval advancement of a nasogastric tube with the tip now overlying the expected location of the gastric body.      DX-ABDOMEN FOR TUBE PLACEMENT   Final Result      NG tube tip located at or just below the gastroesophageal junction. Advancement is recommended.      The report was called to the patient's nurse at 12:13 PM on 11/22/2021      CT-ABDOMEN-PELVIS W/O   Final Result      1.  Dilated small and large bowel loops with no findings consistent with mechanical obstruction identified.      2.  Diverticulosis of the distal colon without evidence of diverticulitis.      3.  Right lower quadrant mesenteric fat stranding which could indicate mild inflammatory changes nonspecific. No bowel wall thickening. No free fluid. No free air.      4.  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      5.  Punctate nonobstructing right renal stones. No hydronephrosis.      6.  Cholelithiasis.      7.  Hepatic steatosis.      8.  Bilateral lung base atelectasis/mild bronchiectasis. Trace right basilar pleural effusion.      DX-CHEST-PORTABLE (1 VIEW)   Final Result      No acute cardiopulmonary abnormality identified.      DS-BONE DENSITY STUDY (DEXA)    (Results Pending)        Assessment/Plan  * Acute midline low back pain without sciatica  Assessment & Plan  Low midline back pain is secondary to L1 compression  Continue with multimodal pain managements, lidocaine patch.     Taryn syndrome  Assessment & Plan  Persistently dilated bowel. CT and KUB c/w [place] syndrome. She did have bowel movements. NG tube was discontinued on 11/24.   GI consulted 11/26, possible rectal decompression tube  OOB  Avoid narcotics  Optimization of electrolytes      Fall  Assessment & Plan  PT/OT  Fall precautions    Hypomagnesemia  Assessment & Plan  Replace as indicated    Hypophosphatemia  Assessment & Plan  Replace as  indicated    Hypokalemia  Assessment & Plan  Replace as indicated    Leukocytosis  Assessment & Plan  Secondary to bowel obstruction patient has leukocytosis  Continue monitor white blood cell count, improving  Resolved    Cancer (HCC)  Assessment & Plan  Patient reports history of right-sided breast cancer status post mastectomy and this is in remission.  Radiation had diagnosis initially 18 years ago first and then did have a recurrence and now she is status post radiation and surgical intervention      Hypertension  Assessment & Plan  Monitor blood pressure and adjust medications to keep systolic blood pressure less than 140 diastolic under 90.  Continue with Norvasc 5 mg daily  Continue with metoprolol XL 25 mg daily      Closed compression fracture of body of L1 vertebra (HCC)  Assessment & Plan  Need outpatient referral  Pain management  PT/OT rec SNF placement, referred    Hyponatremia  Assessment & Plan  Initial sodium level is down secondary to dehydration  Improving with IVF      CN (constipation)  Assessment & Plan  Patient has chronic constipation and per the daughter has not had a bowel movement in at least 4 days but possibly longer as she just picked her up 4 days ago from [place].   Given suppository 11/23 and had bowel movements         VTE prophylaxis: enoxaparin ppx    I have performed a physical exam and reviewed and updated ROS and Plan today (11/26/2021). In review of yesterday's note (11/25/2021), there are no changes except as documented above.

## 2021-11-26 NOTE — PROGRESS NOTES
Rectal tube inserted, 45ml of sterile water inserted into balloon as recommended by system. Patient tolerated well. Rectal tube flushed with minimal stool output at this time.

## 2021-11-26 NOTE — PROGRESS NOTES
Assumed care of patient. Bedside report received from night shift RN. Patient refused suppository last night, but agreeable this morning. Patient up to bedside commode and able to have a bowel movement, per patient and CNA. Patient back to bed.    Call light is within reach and fall precautions are in place. All needs met at this time. Hourly rounding in place.

## 2021-11-26 NOTE — CARE PLAN
The patient is Watcher - Medium risk of patient condition declining or worsening    Shift Goals  Clinical Goals: Bowel movement  Patient Goals: Pain control  Family Goals: Up for meals    Progress made toward(s) clinical / shift goals:  Patient has minimal c/o pain. Medicated per MAR in afternoon. Patient has had a tolerable level of pain since.    Patient is not progressing towards the following goals: Patient declines to get up for meals, per family goals. Patient does not wish to wear the brace when up; severe distension is problematic. Patient has yet to have a bowel movement.       Problem: Bowel Elimination  Goal: Establish and maintain regular bowel function  Outcome: Not Progressing   No bowel movement. Will administer suppository per MAR.    Problem: Gastrointestinal Irritability  Goal: Nausea and vomiting will be absent or improve  Outcome: Not Progressing  This RN voiced concerns for continuing PO intake for this patient. Patient has not tolerated any PO intake this shift. Her sole attempt at PO intake was prune juice, which she drank and vomited. This RN ok to hold PO meds per Dr. Fernandez.

## 2021-11-27 ENCOUNTER — APPOINTMENT (OUTPATIENT)
Dept: RADIOLOGY | Facility: MEDICAL CENTER | Age: 76
DRG: 552 | End: 2021-11-27
Attending: STUDENT IN AN ORGANIZED HEALTH CARE EDUCATION/TRAINING PROGRAM
Payer: MEDICARE

## 2021-11-27 ENCOUNTER — APPOINTMENT (OUTPATIENT)
Dept: RADIOLOGY | Facility: MEDICAL CENTER | Age: 76
DRG: 552 | End: 2021-11-27
Attending: NURSE PRACTITIONER
Payer: MEDICARE

## 2021-11-27 LAB
ANION GAP SERPL CALC-SCNC: 14 MMOL/L (ref 7–16)
BASOPHILS # BLD AUTO: 0.6 % (ref 0–1.8)
BASOPHILS # BLD: 0.05 K/UL (ref 0–0.12)
BUN SERPL-MCNC: 30 MG/DL (ref 8–22)
CALCIUM SERPL-MCNC: 8.4 MG/DL (ref 8.4–10.2)
CHLORIDE SERPL-SCNC: 95 MMOL/L (ref 96–112)
CO2 SERPL-SCNC: 22 MMOL/L (ref 20–33)
CREAT SERPL-MCNC: 0.41 MG/DL (ref 0.5–1.4)
EOSINOPHIL # BLD AUTO: 0.16 K/UL (ref 0–0.51)
EOSINOPHIL NFR BLD: 1.8 % (ref 0–6.9)
ERYTHROCYTE [DISTWIDTH] IN BLOOD BY AUTOMATED COUNT: 40.3 FL (ref 35.9–50)
GLUCOSE SERPL-MCNC: 173 MG/DL (ref 65–99)
HCT VFR BLD AUTO: 35.3 % (ref 37–47)
HGB BLD-MCNC: 11.9 G/DL (ref 12–16)
IMM GRANULOCYTES # BLD AUTO: 0.1 K/UL (ref 0–0.11)
IMM GRANULOCYTES NFR BLD AUTO: 1.1 % (ref 0–0.9)
LYMPHOCYTES # BLD AUTO: 2.3 K/UL (ref 1–4.8)
LYMPHOCYTES NFR BLD: 26.3 % (ref 22–41)
MAGNESIUM SERPL-MCNC: 1.5 MG/DL (ref 1.5–2.5)
MCH RBC QN AUTO: 31.2 PG (ref 27–33)
MCHC RBC AUTO-ENTMCNC: 33.7 G/DL (ref 33.6–35)
MCV RBC AUTO: 92.7 FL (ref 81.4–97.8)
MONOCYTES # BLD AUTO: 0.91 K/UL (ref 0–0.85)
MONOCYTES NFR BLD AUTO: 10.4 % (ref 0–13.4)
NEUTROPHILS # BLD AUTO: 5.23 K/UL (ref 2–7.15)
NEUTROPHILS NFR BLD: 59.8 % (ref 44–72)
NRBC # BLD AUTO: 0 K/UL
NRBC BLD-RTO: 0 /100 WBC
PHOSPHATE SERPL-MCNC: 2.5 MG/DL (ref 2.5–4.5)
PLATELET # BLD AUTO: 471 K/UL (ref 164–446)
PMV BLD AUTO: 9.1 FL (ref 9–12.9)
POTASSIUM SERPL-SCNC: 3.3 MMOL/L (ref 3.6–5.5)
RBC # BLD AUTO: 3.81 M/UL (ref 4.2–5.4)
SODIUM SERPL-SCNC: 131 MMOL/L (ref 135–145)
WBC # BLD AUTO: 8.8 K/UL (ref 4.8–10.8)

## 2021-11-27 PROCEDURE — 700111 HCHG RX REV CODE 636 W/ 250 OVERRIDE (IP): Performed by: INTERNAL MEDICINE

## 2021-11-27 PROCEDURE — 700111 HCHG RX REV CODE 636 W/ 250 OVERRIDE (IP): Performed by: STUDENT IN AN ORGANIZED HEALTH CARE EDUCATION/TRAINING PROGRAM

## 2021-11-27 PROCEDURE — 97116 GAIT TRAINING THERAPY: CPT

## 2021-11-27 PROCEDURE — 700105 HCHG RX REV CODE 258: Performed by: STUDENT IN AN ORGANIZED HEALTH CARE EDUCATION/TRAINING PROGRAM

## 2021-11-27 PROCEDURE — 85025 COMPLETE CBC W/AUTO DIFF WBC: CPT

## 2021-11-27 PROCEDURE — 99233 SBSQ HOSP IP/OBS HIGH 50: CPT | Performed by: STUDENT IN AN ORGANIZED HEALTH CARE EDUCATION/TRAINING PROGRAM

## 2021-11-27 PROCEDURE — 99291 CRITICAL CARE FIRST HOUR: CPT | Performed by: STUDENT IN AN ORGANIZED HEALTH CARE EDUCATION/TRAINING PROGRAM

## 2021-11-27 PROCEDURE — 74018 RADEX ABDOMEN 1 VIEW: CPT

## 2021-11-27 PROCEDURE — 83735 ASSAY OF MAGNESIUM: CPT

## 2021-11-27 PROCEDURE — 700102 HCHG RX REV CODE 250 W/ 637 OVERRIDE(OP): Performed by: STUDENT IN AN ORGANIZED HEALTH CARE EDUCATION/TRAINING PROGRAM

## 2021-11-27 PROCEDURE — 80048 BASIC METABOLIC PNL TOTAL CA: CPT

## 2021-11-27 PROCEDURE — 700102 HCHG RX REV CODE 250 W/ 637 OVERRIDE(OP): Performed by: INTERNAL MEDICINE

## 2021-11-27 PROCEDURE — 97530 THERAPEUTIC ACTIVITIES: CPT

## 2021-11-27 PROCEDURE — 700101 HCHG RX REV CODE 250: Performed by: STUDENT IN AN ORGANIZED HEALTH CARE EDUCATION/TRAINING PROGRAM

## 2021-11-27 PROCEDURE — A9270 NON-COVERED ITEM OR SERVICE: HCPCS | Performed by: INTERNAL MEDICINE

## 2021-11-27 PROCEDURE — A9270 NON-COVERED ITEM OR SERVICE: HCPCS | Performed by: STUDENT IN AN ORGANIZED HEALTH CARE EDUCATION/TRAINING PROGRAM

## 2021-11-27 PROCEDURE — 770020 HCHG ROOM/CARE - TELE (206)

## 2021-11-27 PROCEDURE — 84100 ASSAY OF PHOSPHORUS: CPT

## 2021-11-27 RX ORDER — MAGNESIUM SULFATE HEPTAHYDRATE 40 MG/ML
2 INJECTION, SOLUTION INTRAVENOUS ONCE
Status: COMPLETED | OUTPATIENT
Start: 2021-11-27 | End: 2021-11-27

## 2021-11-27 RX ORDER — POTASSIUM CHLORIDE 7.45 MG/ML
10 INJECTION INTRAVENOUS
Status: COMPLETED | OUTPATIENT
Start: 2021-11-27 | End: 2021-11-27

## 2021-11-27 RX ORDER — NEOSTIGMINE METHYLSULFATE 1 MG/ML
2 INJECTION, SOLUTION INTRAVENOUS ONCE
Status: COMPLETED | OUTPATIENT
Start: 2021-11-27 | End: 2021-11-27

## 2021-11-27 RX ORDER — IPRATROPIUM BROMIDE AND ALBUTEROL SULFATE 2.5; .5 MG/3ML; MG/3ML
3 SOLUTION RESPIRATORY (INHALATION)
Status: DISCONTINUED | OUTPATIENT
Start: 2021-11-27 | End: 2021-11-27

## 2021-11-27 RX ORDER — NEOSTIGMINE METHYLSULFATE 1 MG/ML
2 INJECTION, SOLUTION INTRAVENOUS ONCE
Status: DISCONTINUED | OUTPATIENT
Start: 2021-11-27 | End: 2021-11-27

## 2021-11-27 RX ORDER — EPINEPHRINE 0.1 MG/ML
1 SYRINGE (ML) INJECTION
Status: DISCONTINUED | OUTPATIENT
Start: 2021-11-27 | End: 2021-11-27

## 2021-11-27 RX ORDER — EPINEPHRINE 0.1 MG/ML
1 SYRINGE (ML) INJECTION ONCE
Status: DISCONTINUED | OUTPATIENT
Start: 2021-11-27 | End: 2021-11-27

## 2021-11-27 RX ADMIN — POTASSIUM CHLORIDE 10 MEQ: 10 INJECTION, SOLUTION INTRAVENOUS at 12:35

## 2021-11-27 RX ADMIN — ENOXAPARIN SODIUM 40 MG: 40 INJECTION SUBCUTANEOUS at 05:38

## 2021-11-27 RX ADMIN — POTASSIUM CHLORIDE 10 MEQ: 10 INJECTION, SOLUTION INTRAVENOUS at 10:12

## 2021-11-27 RX ADMIN — POTASSIUM CHLORIDE 10 MEQ: 10 INJECTION, SOLUTION INTRAVENOUS at 09:06

## 2021-11-27 RX ADMIN — POTASSIUM CHLORIDE 10 MEQ: 10 INJECTION, SOLUTION INTRAVENOUS at 11:18

## 2021-11-27 RX ADMIN — LIDOCAINE 1 PATCH: 50 PATCH TOPICAL at 12:35

## 2021-11-27 RX ADMIN — TRAMADOL HYDROCHLORIDE 50 MG: 50 TABLET, FILM COATED ORAL at 17:40

## 2021-11-27 RX ADMIN — POTASSIUM CHLORIDE: 2 INJECTION, SOLUTION, CONCENTRATE INTRAVENOUS at 14:11

## 2021-11-27 RX ADMIN — NEOSTIGMINE METHYLSULFATE 2 MG: 1 INJECTION INTRAVENOUS at 16:47

## 2021-11-27 RX ADMIN — MAGNESIUM SULFATE HEPTAHYDRATE 2 G: 2 INJECTION, SOLUTION INTRAVENOUS at 10:03

## 2021-11-27 RX ADMIN — METAXALONE 800 MG: 800 TABLET ORAL at 17:32

## 2021-11-27 RX ADMIN — METOPROLOL SUCCINATE 25 MG: 25 TABLET, EXTENDED RELEASE ORAL at 05:39

## 2021-11-27 RX ADMIN — METFORMIN HYDROCHLORIDE 500 MG: 500 TABLET, EXTENDED RELEASE ORAL at 17:32

## 2021-11-27 RX ADMIN — AMLODIPINE BESYLATE 5 MG: 5 TABLET ORAL at 20:14

## 2021-11-27 RX ADMIN — METAXALONE 800 MG: 800 TABLET ORAL at 12:35

## 2021-11-27 RX ADMIN — METAXALONE 800 MG: 800 TABLET ORAL at 05:38

## 2021-11-27 RX ADMIN — METFORMIN HYDROCHLORIDE 500 MG: 500 TABLET, EXTENDED RELEASE ORAL at 07:53

## 2021-11-27 ASSESSMENT — LIFESTYLE VARIABLES
EVER HAD A DRINK FIRST THING IN THE MORNING TO STEADY YOUR NERVES TO GET RID OF A HANGOVER: NO
ALCOHOL_USE: NO
TOTAL SCORE: 0
ON A TYPICAL DAY WHEN YOU DRINK ALCOHOL HOW MANY DRINKS DO YOU HAVE: 0
AVERAGE NUMBER OF DAYS PER WEEK YOU HAVE A DRINK CONTAINING ALCOHOL: 0
TOTAL SCORE: 0
HOW MANY TIMES IN THE PAST YEAR HAVE YOU HAD 5 OR MORE DRINKS IN A DAY: 0
EVER FELT BAD OR GUILTY ABOUT YOUR DRINKING: NO
TOTAL SCORE: 0
CONSUMPTION TOTAL: NEGATIVE
HAVE PEOPLE ANNOYED YOU BY CRITICIZING YOUR DRINKING: NO
HAVE YOU EVER FELT YOU SHOULD CUT DOWN ON YOUR DRINKING: NO
SUBSTANCE_ABUSE: 0

## 2021-11-27 ASSESSMENT — ENCOUNTER SYMPTOMS
MEMORY LOSS: 1
BRUISES/BLEEDS EASILY: 0
FOCAL WEAKNESS: 0
MYALGIAS: 1
NAUSEA: 0
NECK PAIN: 1
DEPRESSION: 0
DIZZINESS: 0
SHORTNESS OF BREATH: 1
HEARTBURN: 0
WHEEZING: 0
NEUROLOGICAL NEGATIVE: 1
SHORTNESS OF BREATH: 0
HEADACHES: 0
CONSTIPATION: 1
SEIZURES: 0
DIARRHEA: 0
LOSS OF CONSCIOUSNESS: 0
EYES NEGATIVE: 1
BLOOD IN STOOL: 0
RESPIRATORY NEGATIVE: 1
ABDOMINAL PAIN: 0
BACK PAIN: 1
CARDIOVASCULAR NEGATIVE: 1
CHILLS: 0
DIAPHORESIS: 0
NERVOUS/ANXIOUS: 0
COUGH: 0
VOMITING: 0
DOUBLE VISION: 0
PALPITATIONS: 0
FEVER: 0
HEMOPTYSIS: 0
ABDOMINAL PAIN: 1

## 2021-11-27 ASSESSMENT — GAIT ASSESSMENTS
ASSISTIVE DEVICE: FRONT WHEEL WALKER
GAIT LEVEL OF ASSIST: SUPERVISED
DEVIATION: STEP TO
DISTANCE (FEET): 10

## 2021-11-27 ASSESSMENT — FIBROSIS 4 INDEX: FIB4 SCORE: 0.83

## 2021-11-27 ASSESSMENT — VISUAL ACUITY: OU: 1

## 2021-11-27 ASSESSMENT — PAIN DESCRIPTION - PAIN TYPE
TYPE: ACUTE PAIN
TYPE: ACUTE PAIN

## 2021-11-27 ASSESSMENT — PATIENT HEALTH QUESTIONNAIRE - PHQ9
1. LITTLE INTEREST OR PLEASURE IN DOING THINGS: NOT AT ALL
SUM OF ALL RESPONSES TO PHQ9 QUESTIONS 1 AND 2: 0
2. FEELING DOWN, DEPRESSED, IRRITABLE, OR HOPELESS: NOT AT ALL

## 2021-11-27 NOTE — PROGRESS NOTES
[facility] Medicine Daily Progress Note    Date of Service  11/27/2021    Chief Complaint  Stacy Kelley is a 76 y.o. female admitted 11/21/2021 with low back pain secondary to ground-level fall    Hospital Course  Stacy Kelley is a 76 y.o. female who lives in [place] with her  who himself has multiple medical problems.  Patient apparently fell on Tuesday of last week and did not tell the family.  The pain persisted and finally the daughter recognized the situation and brought her to [place] for evaluation.  Here then she took her to urgent care where she was evaluated imaging studies and recommendation was to get her an MRI.  They were able to complete the MRI 11/21 and this shows an acute L1 compression fracture thus she was brought to the hospital for continued management and evaluation.  Subsequently was found that the patient was also hyponatremic on admision.      The patient was noted to have nausea, vomiting. CT abd noted Dilated small and large bowel loops with no findings consistent with mechanical obstruction identified. NG tube was placed. Patient had bowel movements 11/23. NG tube output decreased. NG discontinued on 11/24. Still distended, repeat KUB showing dilated colon, right colon dilated up to 15cm. GI consulted, rectal tube placed.      Interval Problem Update  Patient's abdomen is softer and less distended this am  Repeated KUB the right colon measures up to 12.4 cm, slightly improving from 15cm. I discussed the case with GI Dr. Bender, will repeat KUB this pm, if still >12cm, will transfer patient to ICU for neostigmine.   OOB  I have personally seen and examined the patient at bedside. I discussed the plan of care with patient, family, bedside RN, charge RN,  and pharmacy.    Consultants/Specialty  GI    Code Status  Full Code    Disposition  Patient is not medically cleared.   Anticipate discharge to to skilled nursing facility.  I have placed the appropriate orders  for post-discharge needs.    Review of Systems  Review of Systems   Constitutional: Positive for malaise/fatigue. Negative for chills, diaphoresis and fever.   HENT: Negative.    Eyes: Negative.  Negative for double vision.   Respiratory: Negative.  Negative for cough, hemoptysis and wheezing.    Cardiovascular: Negative.  Negative for chest pain, palpitations and leg swelling.   Gastrointestinal: Positive for abdominal pain (improving) and constipation. Negative for blood in stool, diarrhea, heartburn, nausea and vomiting.   Genitourinary: Negative.  Negative for frequency, hematuria and urgency.   Musculoskeletal: Positive for back pain, myalgias and neck pain. Negative for joint pain.   Skin: Negative.  Negative for itching and rash.   Neurological: Negative.  Negative for dizziness, focal weakness, seizures, loss of consciousness and headaches.   Endo/Heme/Allergies: Negative.  Does not bruise/bleed easily.   Psychiatric/Behavioral: Positive for memory loss. Negative for depression, substance abuse and suicidal ideas. The patient is not nervous/anxious.    All other systems reviewed and are negative.       Physical Exam  Temp:  [36.4 °C (97.6 °F)-36.7 °C (98 °F)] 36.5 °C (97.7 °F)  Pulse:  [100-107] 100  Resp:  [16] 16  BP: (109-152)/(55-73) 109/56  SpO2:  [91 %-94 %] 94 %    Physical Exam  Vitals and nursing note reviewed. Exam conducted with a chaperone present.   Constitutional:       General: She is awake.      Appearance: Normal appearance. She is well-developed, well-groomed and normal weight.   HENT:      Head: Normocephalic and atraumatic.      Jaw: There is normal jaw occlusion. No trismus.      Salivary Glands: Right salivary gland is not tender. Left salivary gland is not tender.      Right Ear: External ear normal.      Left Ear: External ear normal.      Mouth/Throat:      Mouth: Mucous membranes are dry.      Pharynx: Oropharynx is clear.   Eyes:      General: Lids are normal. Vision grossly  intact.      Extraocular Movements: Extraocular movements intact.      Conjunctiva/sclera: Conjunctivae normal.      Right eye: Right conjunctiva is not injected. No exudate.     Left eye: Left conjunctiva is not injected. No exudate.     Pupils: Pupils are equal, round, and reactive to light.   Neck:      Thyroid: No thyroid mass.      Vascular: No hepatojugular reflux or JVD.      Trachea: No abnormal tracheal secretions or tracheal deviation.   Cardiovascular:      Rate and Rhythm: Normal rate and regular rhythm. Occasional extrasystoles are present.     Pulses: Normal pulses.      Heart sounds: Normal heart sounds. No murmur heard.  No friction rub.   Pulmonary:      Effort: Pulmonary effort is normal.      Breath sounds: Examination of the right-lower field reveals decreased breath sounds. Examination of the left-lower field reveals decreased breath sounds. Decreased breath sounds present. No wheezing or rhonchi.   Abdominal:      General: There is distension.      Palpations: Abdomen is soft.      Tenderness: There is no abdominal tenderness (improving). There is no right CVA tenderness or left CVA tenderness.      Hernia: No hernia is present.      Comments: Bowel sounds decreased  On rectal tube   Genitourinary:     General: Normal vulva.   Musculoskeletal:      Cervical back: Full passive range of motion without pain, normal range of motion and neck supple. No rigidity. No muscular tenderness.      Lumbar back: Tenderness present. Decreased range of motion.      Right lower leg: No edema.      Left lower leg: No edema.   Skin:     General: Skin is warm and dry.      Capillary Refill: Capillary refill takes less than 2 seconds.      Coloration: Skin is not cyanotic or pale.      Findings: No abrasion or bruising.   Neurological:      General: No focal deficit present.      Mental Status: She is alert and oriented to person, place, and time. Mental status is at baseline.      Cranial Nerves: No cranial nerve  deficit.      Sensory: No sensory deficit.      Motor: Motor function is intact.   Psychiatric:         Attention and Perception: Attention and perception normal.         Mood and Affect: Mood normal.         Speech: Speech normal.         Behavior: Behavior normal. Behavior is cooperative.         Thought Content: Thought content normal.         Cognition and Memory: Cognition and memory normal.         Judgment: Judgment normal.         Fluids    Intake/Output Summary (Last 24 hours) at 11/27/2021 1111  Last data filed at 11/27/2021 0500  Gross per 24 hour   Intake --   Output 30 ml   Net -30 ml       Laboratory  Recent Labs     11/25/21  0142 11/26/21  0206 11/27/21  0417   WBC 8.8 7.9 8.8   RBC 4.36 4.40 3.81*   HEMOGLOBIN 14.1 14.2 11.9*   HEMATOCRIT 42.4 40.7 35.3*   MCV 97.2 92.5 92.7   MCH 32.3 32.3 31.2   MCHC 33.3* 34.9 33.7   RDW 43.1 40.3 40.3   PLATELETCT 454* 534* 471*   MPV 9.0 9.3 9.1     Recent Labs     11/25/21  0142 11/26/21  0206 11/27/21  0417   SODIUM 128* 131* 131*   POTASSIUM 3.4* 3.1* 3.3*   CHLORIDE 93* 94* 95*   CO2 20 24 22   GLUCOSE 165* 178* 173*   BUN 15 17 30*   CREATININE 0.32* 0.35* 0.41*   CALCIUM 8.8 9.4 8.4                   Imaging  [id number] VIEW   Final Result      Gaseous colonic distention appears mildly improved.      DX-CHEST-PORTABLE (1 VIEW)   Final Result      No acute cardiac or pulmonary abnormalities are identified.      [id number] VIEW   Final Result      Persistent dilation of bowel predominately effecting the colon extending down to the rectum likely due to dysmotility or Taryn syndrome.      Right colon again dilated up to 15 cm.      [id number] VIEW   Final Result      1.  Again seen dilated small and large intestine.      2.  NG tube tip located just below the gastroesophageal junction.      DX-ABDOMEN FOR TUBE PLACEMENT   Final Result      Enteric tube tip projects over the stomach.      US-RUQ   Final Result      1.  Sludge and gallstones within the  gallbladder. No evidence of biliary ductal dilatation.      2.  The liver is echogenic consistent with fatty change versus hepatocellular dysfunction.      DX-ABDOMEN FOR TUBE PLACEMENT   Final Result      Interval advancement of a nasogastric tube with the tip now overlying the expected location of the gastric body.      DX-ABDOMEN FOR TUBE PLACEMENT   Final Result      NG tube tip located at or just below the gastroesophageal junction. Advancement is recommended.      The report was called to the patient's nurse at 12:13 PM on 11/22/2021      CT-ABDOMEN-PELVIS W/O   Final Result      1.  Dilated small and large bowel loops with no findings consistent with mechanical obstruction identified.      2.  Diverticulosis of the distal colon without evidence of diverticulitis.      3.  Right lower quadrant mesenteric fat stranding which could indicate mild inflammatory changes nonspecific. No bowel wall thickening. No free fluid. No free air.      4.  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      5.  Punctate nonobstructing right renal stones. No hydronephrosis.      6.  Cholelithiasis.      7.  Hepatic steatosis.      8.  Bilateral lung base atelectasis/mild bronchiectasis. Trace right basilar pleural effusion.      DX-CHEST-PORTABLE (1 VIEW)   Final Result      No acute cardiopulmonary abnormality identified.      DS-BONE DENSITY STUDY (DEXA)    (Results Pending)        Assessment/Plan  * Acute midline low back pain without sciatica  Assessment & Plan  Low midline back pain is secondary to L1 compression  Continue with multimodal pain managements, lidocaine patch.   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 CT and KUB c/w [place] syndrome. She did have bowel movements. NG tube was discontinued on 11/24.   GI consulted 11/26, Rectal tube placed.   OOB  Avoid narcotics  Optimization of electrolytes  KUB 11/27: R colon dilation to  12.4cm, slightly improved from last KUB. I discussed the findings with GI Dr. Bender, will repeat KUB in the afternoon, if still distended >12cm, will transfer patient to ICU and have neostigmine injection.       Fall  Assessment & Plan  PT/OT  Fall precautions    Hypomagnesemia  Assessment & Plan  Replace as indicated    Hypophosphatemia  Assessment & Plan  Replace as indicated    Hypokalemia  Assessment & Plan  Replace as indicated    Leukocytosis  Assessment & Plan  Secondary to bowel obstruction patient has leukocytosis  Continue monitor white blood cell count, improving  Resolved    Cancer (HCC)  Assessment & Plan  Patient reports history of right-sided breast cancer status post mastectomy and this is in remission.  Radiation had diagnosis initially 18 years ago first and then did have a recurrence and now she is status post radiation and surgical intervention      Hypertension  Assessment & Plan  Monitor blood pressure and adjust medications to keep systolic blood pressure less than 140 diastolic under 90.  Continue with Norvasc 5 mg daily  Continue with metoprolol XL 25 mg daily      Closed compression fracture of body of L1 vertebra (HCC)  Assessment & Plan  Need outpatient referral  Multimodal pain managements  PT/OT rec SNF placement, referred    Hyponatremia  Assessment & Plan  Initial sodium level is down secondary to dehydration  Improving with IVF      CN (constipation)  Assessment & Plan  Patient has chronic constipation and per the daughter has not had a bowel movement in at least 4 days but possibly longer as she just picked her up 4 days ago from [place].   Given suppository 11/23 and had bowel movements         VTE prophylaxis: enoxaparin ppx    I have performed a physical exam and reviewed and updated ROS and Plan today (11/27/2021). In review of yesterday's note (11/26/2021), there are no changes except as documented above.

## 2021-11-27 NOTE — CARE PLAN
The patient is Watcher - Medium risk of patient condition declining or worsening    Shift Goals  Clinical Goals: Pt will mobilize to the bathroom once overnight, Pt will have stool output increased.  Patient Goals: Pain control  Family Goals: Up for meals    Progress made toward(s) clinical / shift goals:  Pt has not mobilized to the restroom tonight. Pt stated she will attempt to ambulate this am. Pt rectal tube is in place with small amount of stool in tube but not in collection container.    Patient is not progressing towards the following goals:      Problem: Mobility  Goal: Patient's capacity to carry out activities will improve  Outcome: Not Progressing   Pt encouraged to mobilize. Pt refused overnight. Pt agreed to mobilizing this am after med pass.  Problem: Bowel Elimination  Goal: Establish and maintain regular bowel function  Outcome: Not Progressing   Pt has rectal tube in place. Abdomen is still distended. There is stool in the tube but not in collection container. Rectal tube flushed per protocol.

## 2021-11-27 NOTE — DISCHARGE PLANNING
Anticipated Discharge Disposition:   SNF, [facility], when medically cleared    Action:   Chart review complete.     Discussed patient's plan of care and plans for discharge during rounds. Per MD, patient is being followed by GI and is not medically cleared for discharge.     Patient has been accepted to [facility] and will need a COVID test prior to discharge.     RN CM will continue to follow.     Barriers to Discharge:   Medical Clearance    Plan:   Hospital care management will continue to assist with discharge planning needs.

## 2021-11-27 NOTE — PROGRESS NOTES
Assumed care of patient from Radha HATCH at 1500. Patient updated on plan of care, daughter bedside. Patient and daughter state understanding. Patient currently NPO with sips with medication, sitting in bed, pillows provided for comfort. Bed low and locked, call light within the reach.

## 2021-11-27 NOTE — CARE PLAN
The patient is Watcher - Medium risk of patient condition declining or worsening    Shift Goals  Clinical Goals: Pt will mobilize to the commode at least twice for this shift, Pt will take suppositoruy in the am      Progress made toward(s) clinical / shift goals:  progressing. Patient had watery bm, rectal tube placed, no output yet. Patient worked with PT, tolerated well with no nausea and tolerable pain

## 2021-11-27 NOTE — PROGRESS NOTES
"Assumed care of pt at 1915. Received report from Parisa HATCH. Pt resting in bed. Stated pain in neck was a 1 and in the back is a 4 on the 0 to 10 scale. Pt stated this is comfortable at this time. No medication intervention requested. No reports of abdominal pain or nausea. Distant normoactive abdominal sounds in upper quadrants. Hyperactive bowel sounds in lower quadrants. Rectal tube is in place with watery stool in tube. No stool in collection bag at this time. Purewick in place at this time. Educated pt that she needs to start ambulating when needing to void in order to help with GI motility. Pt stating that she does not want to get up at this time and \"just wants to sleep\". Pt stated she will  attempt to walk in the am. No other needs at this time. Call light in reach, bed in lowest position.  "

## 2021-11-27 NOTE — THERAPY
Physical Therapy   Daily Treatment     Patient Name: Stacy Kelley  Age:  76 y.o., Sex:  female  Medical Record #: [id number]  Today's Date: 11/27/2021          Assessment    Improved standing tolerance ambulation limited by rectal tube    Plan    Continue current treatment plan.      11/27/21 1500   Total Time Spent   Total Time Spent (Mins) 30   Charge Group   PT Gait Training 1   PT Therapeutic Activities 1   Pain 0 - 10 Group   Therapist Pain Assessment 3   Supine Lower Body Exercise   Supine Lower Body Exercises Yes   Balance   Sitting Balance (Static) Fair +   Sitting Balance (Dynamic) Fair +   Standing Balance (Static) Fair   Standing Balance (Dynamic) Fair   Weight Shift Sitting Fair   Weight Shift Standing Fair   Gait Analysis   Gait Level Of Assist Supervised   Assistive Device Front Wheel Walker   Distance (Feet) 10   # of Times Distance was Traveled 2   Deviation Step To   Weight Bearing Status full   Bed Mobility    Supine to Sit Minimal Assist   Sit to Supine Minimal Assist   Scooting Moderate Assist   Functional Mobility   Sit to Stand Minimal Assist   Bed, Chair, Wheelchair Transfer Minimal Assist   Toilet Transfers Minimal Assist   Transfer Method Stand Step   Activity Tolerance   Sitting Edge of Bed 10   Standing 2 x 5 mins   Short Term Goals    Short Term Goal # 1 Pt will be able to perform bed mobility and sup <> sit Alonzo in 6 visits.   Goal Outcome # 1 Progressing slower than expected   Short Term Goal # 2 Pt will be able to perform sit <> stand and transfer Alonzo in 6 visits so can DC home safely.   Goal Outcome # 2 Progressing slower than expected   Short Term Goal # 3 Pt will be able to ambulate 150 ft with FWW Alonzo in 6 visits so can DC home safely.   Goal Outcome # 3 Progressing slower than expected   Anticipated Discharge Equipment and Recommendations   Discharge Recommendations Recommend post-acute placement for additional physical therapy services prior to discharge home    Interdisciplinary Plan of Care Collaboration   IDT Collaboration with  Nursing   Session Information   Date / Session Number  11/27-4 3/5 11/29       DC Equipment Recommendations: Unable to determine at this time  Discharge Recommendations: (P) Recommend post-acute placement for additional physical therapy services prior to discharge home

## 2021-11-27 NOTE — PROGRESS NOTES
Gastroenterology Progress Note     Author: Rashi Irving M.D.   Date & Time Created: 11/27/2021 9:29 AM    Chief Complaint:  Colonic pseudobstruction    Interval History:  Feels better today. Less distended. Nursing reports some stool in rectal bag and emptying some air. Patient denies abd pain. No n/v.     Review of Systems:  Review of Systems   Constitutional: Negative for chills and fever.   HENT: Positive for hearing loss.    Respiratory: Negative for shortness of breath.    Cardiovascular: Negative for chest pain.   Gastrointestinal: Positive for constipation. Negative for abdominal pain, blood in stool, diarrhea, melena, nausea and vomiting.       Physical Exam:  Physical Exam  Constitutional:       Appearance: Normal appearance. She is obese. She is not ill-appearing.   HENT:      Head: Atraumatic.   Eyes:      Extraocular Movements: Extraocular movements intact.   Cardiovascular:      Rate and Rhythm: Normal rate and regular rhythm.      Heart sounds: No murmur heard.      Pulmonary:      Effort: Pulmonary effort is normal.      Breath sounds: Normal breath sounds. No stridor. No wheezing.   Abdominal:      General: There is distension.      Palpations: Abdomen is soft.      Tenderness: There is no abdominal tenderness. There is no guarding or rebound.   Skin:     General: Skin is warm and dry.   Neurological:      Mental Status: She is alert and oriented to person, place, and time.   Psychiatric:         Mood and Affect: Mood normal.         Behavior: Behavior normal.         Thought Content: Thought content normal.         Judgment: Judgment normal.         Labs:          Recent Labs     11/25/21 0142 11/26/21 0206 11/27/21 0417   SODIUM 128* 131* 131*   POTASSIUM 3.4* 3.1* 3.3*   CHLORIDE 93* 94* 95*   CO2 20 24 22   BUN 15 17 30*   CREATININE 0.32* 0.35* 0.41*   MAGNESIUM  --  1.4* 1.5   PHOSPHORUS 2.0* 2.3* 2.5   CALCIUM 8.8 9.4 8.4     Recent Labs     11/25/21 0142 11/26/21 0206 11/27/21 0417    GLUCOSE 165* 178* 173*     Recent Labs     21  0142 21  0206 21  0417   RBC 4.36 4.40 3.81*   HEMOGLOBIN 14.1 14.2 11.9*   HEMATOCRIT 42.4 40.7 35.3*   PLATELETCT 454* 534* 471*     Recent Labs     21  0142 21  0206 21  0417   WBC 8.8 7.9 8.8   NEUTSPOLYS 64.40 58.60 59.80   LYMPHOCYTES 20.50* 27.00 26.30   MONOCYTES 11.60 11.30 10.40   EOSINOPHILS 1.80 1.10 1.80   BASOPHILS 0.80 0.60 0.60     Hemodynamics:  Temp (24hrs), Av.6 °C (97.8 °F), Min:36.4 °C (97.6 °F), Max:36.7 °C (98 °F)  Temperature: 36.4 °C (97.6 °F)  Pulse  Av.2  Min: 87  Max: 117   Blood Pressure : 152/73     Respiratory:    Respiration: 16, Pulse Oximetry: 91 %     Work Of Breathing / Effort: Within Normal Limits  RUL Breath Sounds: Clear, RML Breath Sounds: Diminished, RLL Breath Sounds: Diminished, JOMAR Breath Sounds: Clear, LLL Breath Sounds: Diminished  Fluids:    Intake/Output Summary (Last 24 hours) at 2021 0929  Last data filed at 2021 0500  Gross per 24 hour   Intake --   Output 30 ml   Net -30 ml        GI/Nutrition:  Orders Placed This Encounter   Procedures   • Diet NPO Restrict to: Sips with Medications     Standing Status:   Standing     Number of Occurrences:   1     Order Specific Question:   Diet NPO Restrict to:     Answer:   Sips with Medications [3]     Medical Decision Making, by Problem:  Active Hospital Problems    Diagnosis    • *Acute midline low back pain without sciatica [M54.50]    • 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 If the KUB does not suggest a marked decrease in the cecal diameter to 12 cm or less then I think that we should proceed with Neostigmine treatment and I discussed this with patient and her daughter in detail - the risks and  Benefits and alternatives. Will need to go to ICU for this treatment and have atropine at the bedside. First dose is 1mg IV over 3 minutes. She has no known contraindications.   2) Nausea and vomiting - none now  3) Long term use of opiates  4) Back fracture    Will follow    Rashi Irving MD    Quality-Core Measures

## 2021-11-28 ENCOUNTER — APPOINTMENT (OUTPATIENT)
Dept: RADIOLOGY | Facility: MEDICAL CENTER | Age: 76
DRG: 552 | End: 2021-11-28
Attending: STUDENT IN AN ORGANIZED HEALTH CARE EDUCATION/TRAINING PROGRAM
Payer: MEDICARE

## 2021-11-28 PROBLEM — E11.9 TYPE 2 DIABETES MELLITUS WITHOUT COMPLICATION, WITHOUT LONG-TERM CURRENT USE OF INSULIN (HCC): Status: ACTIVE | Noted: 2021-11-28

## 2021-11-28 PROBLEM — K59.00 CN (CONSTIPATION): Status: RESOLVED | Noted: 2021-11-21 | Resolved: 2021-11-28

## 2021-11-28 LAB
ANION GAP SERPL CALC-SCNC: 10 MMOL/L (ref 7–16)
BUN SERPL-MCNC: 14 MG/DL (ref 8–22)
CALCIUM SERPL-MCNC: 8.5 MG/DL (ref 8.4–10.2)
CHLORIDE SERPL-SCNC: 96 MMOL/L (ref 96–112)
CO2 SERPL-SCNC: 23 MMOL/L (ref 20–33)
CREAT SERPL-MCNC: 0.32 MG/DL (ref 0.5–1.4)
GLUCOSE BLD-MCNC: 214 MG/DL (ref 65–99)
GLUCOSE BLD-MCNC: 218 MG/DL (ref 65–99)
GLUCOSE SERPL-MCNC: 132 MG/DL (ref 65–99)
MAGNESIUM SERPL-MCNC: 1.6 MG/DL (ref 1.5–2.5)
PHOSPHATE SERPL-MCNC: 2.5 MG/DL (ref 2.5–4.5)
POTASSIUM SERPL-SCNC: 3.5 MMOL/L (ref 3.6–5.5)
SODIUM SERPL-SCNC: 129 MMOL/L (ref 135–145)

## 2021-11-28 PROCEDURE — 700102 HCHG RX REV CODE 250 W/ 637 OVERRIDE(OP): Performed by: STUDENT IN AN ORGANIZED HEALTH CARE EDUCATION/TRAINING PROGRAM

## 2021-11-28 PROCEDURE — A9270 NON-COVERED ITEM OR SERVICE: HCPCS | Performed by: NURSE PRACTITIONER

## 2021-11-28 PROCEDURE — 700101 HCHG RX REV CODE 250: Performed by: STUDENT IN AN ORGANIZED HEALTH CARE EDUCATION/TRAINING PROGRAM

## 2021-11-28 PROCEDURE — 84100 ASSAY OF PHOSPHORUS: CPT

## 2021-11-28 PROCEDURE — 700101 HCHG RX REV CODE 250: Performed by: FAMILY MEDICINE

## 2021-11-28 PROCEDURE — 700102 HCHG RX REV CODE 250 W/ 637 OVERRIDE(OP): Performed by: NURSE PRACTITIONER

## 2021-11-28 PROCEDURE — 80048 BASIC METABOLIC PNL TOTAL CA: CPT

## 2021-11-28 PROCEDURE — 74018 RADEX ABDOMEN 1 VIEW: CPT

## 2021-11-28 PROCEDURE — 82962 GLUCOSE BLOOD TEST: CPT | Mod: 91

## 2021-11-28 PROCEDURE — A9270 NON-COVERED ITEM OR SERVICE: HCPCS | Performed by: STUDENT IN AN ORGANIZED HEALTH CARE EDUCATION/TRAINING PROGRAM

## 2021-11-28 PROCEDURE — 99232 SBSQ HOSP IP/OBS MODERATE 35: CPT | Performed by: FAMILY MEDICINE

## 2021-11-28 PROCEDURE — 770020 HCHG ROOM/CARE - TELE (206)

## 2021-11-28 PROCEDURE — 700102 HCHG RX REV CODE 250 W/ 637 OVERRIDE(OP): Performed by: FAMILY MEDICINE

## 2021-11-28 PROCEDURE — 700102 HCHG RX REV CODE 250 W/ 637 OVERRIDE(OP): Performed by: INTERNAL MEDICINE

## 2021-11-28 PROCEDURE — A9270 NON-COVERED ITEM OR SERVICE: HCPCS | Performed by: INTERNAL MEDICINE

## 2021-11-28 PROCEDURE — 83735 ASSAY OF MAGNESIUM: CPT

## 2021-11-28 PROCEDURE — A9270 NON-COVERED ITEM OR SERVICE: HCPCS | Performed by: FAMILY MEDICINE

## 2021-11-28 PROCEDURE — 700111 HCHG RX REV CODE 636 W/ 250 OVERRIDE (IP): Performed by: INTERNAL MEDICINE

## 2021-11-28 PROCEDURE — 700105 HCHG RX REV CODE 258: Performed by: FAMILY MEDICINE

## 2021-11-28 RX ORDER — METOPROLOL SUCCINATE 25 MG/1
25 TABLET, EXTENDED RELEASE ORAL ONCE
Status: COMPLETED | OUTPATIENT
Start: 2021-11-28 | End: 2021-11-28

## 2021-11-28 RX ORDER — SODIUM CHLORIDE AND POTASSIUM CHLORIDE 150; 900 MG/100ML; MG/100ML
INJECTION, SOLUTION INTRAVENOUS CONTINUOUS
Status: DISCONTINUED | OUTPATIENT
Start: 2021-11-28 | End: 2021-11-29

## 2021-11-28 RX ORDER — DONEPEZIL HYDROCHLORIDE 5 MG/1
5 TABLET, FILM COATED ORAL NIGHTLY
Status: DISCONTINUED | OUTPATIENT
Start: 2021-11-28 | End: 2021-11-30 | Stop reason: HOSPADM

## 2021-11-28 RX ORDER — DEXTROSE MONOHYDRATE 25 G/50ML
50 INJECTION, SOLUTION INTRAVENOUS
Status: DISCONTINUED | OUTPATIENT
Start: 2021-11-28 | End: 2021-11-30 | Stop reason: HOSPADM

## 2021-11-28 RX ORDER — METOPROLOL SUCCINATE 25 MG/1
50 TABLET, EXTENDED RELEASE ORAL DAILY
Status: DISCONTINUED | OUTPATIENT
Start: 2021-11-29 | End: 2021-11-30 | Stop reason: HOSPADM

## 2021-11-28 RX ORDER — POLYETHYLENE GLYCOL 3350 17 G/17G
1 POWDER, FOR SOLUTION ORAL 2 TIMES DAILY
Status: DISCONTINUED | OUTPATIENT
Start: 2021-11-28 | End: 2021-11-30 | Stop reason: HOSPADM

## 2021-11-28 RX ADMIN — METOPROLOL SUCCINATE 25 MG: 25 TABLET, EXTENDED RELEASE ORAL at 05:30

## 2021-11-28 RX ADMIN — DONEPEZIL HYDROCHLORIDE 5 MG: 5 TABLET, FILM COATED ORAL at 20:23

## 2021-11-28 RX ADMIN — METFORMIN HYDROCHLORIDE 500 MG: 500 TABLET, EXTENDED RELEASE ORAL at 08:10

## 2021-11-28 RX ADMIN — LIDOCAINE 1 PATCH: 50 PATCH TOPICAL at 11:51

## 2021-11-28 RX ADMIN — ENOXAPARIN SODIUM 40 MG: 40 INJECTION SUBCUTANEOUS at 05:31

## 2021-11-28 RX ADMIN — Medication 400 MG: at 17:14

## 2021-11-28 RX ADMIN — LIDOCAINE 1 PATCH: 50 PATCH TOPICAL at 05:31

## 2021-11-28 RX ADMIN — METFORMIN HYDROCHLORIDE 500 MG: 500 TABLET, EXTENDED RELEASE ORAL at 17:14

## 2021-11-28 RX ADMIN — METAXALONE 800 MG: 800 TABLET ORAL at 17:14

## 2021-11-28 RX ADMIN — METOPROLOL SUCCINATE 25 MG: 25 TABLET, EXTENDED RELEASE ORAL at 13:14

## 2021-11-28 RX ADMIN — POTASSIUM CHLORIDE AND SODIUM CHLORIDE: 900; 150 INJECTION, SOLUTION INTRAVENOUS at 11:33

## 2021-11-28 RX ADMIN — AMLODIPINE BESYLATE 5 MG: 5 TABLET ORAL at 20:23

## 2021-11-28 RX ADMIN — METAXALONE 800 MG: 800 TABLET ORAL at 11:52

## 2021-11-28 RX ADMIN — METAXALONE 800 MG: 800 TABLET ORAL at 05:30

## 2021-11-28 RX ADMIN — POTASSIUM PHOSPHATE, MONOBASIC POTASSIUM PHOSPHATE, DIBASIC 15 MMOL: 224; 236 INJECTION, SOLUTION, CONCENTRATE INTRAVENOUS at 14:37

## 2021-11-28 RX ADMIN — POLYETHYLENE GLYCOL 3350 1 PACKET: 17 POWDER, FOR SOLUTION ORAL at 08:10

## 2021-11-28 RX ADMIN — ACETAMINOPHEN 650 MG: 325 TABLET, FILM COATED ORAL at 08:10

## 2021-11-28 ASSESSMENT — ENCOUNTER SYMPTOMS
SHORTNESS OF BREATH: 0
DIAPHORESIS: 0
CONSTIPATION: 0
MYALGIAS: 1
LOSS OF CONSCIOUSNESS: 0
NAUSEA: 0
DIZZINESS: 0
SEIZURES: 0
COUGH: 0
DOUBLE VISION: 0
EYES NEGATIVE: 1
FEVER: 0
CHILLS: 0
WHEEZING: 0
HEADACHES: 0
NECK PAIN: 1
ABDOMINAL PAIN: 0
PALPITATIONS: 0
HEMOPTYSIS: 0
MEMORY LOSS: 1
NEUROLOGICAL NEGATIVE: 1
RESPIRATORY NEGATIVE: 1
CARDIOVASCULAR NEGATIVE: 1
HEARTBURN: 0
BLOOD IN STOOL: 0
DIARRHEA: 0
FOCAL WEAKNESS: 0
BACK PAIN: 1
VOMITING: 0
BRUISES/BLEEDS EASILY: 0
CONSTIPATION: 1
NERVOUS/ANXIOUS: 0
DEPRESSION: 0

## 2021-11-28 ASSESSMENT — VISUAL ACUITY: OU: 1

## 2021-11-28 ASSESSMENT — PAIN DESCRIPTION - PAIN TYPE
TYPE: ACUTE PAIN

## 2021-11-28 ASSESSMENT — FIBROSIS 4 INDEX: FIB4 SCORE: 0.83

## 2021-11-28 ASSESSMENT — LIFESTYLE VARIABLES: SUBSTANCE_ABUSE: 0

## 2021-11-28 NOTE — ASSESSMENT & PLAN NOTE
- On metformin and glipizide at home - is on Metformin here, will check SSI/fingersticks now that she is no longer NPO

## 2021-11-28 NOTE — CARE PLAN
The patient is Watcher - Medium risk of patient condition declining or worsening    Shift Goals  Clinical Goals: Pain control  Patient Goals: Stand  Family Goals: Up for meals    Progress made toward(s) clinical / shift goals:  Pain controlled with distraction, rest, and repositioning/     Problem: Pain - Standard  Goal: Alleviation of pain or a reduction in pain to the patient’s comfort goal  Outcome: Progressing     Problem: Fall Risk  Goal: Patient will remain free from falls  Outcome: Progressing     Problem: Skin Integrity  Goal: Skin integrity is maintained or improved  Outcome: Progressing     Problem: Mobility  Goal: Patient's capacity to carry out activities will improve  Outcome: Progressing       Patient is not progressing towards the following goals:

## 2021-11-28 NOTE — PROGRESS NOTES
Critical Care Progress Note    Date of admission  11/21/2021    Chief Complaint  76 y.o. female admitted 11/21/2021 with nausea, vomiting - noted to have dilated small and large bowel loops w/o mechanical obstruction on CT A/P. She had NGT placed and suctioned with progressively decreased output and also had BMs on 11/23 so NGT was removed on 11/24. Repeat KUB showed continued colonic dilation up to 15cm so rectal tube was placed. Repeat KUB on 11/27 showed dilation of 12cm w/improved n/v, however colonic dilation did not improve on repeat imaging 11/27 evening. Since it remained >12cm, decision was made to bring her to ICU and administer neostigmine    She tolerated first dose of neostigmine around 1700 - 2mg pushed over 5 min. No adverse side effects noted.     Review of Systems  Review of Systems   Constitutional: Positive for malaise/fatigue. Negative for chills and fever.   Respiratory: Positive for shortness of breath. Negative for cough.    Gastrointestinal: Positive for abdominal pain. Negative for diarrhea, nausea and vomiting.        Vital Signs for last 24 hours   Temp:  [36.4 °C (97.6 °F)-36.7 °C (98 °F)] 36.4 °C (97.6 °F)  Pulse:  [] 76  Resp:  [16-30] 30  BP: (109-161)/(55-78) 161/77  SpO2:  [91 %-94 %] 94 %    Hemodynamic parameters for last 24 hours       Respiratory Information for the last 24 hours       Physical Exam   Physical Exam  Vitals and nursing note reviewed.   Constitutional:       General: She is not in acute distress.     Appearance: Normal appearance. She is not ill-appearing or toxic-appearing.   HENT:      Head: Normocephalic and atraumatic.      Nose: Nose normal.      Mouth/Throat:      Mouth: Mucous membranes are moist.   Eyes:      General: No scleral icterus.     Conjunctiva/sclera: Conjunctivae normal.   Cardiovascular:      Rate and Rhythm: Normal rate and regular rhythm.   Pulmonary:      Effort: Pulmonary effort is normal. No respiratory distress.      Breath sounds:  No wheezing, rhonchi or rales.   Abdominal:      General: There is distension.      Tenderness: There is abdominal tenderness.   Musculoskeletal:         General: No deformity or signs of injury. Normal range of motion.      Cervical back: Normal range of motion.   Skin:     General: Skin is warm and dry.   Neurological:      General: No focal deficit present.      Mental Status: She is alert. Mental status is at baseline.   Psychiatric:         Mood and Affect: Mood normal.         Behavior: Behavior normal.         Medications  Current Facility-Administered Medications   Medication Dose Route Frequency Provider Last Rate Last Admin   • potassium chloride 20 mEq in LR 1,000 mL infusion   Intravenous Continuous Orin Fernandez M.D. 50 mL/hr at 11/27/21 1411 New Bag at 11/27/21 1411   • atropine injection 0.4 mg  0.4 mg Intravenous QDAY PRN Valeria Carpenter M.D.       • ipratropium-albuterol (DUONEB) nebulizer solution  3 mL Nebulization Q4H PRN (RT) Valeria Carpenter M.D.       • EPINEPHrine (Adrenalin) 4 mg in  mL Infusion  0-10 mcg/min Intravenous Continuous Valeria Carpenter M.D.       • EPINEPHrine (Adrenaline) (ACLS IV PUSH DOSE) Syringe 1 mg  1 mg Intravenous QDAY PRN Valeria Carpenter M.D.       • traMADol (ULTRAM) 50 MG tablet 50 mg  50 mg Oral Q6HRS PRN Orin Fernandez M.D.       • lidocaine (LIDODERM) 5 % 1 Patch  1 Patch Transdermal DAILY Orin Fernandez M.D.   1 Patch at 11/25/21 0517   • acetaminophen (Tylenol) tablet 650 mg  650 mg Oral Q4HRS PRN Orin Fernandez M.D.   650 mg at 11/23/21 1402   • lidocaine (LIDODERM) 5 % 1 Patch  1 Patch Transdermal Q24HR Orin Fernandez M.D.   1 Patch at 11/27/21 1235   • amLODIPine (NORVASC) tablet 5 mg  5 mg Oral QHS Denita Lombardi, D.O.   5 mg at 11/25/21 2208   • metoprolol SR (TOPROL XL) tablet 25 mg  25 mg Oral DAILY Denita Lombardi D.O.   25 mg at 11/27/21 0539   • enoxaparin (LOVENOX) inj 40 mg  40 mg Subcutaneous DAILY Denita Lombardi D.O.   40 mg  at 11/27/21 0538   • enalaprilat (Vasotec) injection 1.25 mg 1 mL  1.25 mg Intravenous Q6HRS PRN Denita Lombardi D.O.       • labetalol (NORMODYNE/TRANDATE) injection 10 mg  10 mg Intravenous Q4HRS PRN KEYUR Castro.O.       • ondansetron (ZOFRAN) syringe/vial injection 4 mg  4 mg Intravenous Q4HRS PRN WIL CastroO.       • ondansetron (ZOFRAN ODT) dispertab 4 mg  4 mg Oral Q4HRS PRN KEYUR Castro.O.       • Pharmacy Consult Request ...Pain Management Review 1 Each  1 Each Other PHARMACY TO DOSE Denita Lombardi D.O.       • metaxalone (Skelaxin) tablet 800 mg  800 mg Oral TID WIL CastroORobyn   800 mg at 11/27/21 1235   • metFORMIN ER (GLUCOPHAGE XR) tablet 500 mg  500 mg Oral BID WITH MEALS KEYUR Castro.O.   500 mg at 11/27/21 0753       Fluids    Intake/Output Summary (Last 24 hours) at 11/27/2021 1725  Last data filed at 11/27/2021 0500  Gross per 24 hour   Intake --   Output 30 ml   Net -30 ml       Laboratory          Recent Labs     11/25/21 0142 11/26/21 0206 11/27/21  0417   SODIUM 128* 131* 131*   POTASSIUM 3.4* 3.1* 3.3*   CHLORIDE 93* 94* 95*   CO2 20 24 22   BUN 15 17 30*   CREATININE 0.32* 0.35* 0.41*   MAGNESIUM  --  1.4* 1.5   PHOSPHORUS 2.0* 2.3* 2.5   CALCIUM 8.8 9.4 8.4     Recent Labs     11/25/21 0142 11/26/21  0206 11/27/21  0417   GLUCOSE 165* 178* 173*     Recent Labs     11/25/21 0142 11/26/21 0206 11/27/21  0417   WBC 8.8 7.9 8.8   NEUTSPOLYS 64.40 58.60 59.80   LYMPHOCYTES 20.50* 27.00 26.30   MONOCYTES 11.60 11.30 10.40   EOSINOPHILS 1.80 1.10 1.80   BASOPHILS 0.80 0.60 0.60     Recent Labs     11/25/21  0142 11/26/21  0206 11/27/21  0417   RBC 4.36 4.40 3.81*   HEMOGLOBIN 14.1 14.2 11.9*   HEMATOCRIT 42.4 40.7 35.3*   PLATELETCT 454* 534* 471*       Imaging  Reviewed     Assessment/Plan    Taryn syndrome  S/p NGT decompression until 11/24 and then rectal tube decompression w/mild impovement only - still dilated to >12cm so given neostigmine in ICU on 11/27 PM. She  tolerated it well.  - repeat KUB in 3 hrs and if colon still dilated, will administer second dose  - atropine and epi bedside  - monitor for worsening abdominal pain, any n/v or any signs of sepsis or instability - low threshold to evaluate for colonic ischemia if any of these are present  - continue rectal tube  - monitor lytes  - appreciate GI recs  - avoid narcotics    LBP w/o sciatica   - pain control w/o opioid use    Electrolyte derangements  - replete as needed    VTE:  Lovenox  Ulcer: Not Indicated  Lines: None    I have performed a physical exam and reviewed and updated ROS and Plan today (11/27/2021). In review of yesterday's note (11/26/2021), there are no changes except as documented above.     Discussed patient condition and risk of morbidity and/or mortality with Hospitalist, RN, RT and Pharmacy  The patient remains critically ill.  Critical care time = 60 minutes in directly providing and coordinating critical care and extensive data review.  No time overlap and excludes procedures.        Valeria Carpenter MD  Pulmonary and Critical Care Medicine  [facility]

## 2021-11-28 NOTE — PROGRESS NOTES
Received pt from Lisa HATCH, family at bedside. Pt states she feels relief in abdominal region after receiving the medication neostigmine. After repeat abdominal xray, MD stated improvement shown and pt can now be downgraded to med tele status as a second dose is not required. All safety measures in place. Assuming pt care at this time.

## 2021-11-28 NOTE — PROGRESS NOTES
Gastroenterology Progress Note     Author: ANANDA Howard   Date & Time Created: 11/28/2021 7:17 AM    Chief Complaint:  Colonic pseudobstruction    Interval History:  11/27: Feels better today. Less distended. Nursing reports some stool in rectal bag and emptying some air. Patient denies abd pain. No n/v.     11/28/2021: Patient was moved to ICU last night due to need for Neostigmine. She received one dose of Neostigmine. Repeat KUB demonstrated improvement and a second dose was not administered. Repeat KUB this AM with normal appearing colon without dilation.     Presenting Chief Complaint:  Abdominal distension, Colonic pseudo-obstruction        History of Present Illness:    She is a 76-year-old female patient seen in consultation for abdominal distention and colonic pseudoobstruction.  The patient was admitted to the hospital 11/21/2021 with low back pain.  Apparently last week on Tuesday the patient had a fall at her home injuring to [place].  She did not tell her family, but she had persistent back pain which prompted her to seek medical care.  She had an MRI on 11/21/2021 with findings of an acute L1 compression fracture, and was brought to the hospital for continued management.  Upon admission she was noted to have low sodium.  She was noted to have abdominal distention and developed some nausea and vomiting.  CT scanning of the abdomen and pelvis initially noted dilated small and large bowel loops with no transition point and no definite mechanical obstruction.  She initially had an NG tube placed for decompression with decreased output over 1 to 2 days.  She was given suppositories with 2 loose bowel movements on 1123 and the NG tube output decreased therefore it was discontinued on 1124.  However, the patient continued to have distention and repeat KUB evening of 11/25/2021 demonstrates persistent dilation of the bowel predominantly affecting the colon extending down to the rectum due to dysmotility  versus [place] syndrome.  Right colon dilated up to 15 cm.     Currently she denies nausea, vomiting.  She does have mild to moderate abdominal pain with distention.  She had 1 loose small bowel movement this morning.  BMP with sodium 131, potassium 3.1, glucose 178, phosphorus 2.3, magnesium 1.4.  She has never seen a GI doctor nor had an endoscopy or colonoscopy.    Review of Systems:  Review of Systems   Constitutional: Negative for chills and fever.   HENT: Positive for hearing loss.    Respiratory: Negative for shortness of breath.    Cardiovascular: Negative for chest pain.   Gastrointestinal: Positive for constipation. Negative for abdominal pain, blood in stool, diarrhea, melena, nausea and vomiting.       Physical Exam:  Physical Exam  Constitutional:       Appearance: Normal appearance. She is obese. She is not ill-appearing.   HENT:      Head: Atraumatic.   Eyes:      Extraocular Movements: Extraocular movements intact.   Cardiovascular:      Rate and Rhythm: Normal rate and regular rhythm.      Heart sounds: No murmur heard.      Pulmonary:      Effort: Pulmonary effort is normal.      Breath sounds: Normal breath sounds. No stridor. No wheezing.   Abdominal:      General: Abdomen is flat. Bowel sounds are normal. There is no distension.      Palpations: Abdomen is soft.      Tenderness: There is no abdominal tenderness. There is no guarding or rebound.   Skin:     General: Skin is warm and dry.   Neurological:      Mental Status: She is alert and oriented to person, place, and time.   Psychiatric:         Mood and Affect: Mood normal.         Behavior: Behavior normal.         Thought Content: Thought content normal.         Judgment: Judgment normal.         Labs:          Recent Labs     11/26/21  0206 11/27/21  0417 11/28/21  0335   SODIUM 131* 131* 129*   POTASSIUM 3.1* 3.3* 3.5*   CHLORIDE 94* 95* 96   CO2 24 22 23   BUN 17 30* 14   CREATININE 0.35* 0.41* 0.32*   MAGNESIUM 1.4* 1.5 1.6   PHOSPHORUS  2.3* 2.5 2.5   CALCIUM 9.4 8.4 8.5     Recent Labs     21  0335   GLUCOSE 178* 173* 132*     Recent Labs     21   RBC 4.40 3.81*   HEMOGLOBIN 14.2 11.9*   HEMATOCRIT 40.7 35.3*   PLATELETCT 534* 471*     Recent Labs     21   WBC 7.9 8.8   NEUTSPOLYS 58.60 59.80   LYMPHOCYTES 27.00 26.30   MONOCYTES 11.30 10.40   EOSINOPHILS 1.10 1.80   BASOPHILS 0.60 0.60     Hemodynamics:  Temp (24hrs), Av.8 °C (98.2 °F), Min:36.4 °C (97.6 °F), Max:37.1 °C (98.7 °F)  Temperature: 37 °C (98.6 °F)  Pulse  Av.6  Min: 76  Max: 136   Blood Pressure : 148/63     Respiratory:    Respiration: (!) 31, Pulse Oximetry: 97 %     Work Of Breathing / Effort: Shallow;Tachypnea  RUL Breath Sounds: Clear, RML Breath Sounds: Diminished, RLL Breath Sounds: Diminished, JOMAR Breath Sounds: Clear, LLL Breath Sounds: Diminished  Fluids:    Intake/Output Summary (Last 24 hours) at 2021 0929  Last data filed at 2021 0500  Gross per 24 hour   Intake --   Output 30 ml   Net -30 ml     Weight: 93 kg (205 lb 0.4 oz)  GI/Nutrition:  Orders Placed This Encounter   Procedures   • Diet NPO Restrict to: Sips with Medications     Standing Status:   Standing     Number of Occurrences:   1     Order Specific Question:   Diet NPO Restrict to:     Answer:   Sips with Medications [3]     Medical Decision Making, by Problem:  Active Hospital Problems    Diagnosis    • *Acute midline low back pain without sciatica [M54.50]    • Taryn syndrome [K59.81]    • Hypokalemia [E87.6]    • Hypophosphatemia [E83.39]    • Hypomagnesemia [E83.42]    • Fall [W19.XXXA]    • CN (constipation) [K59.00]    • Hyponatremia [E87.1]    • Closed compression fracture of body of L1 vertebra (HCC) [S32.010A]    • Hypertension [I10]    • Cancer (HCC) [C80.1]    • Leukocytosis [D72.829]        Plan:    Impression and Plan    Assessment:  1.  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  Hyponatremia, hypokalemia, hypophosphatemia, hypomagnesemia     3.  Closed compression fracture of the body of L1 vertebrae     4.  Hypertension     5.  Obesity    Plan:  - Discontinue rectal tube  - Ok for full liquid diet today  - Miralax 17 g BID oral  - Advance diet tomorrow if tolerated. Monitor for increasing distension  - Continue to correct electrolytes    GI will sign off. Please call for any questions, concerns, or change in clinical status. Patient will need follow up in 2-3 months with [facility] to consider screening colonoscopy    Quality-Core Measures

## 2021-11-28 NOTE — PROGRESS NOTES
[facility] Medicine Daily Progress Note    Date of Service  11/28/2021    Chief Complaint  Stacy Kelley is a 76 y.o. female admitted 11/21/2021 with low back pain secondary to ground-level fall    Hospital Course  Stacy Kelley is a 76 y.o. female who lives in [place] with her  who himself has multiple medical problems.  Patient apparently fell on Tuesday of last week and did not tell the family.  The pain persisted and finally the daughter recognized the situation and brought her to [facility] for evaluation.  Here then she took her to urgent care where she was evaluated imaging studies and recommendation was to get her an MRI.  They were able to complete the MRI 11/21 and this shows an acute L1 compression fracture thus she was brought to the hospital for continued management and evaluation.  Subsequently was found that the patient was also hyponatremic on admision.      The patient was noted to have nausea, vomiting. CT abd noted Dilated small and large bowel loops with no findings consistent with mechanical obstruction identified. NG tube was placed. Patient had bowel movements 11/23. NG tube output decreased. NG discontinued on 11/24. Still distended, repeat KUB showing dilated colon, right colon dilated up to 15cm. GI consulted, rectal tube placed.      Interval Problem Update  11/28 - Pt received Neostigmine x 1 yesterday with subsequent positive stooling and gas; repeat KUB with resolved dilation. Pt states she feels more comfortable today, had FLD for breakfast and did well. Bps are elevated, will adjust medications as this has been the case for a couple of days intermittently. Will plan for FLD today, regular diet tomorrow and SNF on Tuesday if she tolerates a regular diet.     I have personally seen and examined the patient at bedside. I discussed the plan of care with patient, family, bedside RN, charge RN,  and pharmacy.    Consultants/Specialty  GI  Critical Care     Code Status    Full Code    Disposition  Patient is not medically cleared.   Anticipate discharge to to skilled nursing facility.  I have placed the appropriate orders for post-discharge needs.    Review of Systems  Review of Systems   Constitutional: Positive for malaise/fatigue. Negative for chills, diaphoresis and fever.   HENT: Negative.    Eyes: Negative.  Negative for double vision.   Respiratory: Negative.  Negative for cough, hemoptysis and wheezing.    Cardiovascular: Negative.  Negative for chest pain, palpitations and leg swelling.   Gastrointestinal: Negative for abdominal pain, blood in stool, constipation, diarrhea, heartburn, nausea and vomiting.   Genitourinary: Negative.  Negative for frequency, hematuria and urgency.   Musculoskeletal: Positive for back pain, myalgias and neck pain. Negative for joint pain.   Skin: Negative.  Negative for itching and rash.   Neurological: Negative.  Negative for dizziness, focal weakness, seizures, loss of consciousness and headaches.   Endo/Heme/Allergies: Negative.  Does not bruise/bleed easily.   Psychiatric/Behavioral: Positive for memory loss. Negative for depression, substance abuse and suicidal ideas. The patient is not nervous/anxious.    All other systems reviewed and are negative.       Physical Exam  Temp:  [36.4 °C (97.6 °F)-37.1 °C (98.7 °F)] 37 °C (98.6 °F)  Pulse:  [] 102  Resp:  [16-63] 21  BP: (103-184)/(60-95) 170/83  SpO2:  [92 %-97 %] 94 %    Physical Exam  Vitals and nursing note reviewed. Exam conducted with a chaperone present.   Constitutional:       General: She is awake.      Appearance: Normal appearance. She is well-developed, well-groomed and normal weight.   HENT:      Head: Normocephalic and atraumatic.      Jaw: There is normal jaw occlusion. No trismus.      Salivary Glands: Right salivary gland is not tender. Left salivary gland is not tender.      Right Ear: External ear normal.      Left Ear: External ear normal.      Mouth/Throat:       Mouth: Mucous membranes are dry.      Pharynx: Oropharynx is clear.   Eyes:      General: Lids are normal. Vision grossly intact.      Extraocular Movements: Extraocular movements intact.      Conjunctiva/sclera: Conjunctivae normal.      Right eye: Right conjunctiva is not injected. No exudate.     Left eye: Left conjunctiva is not injected. No exudate.     Pupils: Pupils are equal, round, and reactive to light.   Neck:      Thyroid: No thyroid mass.      Vascular: No hepatojugular reflux or JVD.      Trachea: No abnormal tracheal secretions or tracheal deviation.   Cardiovascular:      Rate and Rhythm: Normal rate and regular rhythm. Occasional extrasystoles are present.     Pulses: Normal pulses.      Heart sounds: Normal heart sounds. No murmur heard.  No friction rub.   Pulmonary:      Effort: Pulmonary effort is normal.      Breath sounds: No wheezing or rhonchi.   Abdominal:      General: Abdomen is flat. Bowel sounds are normal. There is no distension.      Palpations: Abdomen is soft.      Tenderness: There is no abdominal tenderness (improving). There is no right CVA tenderness or left CVA tenderness.      Hernia: No hernia is present.      Comments: Bowel sounds decreased  On rectal tube   Genitourinary:     General: Normal vulva.   Musculoskeletal:      Cervical back: Full passive range of motion without pain, normal range of motion and neck supple. No rigidity. No muscular tenderness.      Lumbar back: Tenderness present. Decreased range of motion.      Right lower leg: No edema.      Left lower leg: No edema.   Skin:     General: Skin is warm and dry.      Capillary Refill: Capillary refill takes less than 2 seconds.      Coloration: Skin is not cyanotic or pale.      Findings: No abrasion or bruising.   Neurological:      General: No focal deficit present.      Mental Status: She is alert and oriented to person, place, and time. Mental status is at baseline.      Cranial Nerves: No cranial nerve  deficit.      Sensory: No sensory deficit.      Motor: Motor function is intact.   Psychiatric:         Attention and Perception: Attention and perception normal.         Mood and Affect: Mood normal.         Speech: Speech normal.         Behavior: Behavior normal. Behavior is cooperative.         Thought Content: Thought content normal.         Cognition and Memory: Cognition and memory normal.         Judgment: Judgment normal.         Fluids    Intake/Output Summary (Last 24 hours) at 11/28/2021 1204  Last data filed at 11/28/2021 0800  Gross per 24 hour   Intake 190.83 ml   Output 800 ml   Net -609.17 ml       Laboratory  Recent Labs     11/26/21  0206 11/27/21 0417   WBC 7.9 8.8   RBC 4.40 3.81*   HEMOGLOBIN 14.2 11.9*   HEMATOCRIT 40.7 35.3*   MCV 92.5 92.7   MCH 32.3 31.2   MCHC 34.9 33.7   RDW 40.3 40.3   PLATELETCT 534* 471*   MPV 9.3 9.1     Recent Labs     11/26/21 0206 11/27/21 0417 11/28/21  0335   SODIUM 131* 131* 129*   POTASSIUM 3.1* 3.3* 3.5*   CHLORIDE 94* 95* 96   CO2 24 22 23   GLUCOSE 178* 173* 132*   BUN 17 30* 14   CREATININE 0.35* 0.41* 0.32*   CALCIUM 9.4 8.4 8.5                   Imaging  GU-[id number]-8 VIEW   Final Result      No evidence of bowel obstruction.      RN-OFQGEEZ-5 VIEW   Final Result      Improving bowel gas pattern. Nonobstructive pattern.      LW-GAOCLSU-5 VIEW   Final Result      No significant interval change in gaseous bowel dilatation.      SO-LMVBRSM-8 VIEW   Final Result      Gaseous colonic distention appears mildly improved.      DX-CHEST-PORTABLE (1 VIEW)   Final Result      No acute cardiac or pulmonary abnormalities are identified.      MX-XGARFKS-0 VIEW   Final Result      Persistent dilation of bowel predominately effecting the colon extending down to the rectum likely due to dysmotility or Aurora syndrome.      Right colon again dilated up to 15 cm.      HH-GSSLBIB-6 VIEW   Final Result      1.  Again seen dilated small and large intestine.      2.  NG tube  tip located just below the gastroesophageal junction.      DX-ABDOMEN FOR TUBE PLACEMENT   Final Result      Enteric tube tip projects over the stomach.      US-RUQ   Final Result      1.  Sludge and gallstones within the gallbladder. No evidence of biliary ductal dilatation.      2.  The liver is echogenic consistent with fatty change versus hepatocellular dysfunction.      DX-ABDOMEN FOR TUBE PLACEMENT   Final Result      Interval advancement of a nasogastric tube with the tip now overlying the expected location of the gastric body.      DX-ABDOMEN FOR TUBE PLACEMENT   Final Result      NG tube tip located at or just below the gastroesophageal junction. Advancement is recommended.      The report was called to the patient's nurse at 12:13 PM on 11/22/2021      CT-ABDOMEN-PELVIS W/O   Final Result      1.  Dilated small and large bowel loops with no findings consistent with mechanical obstruction identified.      2.  Diverticulosis of the distal colon without evidence of diverticulitis.      3.  Right lower quadrant mesenteric fat stranding which could indicate mild inflammatory changes nonspecific. No bowel wall thickening. No free fluid. No free air.      4.  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      5.  Punctate nonobstructing right renal stones. No hydronephrosis.      6.  Cholelithiasis.      7.  Hepatic steatosis.      8.  Bilateral lung base atelectasis/mild bronchiectasis. Trace right basilar pleural effusion.      DX-CHEST-PORTABLE (1 VIEW)   Final Result      No acute cardiopulmonary abnormality identified.      DS-BONE DENSITY STUDY (DEXA)    (Results Pending)        Assessment/Plan  * Taryn syndrome  Assessment & Plan  - Previously had NG tube for decompression, Relistor, bowel regimen etc.  Was sent to the ICU for Neostigmine x 1 11/27 with subsequent bowel movements and radiographic resolution  - OOB  - Avoid narcotics - utilizing Skelaxin and lidoderm  for MSK pain along with PRN Tylenol  - Secondary to lumbar compression fracture and narcotics  - GI on board  - BID Miralax          Type 2 diabetes mellitus without complication, without long-term current use of insulin (HCC)  Assessment & Plan  - On metformin and glipizide at home - is on Metformin here, will check SSI/fingersticks now that she is no longer NPO      Fall  Assessment & Plan  PT/OT  Fall precautions  SNF placement    Hypomagnesemia  Assessment & Plan  - Start BID scheduled Magnesium, may aid with GI transit as well    Hypophosphatemia  Assessment & Plan  Replace as indicated    Hypokalemia  Assessment & Plan  - Phos on the low end at 2.5 as well, will give a KPhos jump   - Replace Mag    Leukocytosis  Assessment & Plan  Secondary to bowel obstruction patient has leukocytosis  Resolved    Cancer (HCC)  Assessment & Plan  Patient reports history of right-sided breast cancer status post mastectomy and this is in remission.  Radiation had diagnosis initially 18 years ago first and then did have a recurrence and now she is status post radiation and surgical intervention      Hypertension  Assessment & Plan  Monitor blood pressure and adjust medications to keep systolic blood pressure less than 140 diastolic under 90.  Continue with Norvasc 5 mg daily  Increase Metoprolol XL to 50mg      Closed compression fracture of body of L1 vertebra (HCC)  Assessment & Plan  Outpatient referral to Neurosurgery is in place  Avoid narcotics with Kenly's syndrome - Skelaxin, Tylenol and Lidoderm  Multimodal pain managements  PT/OT rec SNF placement, referred - has a bed at [facility], watching pt tomorrow with first day of regular diet, can likely go to [facility] tomorrow. Will check a COVID tomorrow for placement as well.     Hyponatremia  Assessment & Plan  Change fluids to NS with K+ supplementation         VTE prophylaxis: enoxaparin ppx    I have performed a physical exam and reviewed and updated ROS and Plan today  (11/28/2021). In review of yesterday's note (11/27/2021), there are no changes except as documented above.

## 2021-11-28 NOTE — CARE PLAN
The patient is Stable - Low risk of patient condition declining or worsening    Shift Goals  Clinical Goals: Pain control  Patient Goals:  (control pain)  Family Goals: Up for meals    Progress made toward(s) clinical / shift goals:        Problem: Pain - Standard  Goal: Alleviation of pain or a reduction in pain to the patient’s comfort goal  Outcome: Progressing     Problem: Knowledge Deficit - Standard  Goal: Patient and family/care givers will demonstrate understanding of plan of care, disease process/condition, diagnostic tests and medications  Outcome: Progressing     Problem: Fall Risk  Goal: Patient will remain free from falls  Outcome: Progressing     Problem: Skin Integrity  Goal: Skin integrity is maintained or improved  Outcome: Progressing     Problem: Bowel Elimination  Goal: Establish and maintain regular bowel function  Outcome: Progressing

## 2021-11-29 ENCOUNTER — APPOINTMENT (OUTPATIENT)
Dept: RADIOLOGY | Facility: MEDICAL CENTER | Age: 76
DRG: 552 | End: 2021-11-29
Attending: FAMILY MEDICINE
Payer: MEDICARE

## 2021-11-29 LAB
ANION GAP SERPL CALC-SCNC: 10 MMOL/L (ref 7–16)
BASOPHILS # BLD AUTO: 0.7 % (ref 0–1.8)
BASOPHILS # BLD: 0.09 K/UL (ref 0–0.12)
BUN SERPL-MCNC: 8 MG/DL (ref 8–22)
CALCIUM SERPL-MCNC: 9.1 MG/DL (ref 8.4–10.2)
CHLORIDE SERPL-SCNC: 97 MMOL/L (ref 96–112)
CO2 SERPL-SCNC: 25 MMOL/L (ref 20–33)
CREAT SERPL-MCNC: 0.37 MG/DL (ref 0.5–1.4)
EOSINOPHIL # BLD AUTO: 0.32 K/UL (ref 0–0.51)
EOSINOPHIL NFR BLD: 2.6 % (ref 0–6.9)
ERYTHROCYTE [DISTWIDTH] IN BLOOD BY AUTOMATED COUNT: 42.5 FL (ref 35.9–50)
FLUAV RNA SPEC QL NAA+PROBE: NEGATIVE
FLUBV RNA SPEC QL NAA+PROBE: NEGATIVE
GLUCOSE BLD-MCNC: 153 MG/DL (ref 65–99)
GLUCOSE BLD-MCNC: 156 MG/DL (ref 65–99)
GLUCOSE BLD-MCNC: 173 MG/DL (ref 65–99)
GLUCOSE BLD-MCNC: 243 MG/DL (ref 65–99)
GLUCOSE SERPL-MCNC: 155 MG/DL (ref 65–99)
HCT VFR BLD AUTO: 33.2 % (ref 37–47)
HGB BLD-MCNC: 11 G/DL (ref 12–16)
IMM GRANULOCYTES # BLD AUTO: 0.3 K/UL (ref 0–0.11)
IMM GRANULOCYTES NFR BLD AUTO: 2.5 % (ref 0–0.9)
LYMPHOCYTES # BLD AUTO: 2.35 K/UL (ref 1–4.8)
LYMPHOCYTES NFR BLD: 19.4 % (ref 22–41)
MAGNESIUM SERPL-MCNC: 1.5 MG/DL (ref 1.5–2.5)
MCH RBC QN AUTO: 31.8 PG (ref 27–33)
MCHC RBC AUTO-ENTMCNC: 33.1 G/DL (ref 33.6–35)
MCV RBC AUTO: 96 FL (ref 81.4–97.8)
MONOCYTES # BLD AUTO: 0.99 K/UL (ref 0–0.85)
MONOCYTES NFR BLD AUTO: 8.2 % (ref 0–13.4)
NEUTROPHILS # BLD AUTO: 8.06 K/UL (ref 2–7.15)
NEUTROPHILS NFR BLD: 66.6 % (ref 44–72)
NRBC # BLD AUTO: 0.04 K/UL
NRBC BLD-RTO: 0.3 /100 WBC
NT-PROBNP SERPL IA-MCNC: 992 PG/ML (ref 0–125)
PHOSPHATE SERPL-MCNC: 2.7 MG/DL (ref 2.5–4.5)
PLATELET # BLD AUTO: 469 K/UL (ref 164–446)
PMV BLD AUTO: 9.2 FL (ref 9–12.9)
POTASSIUM SERPL-SCNC: 3.5 MMOL/L (ref 3.6–5.5)
PROCALCITONIN SERPL-MCNC: 29.72 NG/ML
RBC # BLD AUTO: 3.46 M/UL (ref 4.2–5.4)
RSV RNA SPEC QL NAA+PROBE: NEGATIVE
SARS-COV-2 RNA RESP QL NAA+PROBE: NOTDETECTED
SODIUM SERPL-SCNC: 132 MMOL/L (ref 135–145)
SPECIMEN SOURCE: NORMAL
WBC # BLD AUTO: 12.1 K/UL (ref 4.8–10.8)

## 2021-11-29 PROCEDURE — 700111 HCHG RX REV CODE 636 W/ 250 OVERRIDE (IP): Performed by: INTERNAL MEDICINE

## 2021-11-29 PROCEDURE — A9270 NON-COVERED ITEM OR SERVICE: HCPCS | Performed by: STUDENT IN AN ORGANIZED HEALTH CARE EDUCATION/TRAINING PROGRAM

## 2021-11-29 PROCEDURE — A9270 NON-COVERED ITEM OR SERVICE: HCPCS | Performed by: FAMILY MEDICINE

## 2021-11-29 PROCEDURE — 71045 X-RAY EXAM CHEST 1 VIEW: CPT

## 2021-11-29 PROCEDURE — 700102 HCHG RX REV CODE 250 W/ 637 OVERRIDE(OP): Performed by: STUDENT IN AN ORGANIZED HEALTH CARE EDUCATION/TRAINING PROGRAM

## 2021-11-29 PROCEDURE — 770020 HCHG ROOM/CARE - TELE (206)

## 2021-11-29 PROCEDURE — 83880 ASSAY OF NATRIURETIC PEPTIDE: CPT

## 2021-11-29 PROCEDURE — 700102 HCHG RX REV CODE 250 W/ 637 OVERRIDE(OP): Performed by: FAMILY MEDICINE

## 2021-11-29 PROCEDURE — 83735 ASSAY OF MAGNESIUM: CPT

## 2021-11-29 PROCEDURE — 700111 HCHG RX REV CODE 636 W/ 250 OVERRIDE (IP): Performed by: FAMILY MEDICINE

## 2021-11-29 PROCEDURE — 0241U HCHG SARS-COV-2 COVID-19 NFCT DS RESP RNA 4 TRGT MIC: CPT

## 2021-11-29 PROCEDURE — 700101 HCHG RX REV CODE 250: Performed by: STUDENT IN AN ORGANIZED HEALTH CARE EDUCATION/TRAINING PROGRAM

## 2021-11-29 PROCEDURE — 84100 ASSAY OF PHOSPHORUS: CPT

## 2021-11-29 PROCEDURE — 700102 HCHG RX REV CODE 250 W/ 637 OVERRIDE(OP): Performed by: INTERNAL MEDICINE

## 2021-11-29 PROCEDURE — 84145 PROCALCITONIN (PCT): CPT

## 2021-11-29 PROCEDURE — 82962 GLUCOSE BLOOD TEST: CPT

## 2021-11-29 PROCEDURE — 80048 BASIC METABOLIC PNL TOTAL CA: CPT

## 2021-11-29 PROCEDURE — 85025 COMPLETE CBC W/AUTO DIFF WBC: CPT

## 2021-11-29 PROCEDURE — C9803 HOPD COVID-19 SPEC COLLECT: HCPCS | Performed by: FAMILY MEDICINE

## 2021-11-29 PROCEDURE — 99232 SBSQ HOSP IP/OBS MODERATE 35: CPT | Performed by: FAMILY MEDICINE

## 2021-11-29 PROCEDURE — A9270 NON-COVERED ITEM OR SERVICE: HCPCS | Performed by: INTERNAL MEDICINE

## 2021-11-29 RX ORDER — MAGNESIUM SULFATE HEPTAHYDRATE 40 MG/ML
2 INJECTION, SOLUTION INTRAVENOUS ONCE
Status: COMPLETED | OUTPATIENT
Start: 2021-11-29 | End: 2021-11-29

## 2021-11-29 RX ADMIN — METAXALONE 800 MG: 800 TABLET ORAL at 17:27

## 2021-11-29 RX ADMIN — AMLODIPINE BESYLATE 5 MG: 5 TABLET ORAL at 20:04

## 2021-11-29 RX ADMIN — Medication 400 MG: at 17:27

## 2021-11-29 RX ADMIN — DIBASIC SODIUM PHOSPHATE, MONOBASIC POTASSIUM PHOSPHATE AND MONOBASIC SODIUM PHOSPHATE 250 MG: 852; 155; 130 TABLET ORAL at 11:35

## 2021-11-29 RX ADMIN — LIDOCAINE 1 PATCH: 50 PATCH TOPICAL at 05:56

## 2021-11-29 RX ADMIN — METFORMIN HYDROCHLORIDE 500 MG: 500 TABLET, EXTENDED RELEASE ORAL at 08:51

## 2021-11-29 RX ADMIN — DIBASIC SODIUM PHOSPHATE, MONOBASIC POTASSIUM PHOSPHATE AND MONOBASIC SODIUM PHOSPHATE 250 MG: 852; 155; 130 TABLET ORAL at 23:32

## 2021-11-29 RX ADMIN — ENOXAPARIN SODIUM 40 MG: 40 INJECTION SUBCUTANEOUS at 05:56

## 2021-11-29 RX ADMIN — METAXALONE 800 MG: 800 TABLET ORAL at 11:36

## 2021-11-29 RX ADMIN — DIBASIC SODIUM PHOSPHATE, MONOBASIC POTASSIUM PHOSPHATE AND MONOBASIC SODIUM PHOSPHATE 250 MG: 852; 155; 130 TABLET ORAL at 08:51

## 2021-11-29 RX ADMIN — Medication 400 MG: at 05:56

## 2021-11-29 RX ADMIN — METFORMIN HYDROCHLORIDE 500 MG: 500 TABLET, EXTENDED RELEASE ORAL at 17:27

## 2021-11-29 RX ADMIN — METOPROLOL SUCCINATE 50 MG: 25 TABLET, EXTENDED RELEASE ORAL at 05:56

## 2021-11-29 RX ADMIN — ACETAMINOPHEN 650 MG: 325 TABLET, FILM COATED ORAL at 17:27

## 2021-11-29 RX ADMIN — DONEPEZIL HYDROCHLORIDE 5 MG: 5 TABLET, FILM COATED ORAL at 20:04

## 2021-11-29 RX ADMIN — MAGNESIUM SULFATE 2 G: 2 INJECTION INTRAVENOUS at 08:55

## 2021-11-29 RX ADMIN — DIBASIC SODIUM PHOSPHATE, MONOBASIC POTASSIUM PHOSPHATE AND MONOBASIC SODIUM PHOSPHATE 250 MG: 852; 155; 130 TABLET ORAL at 17:28

## 2021-11-29 RX ADMIN — METAXALONE 800 MG: 800 TABLET ORAL at 05:56

## 2021-11-29 RX ADMIN — INSULIN HUMAN 2 UNITS: 100 INJECTION, SOLUTION PARENTERAL at 11:33

## 2021-11-29 ASSESSMENT — ENCOUNTER SYMPTOMS
CARDIOVASCULAR NEGATIVE: 1
MEMORY LOSS: 1
FEVER: 0
COUGH: 0
VOMITING: 0
DIARRHEA: 0
HEADACHES: 0
NEUROLOGICAL NEGATIVE: 1
NERVOUS/ANXIOUS: 0
MYALGIAS: 1
WHEEZING: 0
BRUISES/BLEEDS EASILY: 0
FOCAL WEAKNESS: 0
CHILLS: 0
DOUBLE VISION: 0
DEPRESSION: 0
SEIZURES: 0
DIAPHORESIS: 0
HEMOPTYSIS: 0
CONSTIPATION: 0
BACK PAIN: 1
EYES NEGATIVE: 1
ABDOMINAL PAIN: 0
PALPITATIONS: 0
LOSS OF CONSCIOUSNESS: 0
NAUSEA: 0
NECK PAIN: 1
BLOOD IN STOOL: 0
HEARTBURN: 0
DIZZINESS: 0
RESPIRATORY NEGATIVE: 1

## 2021-11-29 ASSESSMENT — VISUAL ACUITY: OU: 1

## 2021-11-29 ASSESSMENT — PAIN DESCRIPTION - PAIN TYPE: TYPE: ACUTE PAIN

## 2021-11-29 ASSESSMENT — LIFESTYLE VARIABLES: SUBSTANCE_ABUSE: 0

## 2021-11-29 NOTE — DISCHARGE PLANNING
Anticipated Discharge Disposition:   [facility] when medically cleared    Action:   Chart review complete.     Discussed patient's plan of care and plans for discharge during rounds. Patient to order chest xray and urinalysis. GI to see patient.     MD anticipates discharge back to [facility] tomorrow. COVID test negative.     RN CM will continue to follow.     Barriers to Discharge:   Medical Clearance  [facility] bed availability     Plan:   Hospital care management will continue to assist with discharge planning needs.

## 2021-11-29 NOTE — PROGRESS NOTES
Telemetry Shift Summary     Rhythm: SR/ST with BBB  HR:   Ectopy: r PAC    Measurements: 0.16/0.12/0.34    Normal Values  Rhythm: SR  HR:   Measurements: 0.12-0.20/0.08-0.10/0.30-0.52

## 2021-11-29 NOTE — PROGRESS NOTES
[facility] Medicine Daily Progress Note    Date of Service  11/29/2021    Chief Complaint  Stacy Kelley is a 76 y.o. female admitted 11/21/2021 with low back pain secondary to ground-level fall    Hospital Course  Stacy Kelley is a 76 y.o. female who lives in [place] with her  who himself has multiple medical problems.  Patient apparently fell on Tuesday of last week and did not tell the family.  The pain persisted and finally the daughter recognized the situation and brought her to [facility] for evaluation.  Here then she took her to urgent care where she was evaluated imaging studies and recommendation was to get her an MRI.  They were able to complete the MRI 11/21 and this shows an acute L1 compression fracture thus she was brought to the hospital for continued management and evaluation.  Subsequently was found that the patient was also hyponatremic on admision.      The patient was noted to have nausea, vomiting. CT abd noted Dilated small and large bowel loops with no findings consistent with mechanical obstruction identified. NG tube was placed. Patient had bowel movements 11/23. NG tube output decreased. NG discontinued on 11/24. Still distended, repeat KUB showing dilated colon, right colon dilated up to 15cm. GI consulted, rectal tube placed.      Interval Problem Update  11/28 - Pt received Neostigmine x 1 yesterday with subsequent positive stooling and gas; repeat KUB with resolved dilation. Pt states she feels more comfortable today, had FLD for breakfast and did well. Bps are elevated, will adjust medications as this has been the case for a couple of days intermittently. Will plan for FLD today, regular diet tomorrow and SNF on Tuesday if she tolerates a regular diet.     11/29 - Pt tolerated FLD overnight, discussed with GI - advance to GI softs and monitor overnight prior to discharging to SNF.  Bps better with med adjustment yesterday. White count up today, had negative UA on the 21st  and no urinary symptoms, mild hypoxia on room air, will check a CXR.  Sounds like she has some extra fluid on her - will hold IVFs and check a BNP.     I have personally seen and examined the patient at bedside. I discussed the plan of care with patient, family, bedside RN, charge RN,  and pharmacy.    Consultants/Specialty  GI  Critical Care     Code Status   Full Code    Disposition  Patient is medically cleared pending demonstrated tolerance of GI soft diet.   Anticipate discharge to to skilled nursing facility.  I have placed the appropriate orders for post-discharge needs.    Review of Systems  Review of Systems   Constitutional: Positive for malaise/fatigue. Negative for chills, diaphoresis and fever.   HENT: Negative.    Eyes: Negative.  Negative for double vision.   Respiratory: Negative.  Negative for cough, hemoptysis and wheezing.    Cardiovascular: Negative.  Negative for chest pain, palpitations and leg swelling.   Gastrointestinal: Negative for abdominal pain, blood in stool, constipation, diarrhea, heartburn, nausea and vomiting.   Genitourinary: Negative.  Negative for frequency, hematuria and urgency.   Musculoskeletal: Positive for back pain, myalgias and neck pain. Negative for joint pain.   Skin: Negative.  Negative for itching and rash.   Neurological: Negative.  Negative for dizziness, focal weakness, seizures, loss of consciousness and headaches.   Endo/Heme/Allergies: Negative.  Does not bruise/bleed easily.   Psychiatric/Behavioral: Positive for memory loss. Negative for depression, substance abuse and suicidal ideas. The patient is not nervous/anxious.    All other systems reviewed and are negative.       Physical Exam  Temp:  [36.7 °C (98.1 °F)-37.2 °C (98.9 °F)] 36.9 °C (98.4 °F)  Pulse:  [] 93  Resp:  [18-31] 18  BP: (118-170)/(62-98) 162/72  SpO2:  [88 %-97 %] 90 %    Physical Exam  Vitals and nursing note reviewed. Exam conducted with a chaperone present.    Constitutional:       General: She is awake.      Appearance: Normal appearance. She is well-developed, well-groomed and normal weight.   HENT:      Head: Normocephalic and atraumatic.      Jaw: There is normal jaw occlusion. No trismus.      Salivary Glands: Right salivary gland is not tender. Left salivary gland is not tender.      Right Ear: External ear normal.      Left Ear: External ear normal.      Mouth/Throat:      Mouth: Mucous membranes are dry.      Pharynx: Oropharynx is clear.   Eyes:      General: Lids are normal. Vision grossly intact.      Extraocular Movements: Extraocular movements intact.      Conjunctiva/sclera: Conjunctivae normal.      Right eye: Right conjunctiva is not injected. No exudate.     Left eye: Left conjunctiva is not injected. No exudate.     Pupils: Pupils are equal, round, and reactive to light.   Neck:      Thyroid: No thyroid mass.      Vascular: No hepatojugular reflux or JVD.      Trachea: No abnormal tracheal secretions or tracheal deviation.   Cardiovascular:      Rate and Rhythm: Normal rate and regular rhythm. Occasional extrasystoles are present.     Pulses: Normal pulses.      Heart sounds: Normal heart sounds. No murmur heard.  No friction rub.   Pulmonary:      Effort: Pulmonary effort is normal.      Breath sounds: No wheezing or rhonchi.   Abdominal:      General: Abdomen is flat. Bowel sounds are normal. There is no distension.      Palpations: Abdomen is soft.      Tenderness: There is no abdominal tenderness (improving). There is no right CVA tenderness or left CVA tenderness.      Hernia: No hernia is present.      Comments: Bowel sounds decreased  On rectal tube   Genitourinary:     General: Normal vulva.   Musculoskeletal:      Cervical back: Full passive range of motion without pain, normal range of motion and neck supple. No rigidity. No muscular tenderness.      Lumbar back: Tenderness present. Decreased range of motion.      Right lower leg: No edema.       Left lower leg: No edema.   Skin:     General: Skin is warm and dry.      Capillary Refill: Capillary refill takes less than 2 seconds.      Coloration: Skin is not cyanotic or pale.      Findings: No abrasion or bruising.   Neurological:      General: No focal deficit present.      Mental Status: She is alert and oriented to person, place, and time. Mental status is at baseline.      Cranial Nerves: No cranial nerve deficit.      Sensory: No sensory deficit.      Motor: Motor function is intact.   Psychiatric:         Attention and Perception: Attention and perception normal.         Mood and Affect: Mood normal.         Speech: Speech normal.         Behavior: Behavior normal. Behavior is cooperative.         Thought Content: Thought content normal.         Cognition and Memory: Cognition and memory normal.         Judgment: Judgment normal.         Fluids    Intake/Output Summary (Last 24 hours) at 11/29/2021 0704  Last data filed at 11/28/2021 1737  Gross per 24 hour   Intake 249.9 ml   Output 1075 ml   Net -825.1 ml       Laboratory  Recent Labs     11/27/21  0417 11/29/21  0520   WBC 8.8 12.1*   RBC 3.81* 3.46*   HEMOGLOBIN 11.9* 11.0*   HEMATOCRIT 35.3* 33.2*   MCV 92.7 96.0   MCH 31.2 31.8   MCHC 33.7 33.1*   RDW 40.3 42.5   PLATELETCT 471* 469*   MPV 9.1 9.2     Recent Labs     11/27/21  0417 11/28/21  0335   SODIUM 131* 129*   POTASSIUM 3.3* 3.5*   CHLORIDE 95* 96   CO2 22 23   GLUCOSE 173* 132*   BUN 30* 14   CREATININE 0.41* 0.32*   CALCIUM 8.4 8.5                   Imaging  [id number] VIEW   Final Result      No evidence of bowel obstruction.      WD-NVMHIYZ-4 VIEW   Final Result      Improving bowel gas pattern. Nonobstructive pattern.      [id number]-8 VIEW   Final Result      No significant interval change in gaseous bowel dilatation.      MB-CPZOILL-3 VIEW   Final Result      Gaseous colonic distention appears mildly improved.      DX-CHEST-PORTABLE (1 VIEW)   Final Result      No acute  cardiac or pulmonary abnormalities are identified.      [id number]-6 VIEW   Final Result      Persistent dilation of bowel predominately effecting the colon extending down to the rectum likely due to dysmotility or [place] syndrome.      Right colon again dilated up to 15 cm.      LI-YIQNBGC-1 VIEW   Final Result      1.  Again seen dilated small and large intestine.      2.  NG tube tip located just below the gastroesophageal junction.      DX-ABDOMEN FOR TUBE PLACEMENT   Final Result      Enteric tube tip projects over the stomach.      US-RUQ   Final Result      1.  Sludge and gallstones within the gallbladder. No evidence of biliary ductal dilatation.      2.  The liver is echogenic consistent with fatty change versus hepatocellular dysfunction.      DX-ABDOMEN FOR TUBE PLACEMENT   Final Result      Interval advancement of a nasogastric tube with the tip now overlying the expected location of the gastric body.      DX-ABDOMEN FOR TUBE PLACEMENT   Final Result      NG tube tip located at or just below the gastroesophageal junction. Advancement is recommended.      The report was called to the patient's nurse at 12:13 PM on 11/22/2021      CT-ABDOMEN-PELVIS W/O   Final Result      1.  Dilated small and large bowel loops with no findings consistent with mechanical obstruction identified.      2.  Diverticulosis of the distal colon without evidence of diverticulitis.      3.  Right lower quadrant mesenteric fat stranding which could indicate mild inflammatory changes nonspecific. No bowel wall thickening. No free fluid. No free air.      4.  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      5.  Punctate nonobstructing right renal stones. No hydronephrosis.      6.  Cholelithiasis.      7.  Hepatic steatosis.      8.  Bilateral lung base atelectasis/mild bronchiectasis. Trace right basilar pleural effusion.      DX-CHEST-PORTABLE (1 VIEW)   Final Result      No acute  cardiopulmonary abnormality identified.      DS-BONE DENSITY STUDY (DEXA)    (Results Pending)        Assessment/Plan  * [place] syndrome  Assessment & Plan  - Previously had NG tube for decompression, Relistor, bowel regimen etc.  Was sent to the ICU for Neostigmine x 1 11/27 with subsequent bowel movements and radiographic resolution  - OOB  - Avoid narcotics - utilizing Skelaxin and lidoderm for MSK pain along with PRN Tylenol  - Secondary to lumbar compression fracture and narcotics  - GI on board  - BID Miralax  - Transitioning to GI soft diet today, SNF in the am if she tolerates           Type 2 diabetes mellitus without complication, without long-term current use of insulin (HCC)  Assessment & Plan  - On metformin and glipizide at home - is on Metformin here, will check SSI/fingersticks now that she is no longer NPO      Fall  Assessment & Plan  PT/OT  Fall precautions  SNF placement    Hypomagnesemia  Assessment & Plan  - Continue BID scheduled Magnesium, may aid with GI transit as well  - Give 2gm jump today     Hypophosphatemia  Assessment & Plan  Start daily replacement with Kphos given concurrent hypokalemia     Hypokalemia  Assessment & Plan  - Start scheduled KPhos   - Replace Mag    Leukocytosis  Assessment & Plan  UA and CXR both negative earlier during hospitalization  SBO resolved, should not be contributing  Check procalcitonin and CXR given mild hypoxia    Cancer (HCC)  Assessment & Plan  Patient reports history of right-sided breast cancer status post mastectomy and this is in remission.  Radiation had diagnosis initially 18 years ago first and then did have a recurrence and now she is status post radiation and surgical intervention      Hypertension  Assessment & Plan  Monitor blood pressure and adjust medications to keep systolic blood pressure less than 140 diastolic under 90.  Continue with Norvasc 5 mg daily  Increased Metoprolol XL yesterday to 50mg with good effect       Closed  compression fracture of body of L1 vertebra (HCC)  Assessment & Plan  Outpatient referral to Neurosurgery is in place  Avoid narcotics with Taryn's syndrome - Skelaxin, Tylenol and Lidoderm  Multimodal pain managements  PT/OT rec SNF placement, referred - has a bed at [facility], watching pt today with first day of regular diet per GI, can likely go to [facility] tomorrow. Will check a COVID today for placement as well.     Hyponatremia  Assessment & Plan  Changed fluids to NS with K+ supplementation with some improvement overnight - has some rales on exam today - pt states she is very hungry and able to eat and drink, will hold IVFs for now.         VTE prophylaxis: enoxaparin ppx    I have performed a physical exam and reviewed and updated ROS and Plan today (11/29/2021). In review of yesterday's note (11/28/2021), there are no changes except as documented above.

## 2021-11-29 NOTE — DISCHARGE PLANNING
Anticipated Discharge Disposition: SNF- [facility]     Action: LSW requested by Dr. Harrison during IDT rounds on 11/28 to f/u with [facility] in regards to kyphoplasty procedure. Dr. Harrison informed LSW that they were requesting appointment be scheduled prior to pt transferring to SNF. LSW called Miriam with [facility] today due to [facility] admissions not being available during the weekend. LSW informed that it would be ideal for pt have kyphoplasty at the hospital before transferring and if f/u needed, [facility] with ensure pt attends f/u appointment.     LSW informed Miriam that LSW would clarify during IDT rounds.     Barriers to Discharge: None     Plan: LSW to attend IDT rounds, LSW to continue to follow and assist with d/c needs     Addendum 1338  LSW informed in IDT rounds that pt can transfer to SNF and when d/c's from SNF, pt can f/u with kyphoplasty referral.   LSW called Miriam with [facility] and provided update above. Miriam aware that pt anticipated to d/c tomorrow. ADL flowsheet showing that pt is using a front wheel walker. Miriam will call back tomorrow 11/30 morning with transport time for pt.     LSW provided update to Dr. Harrison.

## 2021-11-30 VITALS
RESPIRATION RATE: 18 BRPM | TEMPERATURE: 98.2 F | SYSTOLIC BLOOD PRESSURE: 143 MMHG | DIASTOLIC BLOOD PRESSURE: 85 MMHG | HEIGHT: 64 IN | WEIGHT: 205.03 LBS | BODY MASS INDEX: 35 KG/M2 | HEART RATE: 100 BPM | OXYGEN SATURATION: 90 %

## 2021-11-30 LAB
ANION GAP SERPL CALC-SCNC: 10 MMOL/L (ref 7–16)
BASOPHILS # BLD AUTO: 0.8 % (ref 0–1.8)
BASOPHILS # BLD: 0.08 K/UL (ref 0–0.12)
BUN SERPL-MCNC: 7 MG/DL (ref 8–22)
CALCIUM SERPL-MCNC: 8.8 MG/DL (ref 8.4–10.2)
CHLORIDE SERPL-SCNC: 99 MMOL/L (ref 96–112)
CO2 SERPL-SCNC: 25 MMOL/L (ref 20–33)
CREAT SERPL-MCNC: 0.41 MG/DL (ref 0.5–1.4)
EOSINOPHIL # BLD AUTO: 0.36 K/UL (ref 0–0.51)
EOSINOPHIL NFR BLD: 3.6 % (ref 0–6.9)
ERYTHROCYTE [DISTWIDTH] IN BLOOD BY AUTOMATED COUNT: 43 FL (ref 35.9–50)
GLUCOSE BLD-MCNC: 141 MG/DL (ref 65–99)
GLUCOSE BLD-MCNC: 172 MG/DL (ref 65–99)
GLUCOSE SERPL-MCNC: 158 MG/DL (ref 65–99)
HCT VFR BLD AUTO: 33.3 % (ref 37–47)
HGB BLD-MCNC: 11.2 G/DL (ref 12–16)
IMM GRANULOCYTES # BLD AUTO: 0.23 K/UL (ref 0–0.11)
IMM GRANULOCYTES NFR BLD AUTO: 2.3 % (ref 0–0.9)
LYMPHOCYTES # BLD AUTO: 2.34 K/UL (ref 1–4.8)
LYMPHOCYTES NFR BLD: 23.6 % (ref 22–41)
MAGNESIUM SERPL-MCNC: 1.6 MG/DL (ref 1.5–2.5)
MCH RBC QN AUTO: 32.2 PG (ref 27–33)
MCHC RBC AUTO-ENTMCNC: 33.6 G/DL (ref 33.6–35)
MCV RBC AUTO: 95.7 FL (ref 81.4–97.8)
MONOCYTES # BLD AUTO: 0.73 K/UL (ref 0–0.85)
MONOCYTES NFR BLD AUTO: 7.4 % (ref 0–13.4)
NEUTROPHILS # BLD AUTO: 6.16 K/UL (ref 2–7.15)
NEUTROPHILS NFR BLD: 62.3 % (ref 44–72)
NRBC # BLD AUTO: 0.04 K/UL
NRBC BLD-RTO: 0.4 /100 WBC
PHOSPHATE SERPL-MCNC: 3.5 MG/DL (ref 2.5–4.5)
PLATELET # BLD AUTO: 486 K/UL (ref 164–446)
PMV BLD AUTO: 8.6 FL (ref 9–12.9)
POTASSIUM SERPL-SCNC: 3.3 MMOL/L (ref 3.6–5.5)
RBC # BLD AUTO: 3.48 M/UL (ref 4.2–5.4)
SODIUM SERPL-SCNC: 134 MMOL/L (ref 135–145)
WBC # BLD AUTO: 9.9 K/UL (ref 4.8–10.8)

## 2021-11-30 PROCEDURE — 99239 HOSP IP/OBS DSCHRG MGMT >30: CPT | Performed by: HOSPITALIST

## 2021-11-30 PROCEDURE — 700111 HCHG RX REV CODE 636 W/ 250 OVERRIDE (IP): Performed by: INTERNAL MEDICINE

## 2021-11-30 PROCEDURE — 700102 HCHG RX REV CODE 250 W/ 637 OVERRIDE(OP): Performed by: FAMILY MEDICINE

## 2021-11-30 PROCEDURE — 82962 GLUCOSE BLOOD TEST: CPT | Mod: 91

## 2021-11-30 PROCEDURE — 700101 HCHG RX REV CODE 250: Performed by: STUDENT IN AN ORGANIZED HEALTH CARE EDUCATION/TRAINING PROGRAM

## 2021-11-30 PROCEDURE — A9270 NON-COVERED ITEM OR SERVICE: HCPCS | Performed by: FAMILY MEDICINE

## 2021-11-30 PROCEDURE — 700102 HCHG RX REV CODE 250 W/ 637 OVERRIDE(OP): Performed by: NURSE PRACTITIONER

## 2021-11-30 PROCEDURE — A9270 NON-COVERED ITEM OR SERVICE: HCPCS | Performed by: INTERNAL MEDICINE

## 2021-11-30 PROCEDURE — 80048 BASIC METABOLIC PNL TOTAL CA: CPT

## 2021-11-30 PROCEDURE — 84100 ASSAY OF PHOSPHORUS: CPT

## 2021-11-30 PROCEDURE — 700102 HCHG RX REV CODE 250 W/ 637 OVERRIDE(OP): Performed by: INTERNAL MEDICINE

## 2021-11-30 PROCEDURE — 85025 COMPLETE CBC W/AUTO DIFF WBC: CPT

## 2021-11-30 PROCEDURE — A9270 NON-COVERED ITEM OR SERVICE: HCPCS | Performed by: NURSE PRACTITIONER

## 2021-11-30 PROCEDURE — 83735 ASSAY OF MAGNESIUM: CPT

## 2021-11-30 PROCEDURE — 700111 HCHG RX REV CODE 636 W/ 250 OVERRIDE (IP): Performed by: HOSPITALIST

## 2021-11-30 RX ORDER — POLYETHYLENE GLYCOL 3350 17 G/17G
17 POWDER, FOR SOLUTION ORAL 2 TIMES DAILY
Refills: 3 | Status: SHIPPED
Start: 2021-11-30 | End: 2022-01-18

## 2021-11-30 RX ORDER — LIDOCAINE 50 MG/G
1 PATCH TOPICAL EVERY 24 HOURS
Qty: 10 PATCH | Status: SHIPPED
Start: 2021-11-30 | End: 2022-01-07

## 2021-11-30 RX ORDER — LIDOCAINE 50 MG/G
1 PATCH TOPICAL DAILY
Qty: 10 PATCH | Status: SHIPPED
Start: 2021-12-01 | End: 2022-01-07

## 2021-11-30 RX ORDER — FUROSEMIDE 10 MG/ML
40 INJECTION INTRAMUSCULAR; INTRAVENOUS ONCE
Status: COMPLETED | OUTPATIENT
Start: 2021-11-30 | End: 2021-11-30

## 2021-11-30 RX ORDER — METAXALONE 800 MG/1
800 TABLET ORAL 3 TIMES DAILY
Qty: 90 TABLET | Status: SHIPPED
Start: 2021-11-30 | End: 2022-02-03 | Stop reason: SDUPTHER

## 2021-11-30 RX ADMIN — LIDOCAINE 1 PATCH: 50 PATCH TOPICAL at 12:03

## 2021-11-30 RX ADMIN — ENOXAPARIN SODIUM 40 MG: 40 INJECTION SUBCUTANEOUS at 06:16

## 2021-11-30 RX ADMIN — LIDOCAINE 1 PATCH: 50 PATCH TOPICAL at 06:15

## 2021-11-30 RX ADMIN — Medication 400 MG: at 06:15

## 2021-11-30 RX ADMIN — DIBASIC SODIUM PHOSPHATE, MONOBASIC POTASSIUM PHOSPHATE AND MONOBASIC SODIUM PHOSPHATE 250 MG: 852; 155; 130 TABLET ORAL at 06:16

## 2021-11-30 RX ADMIN — DIBASIC SODIUM PHOSPHATE, MONOBASIC POTASSIUM PHOSPHATE AND MONOBASIC SODIUM PHOSPHATE 250 MG: 852; 155; 130 TABLET ORAL at 12:04

## 2021-11-30 RX ADMIN — INSULIN HUMAN 1 UNITS: 100 INJECTION, SOLUTION PARENTERAL at 12:11

## 2021-11-30 RX ADMIN — FUROSEMIDE 40 MG: 40 INJECTION, SOLUTION INTRAMUSCULAR; INTRAVENOUS at 08:06

## 2021-11-30 RX ADMIN — METAXALONE 800 MG: 800 TABLET ORAL at 12:04

## 2021-11-30 RX ADMIN — METFORMIN HYDROCHLORIDE 500 MG: 500 TABLET, EXTENDED RELEASE ORAL at 08:06

## 2021-11-30 RX ADMIN — METOPROLOL SUCCINATE 50 MG: 25 TABLET, EXTENDED RELEASE ORAL at 06:16

## 2021-11-30 RX ADMIN — METAXALONE 800 MG: 800 TABLET ORAL at 06:15

## 2021-11-30 RX ADMIN — POLYETHYLENE GLYCOL 3350 1 PACKET: 17 POWDER, FOR SOLUTION ORAL at 06:15

## 2021-11-30 ASSESSMENT — PAIN DESCRIPTION - PAIN TYPE: TYPE: ACUTE PAIN

## 2021-11-30 NOTE — DISCHARGE SUMMARY
Discharge Summary    CHIEF COMPLAINT ON ADMISSION  Chief Complaint   Patient presents with   • T-5000 FALL   • Low Back Pain       Reason for Admission  Back Pain      Admission Date  11/21/2021    CODE STATUS  Full Code    HPI & HOSPITAL COURSE  Ms. Stacy Kelley is a 76-year-old female who comes to us from [place] where she has had weakness and multiple falls.  The patient suffered an L1 compression fracture and thus the daughter brought her to [facility] for evaluation.  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  The patient now as improved and is actually moving her bowel with good bowel movements.  Her debilitation however is extremely severe and thus she has been accepted at the skilled facility and she will need continued physical therapy and occupational therapy.  Patient also because of the L1 compression fracture will need kyphoplasty as an outpatient and this is being arranged by orthopedics.  For now patient is tolerating a diet.  She is tolerating oral medication ingestion.  Vitals and labs at this point are stable.  Recent chest x-ray shows no infiltrate.  Most recent abdominal examination reveals that the patient's bowels are working adequately.  With the patient needing PT OT the patient can discharge to skilled facility for ongoing therapies.    Therefore, she is discharged in good and stable condition to skilled nursing facility.    The patient met 2-midnight criteria for an inpatient stay at the time of discharge.    Discharge Date  11/30/2021    FOLLOW UP ITEMS POST DISCHARGE  Follow-up with the primary care physician after discharge from skilled facility    DISCHARGE DIAGNOSES  Principal Problem:    [place] syndrome POA: Unknown  Active Problems:    Closed compression fracture of body of L1 vertebra (HCC) POA: Unknown    Hyponatremia POA: Unknown     Leukocytosis POA: Unknown    Hypokalemia POA: Unknown    Hypophosphatemia POA: Unknown    Hypomagnesemia POA: Unknown    Fall POA: Unknown    Type 2 diabetes mellitus without complication, without long-term current use of insulin (HCC) POA: Unknown    Hypertension POA: Unknown      Overview: on metoprolol and amlodipine    Cancer (HCC) POA: Unknown      Overview: right breast cancer X 2   (1st time Dx 18 yrs ago , had radation and       lumpectomy)  Resolved Problems:    Acute midline low back pain without sciatica POA: Unknown    CN (constipation) POA: Unknown      FOLLOW UP  No future appointments.  No follow-up provider specified.    MEDICATIONS ON DISCHARGE     Medication List      START taking these medications      Instructions   glucose 4 g chewable tablet   Chew 4 Tablets as needed for Low Blood Sugar (If FSBG is less than or equal to 70 mg/dL and patient able to eat or drink).  Dose: 16 g     insulin regular 100 Unit/mL Soln  Commonly known as: HumuLIN R   Inject 1-6 Units under the skin 4 Times a Day,Before Meals and at Bedtime.  Dose: 1-6 Units     * lidocaine 5 % Ptch  Commonly known as: LIDODERM   Place 1 Patch on the skin every 24 hours.  Dose: 1 Patch     * lidocaine 5 % Ptch  Start taking on: December 1, 2021  Commonly known as: LIDODERM   Place 1 Patch on the skin every day.  Dose: 1 Patch     magnesium oxide 400 MG Tabs tablet  Commonly known as: MAG-OX   Take 1 Tablet by mouth 2 times a day.  Dose: 400 mg     metaxalone 800 MG Tabs  Commonly known as: Skelaxin   Take 1 Tablet by mouth 3 times a day.  Dose: 800 mg     phosphorus 250 MG tablet  Commonly known as: K-Phos-Neutral   Take 1 Tablet by mouth every 6 hours.  Dose: 1 Tablet     polyethylene glycol/lytes 17 g Pack  Commonly known as: MIRALAX   Take 1 Packet by mouth 2 times a day.  Dose: 17 g         * This list has 2 medication(s) that are the same as other medications prescribed for you. Read the directions carefully, and ask your doctor  or other care provider to review them with you.            CONTINUE taking these medications      Instructions   amLODIPine 5 MG Tabs  Commonly known as: NORVASC   Take 5 mg by mouth every bedtime.  Dose: 5 mg     donepezil 5 MG Tabs  Commonly known as: ARICEPT   Take 5 mg by mouth every evening.  Dose: 5 mg     furosemide 20 MG Tabs  Commonly known as: LASIX   Take 20 mg by mouth as needed. Indications: Edema  Dose: 20 mg     glipiZIDE 5 MG Tabs  Commonly known as: GLUCOTROL   Take 5 mg by mouth 2 times a day.  Dose: 5 mg     meclizine 25 MG Tabs  Commonly known as: ANTIVERT   Take 25 mg by mouth 3 times a day as needed for Nausea/Vomiting.  Dose: 25 mg     metFORMIN  MG Tb24  Commonly known as: GLUCOPHAGE XR   Take 500-1,500 mg by mouth 2 times a day. Pt takes 500MG in AM and 1500MG every evening  Dose: 500-1,500 mg     metoprolol SR 25 MG Tb24  Commonly known as: TOPROL XL   Take 25 mg by mouth every day.  Dose: 25 mg     potassium chloride ER 10 MEQ tablet  Commonly known as: KLOR-CON         STOP taking these medications    B COMPLEX 1 PO     HYDROcodone-acetaminophen 5-325 MG Tabs per tablet  Commonly known as: NORCO     HYDROcodone-acetaminophen 7.5-325 MG per tablet  Commonly known as: Norco     ibuprofen 200 MG Tabs  Commonly known as: MOTRIN     PREVAGEN PO            Allergies  No Known Allergies    DIET  Orders Placed This Encounter   Procedures   • Diet Order Diet: Low Fiber(GI Soft); Second Modifier: (optional): Consistent CHO (Diabetic)     Standing Status:   Standing     Number of Occurrences:   1     Order Specific Question:   Diet:     Answer:   Low Fiber(GI Soft) [2]     Order Specific Question:   Second Modifier: (optional)     Answer:   Consistent CHO (Diabetic) [4]       ACTIVITY  As tolerated.  Weight bearing as tolerated    CONSULTATIONS  Gastroenterology  Orthopedics    PROCEDURES  None    LABORATORY  Lab Results   Component Value Date    SODIUM 134 (L) 11/30/2021    POTASSIUM 3.3 (L)  11/30/2021    CHLORIDE 99 11/30/2021    CO2 25 11/30/2021    GLUCOSE 158 (H) 11/30/2021    BUN 7 (L) 11/30/2021    CREATININE 0.41 (L) 11/30/2021        Lab Results   Component Value Date    WBC 9.9 11/30/2021    HEMOGLOBIN 11.2 (L) 11/30/2021    HEMATOCRIT 33.3 (L) 11/30/2021    PLATELETCT 486 (H) 11/30/2021        Total time of the discharge process exceeds 37 minutes.

## 2021-11-30 NOTE — PROGRESS NOTES
Pt discharged to [facility]. Discharge instructions provided to pt. Pt verbalizes understanding. Pt states all questions have been answered. Signed copy in chart. Prescriptions sent to n/a. Pt states that all personal belongings are in possession. Pt off unit via wheelchair, escorted by [facility] transport.

## 2021-11-30 NOTE — CARE PLAN
The patient is Stable - Low risk of patient condition declining or worsening    Shift Goals  Clinical Goals: Pain management  Patient Goals: Discharge  Family Goals: Up for meals    Progress made toward(s) clinical / shift goals:  Patient denies any pain, will continue to monitor for pain and medicate per MAR.    Patient is not progressing towards the following goals:    Problem: Knowledge Deficit - Standard  Goal: Patient and family/care givers will demonstrate understanding of plan of care, disease process/condition, diagnostic tests and medications  Outcome: Progressing  Discussed plan of care with patient including mobility, safety with ambulation, medications ordered like metformin and lasix. Allowed time for questions, patient agreed and verbalized understanding.     Problem: Mobility  Goal: Patient's capacity to carry out activities will improve  Outcome: Progressing  Patient ambulated to commode and up to the chair for meals. Patient to discharge at 1500 to [facility] to continue therapy.

## 2021-11-30 NOTE — DISCHARGE PLANNING
Agency/Facility Name: [facility]  Spoke To: Cynthia  Outcome: Pt accepted. Bed available 11/30. We need to set up transport for 1200 or after.      [facility] informed

## 2021-11-30 NOTE — PROGRESS NOTES
Telemetry Shift Summary     Rhythm: SR/ST with BBB  HR:   Ectopy: r PAC    Measurements: 0.18/0.14/0.32    Normal Values  Rhythm: SR  HR:   Measurements: 0.12-0.20/0.08-0.10/0.30-0.52

## 2021-11-30 NOTE — DISCHARGE INSTRUCTIONS
Discharge Instructions    Discharged to other by medical transportation with escort. Discharged via wheelchair, hospital escort: Yes.  Special equipment needed: Not Applicable    Be sure to schedule a follow-up appointment with your primary care doctor or any specialists as instructed.     Discharge Plan:   Diet Plan: (P) Discussed  Activity Level: (P) Discussed  Confirmed Follow up Appointment: (P) Patient to Call and Schedule Appointment  Confirmed Symptoms Management: (P) Discussed  Medication Reconciliation Updated: (P) Yes    I understand that a diet low in cholesterol, fat, and sodium is recommended for good health. Unless I have been given specific instructions below for another diet, I accept this instruction as my diet prescription.   Other diet: Diabetic    Special Instructions: None    · Is patient discharged on Warfarin / Coumadin?   No       Fall Prevention in the Home, Adult  Falls can cause injuries. They can happen to people of all ages. There are many things you can do to make your home safe and to help prevent falls. Ask for help when making these changes, if needed.  What actions can I take to prevent falls?  General Instructions  · Use good lighting in all rooms. Replace any light bulbs that burn out.  · Turn on the lights when you go into a dark area. Use night-lights.  · Keep items that you use often in easy-to-reach places. Lower the shelves around your home if necessary.  · Set up your furniture so you have a clear path. Avoid moving your furniture around.  · Do not have throw rugs and other things on the floor that can make you trip.  · Avoid walking on wet floors.  · If any of your floors are uneven, fix them.  · Add color or contrast paint or tape to clearly kathy and help you see:  ? Any grab bars or handrails.  ? First and last steps of stairways.  ? Where the edge of each step is.  · If you use a stepladder:  ? Make sure that it is fully opened. Do not climb a closed stepladder.  ? Make  sure that both sides of the stepladder are locked into place.  ? Ask someone to hold the stepladder for you while you use it.  · If there are any pets around you, be aware of where they are.  What can I do in the bathroom?         · Keep the floor dry. Clean up any water that spills onto the floor as soon as it happens.  · Remove soap buildup in the tub or shower regularly.  · Use non-skid mats or decals on the floor of the tub or shower.  · Attach bath mats securely with double-sided, non-slip rug tape.  · If you need to sit down in the shower, use a plastic, non-slip stool.  · Install grab bars by the toilet and in the tub and shower. Do not use towel bars as grab bars.  What can I do in the bedroom?  · Make sure that you have a light by your bed that is easy to reach.  · Do not use any sheets or blankets that are too big for your bed. They should not hang down onto the floor.  · Have a firm chair that has side arms. You can use this for support while you get dressed.  What can I do in the kitchen?  · Clean up any spills right away.  · If you need to reach something above you, use a strong step stool that has a grab bar.  · Keep electrical cords out of the way.  · Do not use floor polish or wax that makes floors slippery. If you must use wax, use non-skid floor wax.  What can I do with my stairs?  · Do not leave any items on the stairs.  · Make sure that you have a light switch at the top of the stairs and the bottom of the stairs. If you do not have them, ask someone to add them for you.  · Make sure that there are handrails on both sides of the stairs, and use them. Fix handrails that are broken or loose. Make sure that handrails are as long as the stairways.  · Install non-slip stair treads on all stairs in your home.  · Avoid having throw rugs at the top or bottom of the stairs. If you do have throw rugs, attach them to the floor with carpet tape.  · Choose a carpet that does not hide the edge of the steps on  the stairway.  · Check any carpeting to make sure that it is firmly attached to the stairs. Fix any carpet that is loose or worn.  What can I do on the outside of my home?  · Use bright outdoor lighting.  · Regularly fix the edges of walkways and driveways and fix any cracks.  · Remove anything that might make you trip as you walk through a door, such as a raised step or threshold.  · Trim any bushes or trees on the path to your home.  · Regularly check to see if handrails are loose or broken. Make sure that both sides of any steps have handrails.  · Install guardrails along the edges of any raised decks and porches.  · Clear walking paths of anything that might make someone trip, such as tools or rocks.  · Have any leaves, snow, or ice cleared regularly.  · Use sand or salt on walking paths during winter.  · Clean up any spills in your garage right away. This includes grease or oil spills.  What other actions can I take?  · Wear shoes that:  ? Have a low heel. Do not wear high heels.  ? Have rubber bottoms.  ? Are comfortable and fit you well.  ? Are closed at the toe. Do not wear open-toe sandals.  · Use tools that help you move around (mobility aids) if they are needed. These include:  ? Canes.  ? Walkers.  ? Scooters.  ? Crutches.  · Review your medicines with your doctor. Some medicines can make you feel dizzy. This can increase your chance of falling.  Ask your doctor what other things you can do to help prevent falls.  Where to find more information  · Centers for Disease Control and Prevention, STEADI: https://cdc.gov  · National Couch on Aging: https://cz7widm.donavan.nih.gov  Contact a doctor if:  · You are afraid of falling at home.  · You feel weak, drowsy, or dizzy at home.  · You fall at home.  Summary  · There are many simple things that you can do to make your home safe and to help prevent falls.  · Ways to make your home safe include removing tripping hazards and installing grab bars in the  bathroom.  · Ask for help when making these changes in your home.  This information is not intended to replace advice given to you by your health care provider. Make sure you discuss any questions you have with your health care provider.  Document Released: 10/14/2010 Document Revised: 04/09/2020 Document Reviewed: 08/02/2018  Elsevier Patient Education © 2020 Elsevier Inc.      Depression / Suicide Risk    As you are discharged from this [facility] facility, it is important to learn how to keep safe from harming yourself.    Recognize the warning signs:  · Abrupt changes in personality, positive or negative- including increase in energy   · Giving away possessions  · Change in eating patterns- significant weight changes-  positive or negative  · Change in sleeping patterns- unable to sleep or sleeping all the time   · Unwillingness or inability to communicate  · Depression  · Unusual sadness, discouragement and loneliness  · Talk of wanting to die  · Neglect of personal appearance   · Rebelliousness- reckless behavior  · Withdrawal from people/activities they love  · Confusion- inability to concentrate     If you or a loved one observes any of these behaviors or has concerns about self-harm, here's what you can do:  · Talk about it- your feelings and reasons for harming yourself  · Remove any means that you might use to hurt yourself (examples: pills, rope, extension cords, firearm)  · Get professional help from the community (Mental Health, Substance Abuse, psychological counseling)  · Do not be alone:Call your Safe Contact- someone whom you trust who will be there for you.  · Call your local CRISIS HOTLINE 872-2426 or 395-298-7941  · Call your local Children's Mobile Crisis Response Team [place] (484) 491-9932 or www.[employer]Â®  · Call the toll free National Suicide Prevention Hotlines   · National Suicide Prevention Lifeline 047-233-SBTO (8578)  · National Hope Line Network 800-SUICIDE  (245-5857)

## 2021-11-30 NOTE — DISCHARGE PLANNING
Anticipated Discharge Disposition: [facility]     Action: LSW informed by Jose Antonio with [facility] that pt's transport via [facility] confirmed for 1500 today.     LSW provided update to Dr. Armstrong and Laura JEAN to notify [facility].     Barriers to Discharge: None     Plan: LSW to complete transfer packet and COBRA     Addendum 1246  LSW called pt's daughter, Jennifer- [phone] and provided update for pt's transfer today. Jennifer plans to be onsite prior to pt transferring to SNF.     LSW to meet with pt to sign COBRA. LSW to place transfer packet in pt's chart.

## 2021-11-30 NOTE — DISCHARGE PLANNING
DC Transport Scheduled    Received request at: 1158     Transport Company Scheduled:  [facility]    Scheduled Date: 11/30/21  Scheduled Time: 1500    Destination: [facility]  [address]    Notified care team of scheduled transport via Vo[employer]te.     If there are any changes needed to the DC transportation scheduled, please contact [facility] at ext. [phone] between the hours of 4037-0294 Mon-Fri. If outside those hours, contact the ED Case Manager at ext. [phone].

## 2021-11-30 NOTE — DISCHARGE PLANNING
Agency/Facility Name: [facility]  Spoke To: Cynthia  Outcome: Advised of [facility] transport time 11/30 @ 1530      CM informed

## 2021-11-30 NOTE — DISCHARGE PLANNING
Anticipated Discharge Disposition: SNF with wound care and ABX      Action: CATRACHITO RN met with pt at bedside. Discussed choice for SNF. Pt stating previously at [facility] [place] and would like to go back. Also sent choice for [facility]. Choice form faxed to [facility].     Barriers to Discharge: Medical clearance and SNF acceptance.      Plan: Case management to follow up with medical clearance during IDT rounds.

## 2021-11-30 NOTE — PROGRESS NOTES
Telemetry Shift Summary    Rhythm SR-ST with BBB  HR Range   Ectopy rare PACs  Measurements .20/.12/.36    Dr. Armstrong notified of patient's T wave inverted.     Normal Values  Rhythm SR  HR Range    Measurements 0.12-0.20 / 0.06-0.10  / 0.30-0.52

## 2021-11-30 NOTE — DISCHARGE PLANNING
Agency/Facility Name: [facility]  Spoke To: Cynthia  Outcome: Will call back after morning meeting      CM informed

## 2021-12-01 ENCOUNTER — TELEPHONE (OUTPATIENT)
Dept: SCHEDULING | Facility: IMAGING CENTER | Age: 76
End: 2021-12-01

## 2022-01-07 ENCOUNTER — OFFICE VISIT (OUTPATIENT)
Dept: MEDICAL GROUP | Facility: PHYSICIAN GROUP | Age: 77
End: 2022-01-07
Payer: MEDICARE

## 2022-01-07 ENCOUNTER — HOSPITAL ENCOUNTER (OUTPATIENT)
Dept: LAB | Facility: MEDICAL CENTER | Age: 77
End: 2022-01-07
Attending: INTERNAL MEDICINE
Payer: MEDICARE

## 2022-01-07 VITALS
TEMPERATURE: 97.7 F | SYSTOLIC BLOOD PRESSURE: 130 MMHG | WEIGHT: 181.2 LBS | HEIGHT: 62 IN | DIASTOLIC BLOOD PRESSURE: 78 MMHG | HEART RATE: 106 BPM | BODY MASS INDEX: 33.34 KG/M2 | OXYGEN SATURATION: 94 % | RESPIRATION RATE: 18 BRPM

## 2022-01-07 DIAGNOSIS — S32.010A CLOSED COMPRESSION FRACTURE OF BODY OF L1 VERTEBRA (HCC): ICD-10-CM

## 2022-01-07 DIAGNOSIS — R00.0 TACHYCARDIA: ICD-10-CM

## 2022-01-07 DIAGNOSIS — I10 ESSENTIAL HYPERTENSION: ICD-10-CM

## 2022-01-07 DIAGNOSIS — F32.A ANXIETY AND DEPRESSION: ICD-10-CM

## 2022-01-07 DIAGNOSIS — Z13.0 SCREENING FOR DEFICIENCY ANEMIA: ICD-10-CM

## 2022-01-07 DIAGNOSIS — F41.9 ANXIETY AND DEPRESSION: ICD-10-CM

## 2022-01-07 DIAGNOSIS — Z85.3 HISTORY OF BREAST CANCER: ICD-10-CM

## 2022-01-07 DIAGNOSIS — Z13.29 THYROID DISORDER SCREEN: ICD-10-CM

## 2022-01-07 DIAGNOSIS — E11.9 TYPE 2 DIABETES MELLITUS WITHOUT COMPLICATION, WITHOUT LONG-TERM CURRENT USE OF INSULIN (HCC): ICD-10-CM

## 2022-01-07 DIAGNOSIS — Z13.6 SCREENING FOR CARDIOVASCULAR CONDITION: ICD-10-CM

## 2022-01-07 PROBLEM — R29.6 REPEATED FALLS: Status: ACTIVE | Noted: 2021-11-30

## 2022-01-07 PROBLEM — F03.90 UNSPECIFIED DEMENTIA WITHOUT BEHAVIORAL DISTURBANCE: Status: ACTIVE | Noted: 2021-11-30

## 2022-01-07 PROBLEM — E87.6 HYPOKALEMIA: Status: RESOLVED | Noted: 2021-11-23 | Resolved: 2022-01-07

## 2022-01-07 PROBLEM — E83.39 HYPOPHOSPHATEMIA: Status: RESOLVED | Noted: 2021-11-23 | Resolved: 2022-01-07

## 2022-01-07 PROBLEM — C50.911 MALIGNANT NEOPLASM OF UNSPECIFIED SITE OF RIGHT FEMALE BREAST (HCC): Status: ACTIVE | Noted: 2021-11-30

## 2022-01-07 PROBLEM — M62.81 MUSCLE WEAKNESS (GENERALIZED): Status: ACTIVE | Noted: 2021-12-01

## 2022-01-07 PROBLEM — E87.1 HYPONATREMIA: Status: RESOLVED | Noted: 2021-11-21 | Resolved: 2022-01-07

## 2022-01-07 PROBLEM — E83.42 HYPOMAGNESEMIA: Status: RESOLVED | Noted: 2021-11-23 | Resolved: 2022-01-07

## 2022-01-07 LAB
ALBUMIN SERPL BCP-MCNC: 4.3 G/DL (ref 3.2–4.9)
ALBUMIN/GLOB SERPL: 1.7 G/DL
ALP SERPL-CCNC: 87 U/L (ref 30–99)
ALT SERPL-CCNC: 31 U/L (ref 2–50)
ANION GAP SERPL CALC-SCNC: 15 MMOL/L (ref 7–16)
AST SERPL-CCNC: 17 U/L (ref 12–45)
BASOPHILS # BLD AUTO: 0.8 % (ref 0–1.8)
BASOPHILS # BLD: 0.09 K/UL (ref 0–0.12)
BILIRUB SERPL-MCNC: 0.2 MG/DL (ref 0.1–1.5)
BUN SERPL-MCNC: 10 MG/DL (ref 8–22)
CALCIUM SERPL-MCNC: 9.8 MG/DL (ref 8.5–10.5)
CHLORIDE SERPL-SCNC: 102 MMOL/L (ref 96–112)
CHOLEST SERPL-MCNC: 298 MG/DL (ref 100–199)
CO2 SERPL-SCNC: 19 MMOL/L (ref 20–33)
CREAT SERPL-MCNC: 0.57 MG/DL (ref 0.5–1.4)
EOSINOPHIL # BLD AUTO: 0.4 K/UL (ref 0–0.51)
EOSINOPHIL NFR BLD: 3.7 % (ref 0–6.9)
ERYTHROCYTE [DISTWIDTH] IN BLOOD BY AUTOMATED COUNT: 46.5 FL (ref 35.9–50)
EST. AVERAGE GLUCOSE BLD GHB EST-MCNC: 128 MG/DL
FASTING STATUS PATIENT QL REPORTED: NORMAL
GLOBULIN SER CALC-MCNC: 2.6 G/DL (ref 1.9–3.5)
GLUCOSE SERPL-MCNC: 185 MG/DL (ref 65–99)
HBA1C MFR BLD: 6.1 % (ref 4–5.6)
HCT VFR BLD AUTO: 44.7 % (ref 37–47)
HDLC SERPL-MCNC: 48 MG/DL
HGB BLD-MCNC: 14.2 G/DL (ref 12–16)
IMM GRANULOCYTES # BLD AUTO: 0.07 K/UL (ref 0–0.11)
IMM GRANULOCYTES NFR BLD AUTO: 0.6 % (ref 0–0.9)
LDLC SERPL CALC-MCNC: 190 MG/DL
LYMPHOCYTES # BLD AUTO: 2.7 K/UL (ref 1–4.8)
LYMPHOCYTES NFR BLD: 24.9 % (ref 22–41)
MAGNESIUM SERPL-MCNC: 1.4 MG/DL (ref 1.5–2.5)
MCH RBC QN AUTO: 30.5 PG (ref 27–33)
MCHC RBC AUTO-ENTMCNC: 31.8 G/DL (ref 33.6–35)
MCV RBC AUTO: 96.1 FL (ref 81.4–97.8)
MONOCYTES # BLD AUTO: 0.54 K/UL (ref 0–0.85)
MONOCYTES NFR BLD AUTO: 5 % (ref 0–13.4)
NEUTROPHILS # BLD AUTO: 7.06 K/UL (ref 2–7.15)
NEUTROPHILS NFR BLD: 65 % (ref 44–72)
NRBC # BLD AUTO: 0 K/UL
NRBC BLD-RTO: 0 /100 WBC
PHOSPHATE SERPL-MCNC: 3.1 MG/DL (ref 2.5–4.5)
PLATELET # BLD AUTO: 598 K/UL (ref 164–446)
PMV BLD AUTO: 9.5 FL (ref 9–12.9)
POTASSIUM SERPL-SCNC: 4.1 MMOL/L (ref 3.6–5.5)
PROT SERPL-MCNC: 6.9 G/DL (ref 6–8.2)
RBC # BLD AUTO: 4.65 M/UL (ref 4.2–5.4)
SODIUM SERPL-SCNC: 136 MMOL/L (ref 135–145)
TRIGL SERPL-MCNC: 299 MG/DL (ref 0–149)
TSH SERPL DL<=0.005 MIU/L-ACNC: 1.58 UIU/ML (ref 0.38–5.33)
WBC # BLD AUTO: 10.9 K/UL (ref 4.8–10.8)

## 2022-01-07 PROCEDURE — 83036 HEMOGLOBIN GLYCOSYLATED A1C: CPT | Mod: GA

## 2022-01-07 PROCEDURE — 84443 ASSAY THYROID STIM HORMONE: CPT

## 2022-01-07 PROCEDURE — 85025 COMPLETE CBC W/AUTO DIFF WBC: CPT

## 2022-01-07 PROCEDURE — 80061 LIPID PANEL: CPT

## 2022-01-07 PROCEDURE — 83735 ASSAY OF MAGNESIUM: CPT

## 2022-01-07 PROCEDURE — 80053 COMPREHEN METABOLIC PANEL: CPT

## 2022-01-07 PROCEDURE — 99204 OFFICE O/P NEW MOD 45 MIN: CPT | Performed by: INTERNAL MEDICINE

## 2022-01-07 PROCEDURE — 36415 COLL VENOUS BLD VENIPUNCTURE: CPT

## 2022-01-07 PROCEDURE — 84100 ASSAY OF PHOSPHORUS: CPT

## 2022-01-07 RX ORDER — HYDROXYZINE HYDROCHLORIDE 25 MG/1
TABLET, FILM COATED ORAL
COMMUNITY
Start: 2021-12-29 | End: 2022-01-13 | Stop reason: SDUPTHER

## 2022-01-07 RX ORDER — AMLODIPINE BESYLATE 10 MG/1
TABLET ORAL
COMMUNITY
Start: 2021-12-31 | End: 2022-01-24 | Stop reason: SDUPTHER

## 2022-01-07 RX ORDER — CITALOPRAM HYDROBROMIDE 10 MG/1
10 TABLET ORAL DAILY
Qty: 30 TABLET | Refills: 2 | Status: SHIPPED | OUTPATIENT
Start: 2022-01-07 | End: 2022-04-07 | Stop reason: SDUPTHER

## 2022-01-07 ASSESSMENT — PATIENT HEALTH QUESTIONNAIRE - PHQ9
5. POOR APPETITE OR OVEREATING: 0 - NOT AT ALL
SUM OF ALL RESPONSES TO PHQ QUESTIONS 1-9: 18
CLINICAL INTERPRETATION OF PHQ2 SCORE: 5

## 2022-01-07 ASSESSMENT — FIBROSIS 4 INDEX: FIB4 SCORE: 0.81

## 2022-01-07 NOTE — PROGRESS NOTES
Subjective:     CC: Establish care    HISTORY OF THE PRESENT ILLNESS: Patient is a 76 y.o. female. This pleasant patient is here today to establish care and discuss the following issues:    This is a patient with type 2 diabetes and essential hypertension who presents to establish care.  She is from [facility] and has not received regular medical care.  She was admitted from 11/21/2021 to 11/30/2021 status post fall with L1 closed compression fracture.  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  Prior to her admission she had been having significant weakness with multiple falls.  She became further debilitated during her admission and was discharged to a skilled facility for PT/OT.  The patient has follow-up with orthopedics and will need kyphoplasty.    Since her recent hospital discharge, the patient has been noted to have a pulse in the 100s to 120s.  She denies chest pain or shortness of breath.  She has been hydrating adequately with Gatorade.  She is also on metoprolol 25 mg daily.    The patient has recently been struggling with some anxiety and depression for which she is taking hydroxyzine.  The family reports that since her recent admission, she has been struggling with motivation to return to her previous activity levels and will also have outbursts of anger.    Allergies: Patient has no known allergies.    Current Outpatient Medications Ordered in Epic   Medication Sig Dispense Refill   • citalopram (CELEXA) 10 MG tablet Take 1 Tablet by mouth every day. 30 Tablet 2   • metaxalone (SKELAXIN) 800 MG Tab Take 1 Tablet by mouth 3 times a day. 90 Tablet    • phosphorus (K-PHOS-NEUTRAL) 250 MG tablet Take 1 Tablet by mouth every 6 hours. 60 Tablet    • glipiZIDE (GLUCOTROL) 5 MG Tab Take 5 mg by mouth 2 times a day.     • donepezil (ARICEPT) 5 MG Tab Take 5 mg by mouth every evening.     • metFORMIN ER (GLUCOPHAGE XR) 500 MG TABLET SR 24 HR Take 500-1,500 mg by mouth 2 times a  day. Pt takes 500MG in AM and 1500MG every evening     • furosemide (LASIX) 20 MG Tab Take 20 mg by mouth as needed. Indications: Edema     • metoprolol SR (TOPROL XL) 25 MG TABLET SR 24 HR Take 25 mg by mouth every day.     • potassium chloride ER (KLOR-CON) 10 MEQ tablet Take 2 Tablets by mouth every day. 180 Tablet 3   • meclizine (ANTIVERT) 25 MG Tab Take 1 Tablet by mouth 3 times a day as needed for Nausea/Vomiting. 60 Tablet 3   • hydrOXYzine HCl (ATARAX) 25 MG Tab TAKE 1 TABLET BY MOUTH TWICE DAILY FOR ANXIETY 180 Tablet 3   • amLODIPine (NORVASC) 10 MG Tab TAKE 1 TABLET BY MOUTH AT BEDTIME FOR HYPERTENSION. HOLD FOR SYSTOLIC BLOOD PRESSURE LESS THAN 110     • magnesium oxide (MAG-OX) 400 MG Tab tablet Take 1 Tablet by mouth 2 times a day. (Patient not taking: Reported on 1/7/2022) 30 Tablet    • polyethylene glycol/lytes (MIRALAX) 17 g Pack Take 1 Packet by mouth 2 times a day. (Patient not taking: Reported on 1/7/2022)  3   • Dextrose, Diabetic Use, (GLUCOSE) 4 g chewable tablet Chew 4 Tablets as needed for Low Blood Sugar (If FSBG is less than or equal to 70 mg/dL and patient able to eat or drink). (Patient not taking: Reported on 1/7/2022) 30 Tablet      No current Epic-ordered facility-administered medications on file.       Past Medical History:   Diagnosis Date   • Anesthesia     PONV; anxious   • Arthritis    • Cancer (HCC) 3/2012    right breast cancer X 2   (1st time Dx 18 yrs ago , had radation and lumpectomy)   • Heart burn    • Hepatitis as child   • Hypertension     on metoprolol and amlodipine   • Hypokalemia 11/23/2021   • Hypomagnesemia 11/23/2021   • Hyponatremia 11/21/2021   • Hypophosphatemia 11/23/2021   • Indigestion     on Nexium prn   • Leukocytosis 3/2012   • Unspecified disorder of thyroid     lobectomy, benign nodule   • Unspecified urinary incontinence        Past Surgical History:   Procedure Laterality Date   • BUNIONECTOMY Right 9/14/2015    Procedure: BUNIONECTOMY;  Surgeon:  "Brown Hammonds M.D.;  Location: SURGERY [facility];  Service:    • MASTECTOMY Bilateral    • LUMPECTOMY      right breast; excision lymph nodes   • THYROID LOBECTOMY     • ABDOMINAL HYSTERECTOMY TOTAL      Hysterectomy, Total Abdominal   • TUBAL LIGATION      oophorectomy   • BREAST RECONSTRUCTION Bilateral     after mastectomy  by Dr Cheema       Social History     Tobacco Use   • Smoking status: Former Smoker     Packs/day: 1.00     Years: 20.00     Pack years: 20.00     Quit date: 1997     Years since quittin.0   • Smokeless tobacco: Never Used   Vaping Use   • Vaping Use: Never used   Substance Use Topics   • Alcohol use: Yes     Comment: 2 per week   • Drug use: No       Social History     Social History Narrative   • Not on file       Family History   Problem Relation Age of Onset   • Heart Disease Other    • Hypertension Mother    • Stroke Mother    • Diabetes Father    • Diabetes Brother        Health Maintenance: Completed    ROS:   Gen: no fevers/chills  Pulm: no sob, no cough  CV: no chest pain  GI: no nausea/vomiting, no diarrhea  Neuro: no headaches, no numbness/tingling      Objective:     Exam: /78 (BP Location: Right arm, Patient Position: Sitting, BP Cuff Size: Adult)   Pulse (!) 106   Temp 36.5 °C (97.7 °F) (Temporal)   Resp 18   Ht 1.575 m (5' 2\")   Wt 82.2 kg (181 lb 3.2 oz)   SpO2 94%  Body mass index is 33.14 kg/m².    General: Normal appearing. No distress.  HEENT: Normocephalic. Eyes conjunctiva clear lids without ptosis, pupils equal and reactive to light accommodation, oropharynx is without erythema, edema or exudates.   Pulmonary: Clear to ausculation.  Normal effort. No rales, ronchi, or wheezing.  Cardiovascular: Regular rate and rhythm without murmur.   Abdomen: Soft, nontender, nondistended.   Musculoskeletal: No extremity cyanosis, clubbing, or edema.  Psych: Normal mood and affect. Alert and oriented x3. Judgment and insight is " normal.    Assessment & Plan:   76 y.o. female with the following -    Type 2 diabetes mellitus without complication, without long-term current use of insulin (HCC)  This is a chronic medical condition.  Ongoing.  The patient takes metformin 500 mg ER in the mornings and 1500 mg ER in the evenings, and glipizide 5 mg twice daily.  No hemoglobin A1c available for review.  -Continue metformin 500 mg ER in the mornings and 1500 mg ER in the evenings  -Continue glipizide 5 mg twice daily  - HEMOGLOBIN A1C; Future    Essential hypertension  This is a chronic medical condition.  Ongoing.  The patient is on metoprolol 25 mg SR daily, amlodipine 10 mg daily, furosemide 20 mg as needed with KCl 20 mEq daily.  The patient denies new headache, chest pain, shortness of breath, lower extremity edema.  Blood pressure is normal today's visit.  -Continue metoprolol 25 mg SR daily  -Continue amlodipine 10 mg daily  -Continue furosemide 20 mg as needed and KCl 20 mEq daily  - Comp Metabolic Panel; Future  - MAGNESIUM; Future  - PHOSPHORUS; Future    Tachycardia  This is an acute condition.  The patient has recently been noted to have a pulse in the 100s to 120s.  She is maintaining adequate hydration.  We will obtain an EKG at the patient's follow-up visit.  - Cardiac Event Monitor; Future    Anxiety and depression  This is a chronic condition.  Progressive.  The patient is currently taking hydroxyzine 25 mg twice daily.  The patient's family reports that the patient has been struggling with some anxiety as well as lack of motivation.  She also will seem depressed and have outbursts of anger.  We discussed that hydroxyzine can be sedating and could be contributing to the patient's lack of motivation.  -Start trial of citalopram 10 mg daily with plan to hopefully taper off of hydroxyzine  -Continue hydroxyzine 25 mg as needed  - citalopram (CELEXA) 10 MG tablet; Take 1 Tablet by mouth every day.  Dispense: 30 Tablet; Refill:  2    Closed compression fracture of body of L1 vertebra (HCC)  This is a chronic condition.  Improving.  The patient was admitted from 11/21/2021 to 11/30/2021 status post fall with L1 closed compression fracture.  She was discharged to a skilled facility for PT/OT for debilitating weakness.  She will need kyphoplasty and is to follow-up with orthopedics.  She is on metaxalone 800 mg 3 times daily.    History of breast cancer  This is a chronic condition.  The patient was initially diagnosed with right-sided breast cancer and underwent partial mastectomy and radiation treatment in 1997.  She then had a recurrence in 2012 and underwent bilateral mastectomy with reconstruction.  Will obtain more details at the patient's follow-up visit.    Screening for deficiency anemia  - CBC WITH DIFFERENTIAL; Future    Thyroid disorder screen  - TSH WITH REFLEX TO FT4; Future    Screening for cardiovascular condition  - Lipid Profile; Future    Return in about 2 weeks (around 1/21/2022) for f/u labs.    Please note that this dictation was created using voice recognition software. I have made every reasonable attempt to correct obvious errors, but I expect that there are errors of grammar and possibly content that I did not discover before finalizing the note.

## 2022-01-13 RX ORDER — MECLIZINE HYDROCHLORIDE 25 MG/1
25 TABLET ORAL 3 TIMES DAILY PRN
Qty: 60 TABLET | Refills: 3 | Status: SHIPPED | OUTPATIENT
Start: 2022-01-13 | End: 2023-10-31

## 2022-01-13 RX ORDER — HYDROXYZINE HYDROCHLORIDE 25 MG/1
TABLET, FILM COATED ORAL
Qty: 180 TABLET | Refills: 3 | Status: SHIPPED | OUTPATIENT
Start: 2022-01-13 | End: 2022-01-13 | Stop reason: SDUPTHER

## 2022-01-13 RX ORDER — HYDROXYZINE HYDROCHLORIDE 25 MG/1
TABLET, FILM COATED ORAL
Qty: 180 TABLET | Refills: 3 | Status: SHIPPED | OUTPATIENT
Start: 2022-01-13 | End: 2023-04-01

## 2022-01-13 RX ORDER — POTASSIUM CHLORIDE 750 MG/1
20 TABLET, FILM COATED, EXTENDED RELEASE ORAL DAILY
Qty: 180 TABLET | Refills: 3 | Status: SHIPPED | OUTPATIENT
Start: 2022-01-13 | End: 2022-01-13 | Stop reason: SDUPTHER

## 2022-01-13 RX ORDER — POTASSIUM CHLORIDE 750 MG/1
20 TABLET, FILM COATED, EXTENDED RELEASE ORAL DAILY
Qty: 180 TABLET | Refills: 3 | Status: SHIPPED | OUTPATIENT
Start: 2022-01-13

## 2022-01-13 RX ORDER — MECLIZINE HYDROCHLORIDE 25 MG/1
25 TABLET ORAL 3 TIMES DAILY PRN
Qty: 60 TABLET | Refills: 3 | Status: SHIPPED | OUTPATIENT
Start: 2022-01-13 | End: 2022-01-13 | Stop reason: SDUPTHER

## 2022-01-18 PROBLEM — Z85.3 HISTORY OF BREAST CANCER: Status: ACTIVE | Noted: 2022-01-18

## 2022-01-18 PROBLEM — F41.9 ANXIETY AND DEPRESSION: Status: ACTIVE | Noted: 2022-01-18

## 2022-01-18 PROBLEM — R00.0 TACHYCARDIA: Status: ACTIVE | Noted: 2022-01-18

## 2022-01-18 PROBLEM — F32.A ANXIETY AND DEPRESSION: Status: ACTIVE | Noted: 2022-01-18

## 2022-01-18 PROBLEM — C50.911 MALIGNANT NEOPLASM OF UNSPECIFIED SITE OF RIGHT FEMALE BREAST (HCC): Status: RESOLVED | Noted: 2021-11-30 | Resolved: 2022-01-18

## 2022-01-19 ENCOUNTER — OFFICE VISIT (OUTPATIENT)
Dept: MEDICAL GROUP | Facility: PHYSICIAN GROUP | Age: 77
End: 2022-01-19
Payer: MEDICARE

## 2022-01-19 VITALS
TEMPERATURE: 97.3 F | OXYGEN SATURATION: 96 % | BODY MASS INDEX: 33.68 KG/M2 | HEART RATE: 89 BPM | SYSTOLIC BLOOD PRESSURE: 126 MMHG | RESPIRATION RATE: 16 BRPM | WEIGHT: 183 LBS | HEIGHT: 62 IN | DIASTOLIC BLOOD PRESSURE: 74 MMHG

## 2022-01-19 DIAGNOSIS — R79.0 LOW SERUM PHOSPHORUS FOR AGE: ICD-10-CM

## 2022-01-19 DIAGNOSIS — D75.839 THROMBOCYTOSIS: ICD-10-CM

## 2022-01-19 DIAGNOSIS — E11.69 HYPERLIPIDEMIA ASSOCIATED WITH TYPE 2 DIABETES MELLITUS (HCC): ICD-10-CM

## 2022-01-19 DIAGNOSIS — F41.9 ANXIETY AND DEPRESSION: ICD-10-CM

## 2022-01-19 DIAGNOSIS — E87.6 LOW SERUM POTASSIUM LEVEL: ICD-10-CM

## 2022-01-19 DIAGNOSIS — M80.00XS AGE-RELATED OSTEOPOROSIS WITH CURRENT PATHOLOGICAL FRACTURE, SEQUELA: ICD-10-CM

## 2022-01-19 DIAGNOSIS — F32.A ANXIETY AND DEPRESSION: ICD-10-CM

## 2022-01-19 DIAGNOSIS — E78.5 HYPERLIPIDEMIA ASSOCIATED WITH TYPE 2 DIABETES MELLITUS (HCC): ICD-10-CM

## 2022-01-19 DIAGNOSIS — R41.3 MEMORY LOSS: ICD-10-CM

## 2022-01-19 DIAGNOSIS — R79.0 LOW MAGNESIUM LEVEL: ICD-10-CM

## 2022-01-19 DIAGNOSIS — I10 ESSENTIAL HYPERTENSION: ICD-10-CM

## 2022-01-19 PROCEDURE — 99214 OFFICE O/P EST MOD 30 MIN: CPT | Performed by: INTERNAL MEDICINE

## 2022-01-19 RX ORDER — ALENDRONATE SODIUM 70 MG/1
TABLET ORAL
COMMUNITY
Start: 2022-01-17 | End: 2022-12-23 | Stop reason: SDUPTHER

## 2022-01-19 RX ORDER — ROSUVASTATIN CALCIUM 5 MG/1
5 TABLET, COATED ORAL EVERY EVENING
Qty: 30 TABLET | Refills: 3 | Status: SHIPPED | OUTPATIENT
Start: 2022-01-19 | End: 2022-01-20

## 2022-01-19 ASSESSMENT — FIBROSIS 4 INDEX: FIB4 SCORE: 0.4

## 2022-01-19 NOTE — PROGRESS NOTES
Subjective:     CC:   Chief Complaint   Patient presents with   • Follow-Up     medication        HPI:   Stacy presents today to discuss the following issues:    The patient feels well.  No acute complaints.  At the patient's last visit she was started on low-dose citalopram (half pill) and states she is tolerating it well.  Both she and her daughter have noticed a significant improvement in her mood and energy levels.      Past Medical History:   Diagnosis Date   • Anesthesia     PONV; anxious   • Arthritis    • Cancer (HCC) 3/2012    right breast cancer X 2   (1st time Dx 18 yrs ago , had radation and lumpectomy)   • Fall 2021   • Heart burn    • Hepatitis as child   • Hypertension     on metoprolol and amlodipine   • Hypokalemia 2021   • Hypomagnesemia 2021   • Hyponatremia 2021   • Hypophosphatemia 2021   • Indigestion     on Nexium prn   • Leukocytosis 3/2012   • Malignant neoplasm of unspecified site of right female breast (HCC) 2021   • Muscle weakness (generalized) 2021   • Taryn syndrome 2021   • Tachycardia 2022   • Unspecified disorder of thyroid     lobectomy, benign nodule   • Unspecified urinary incontinence        Social History     Tobacco Use   • Smoking status: Former Smoker     Packs/day: 1.00     Years: 20.00     Pack years: 20.00     Quit date: 1997     Years since quittin.0   • Smokeless tobacco: Never Used   Vaping Use   • Vaping Use: Never used   Substance Use Topics   • Alcohol use: Yes     Comment: 2 per week   • Drug use: No       Current Outpatient Medications Ordered in Epic   Medication Sig Dispense Refill   • alendronate (FOSAMAX) 70 MG Tab TAKE ONE TABLET BY MOUTH ONCE A WEEK IN THE MORNING ON AN EMPTY STOMACH WITH 8 OUNCES OF WATER. REMAIN UPRIGHT FOR ONE HOUR AFTER TAKING     • rosuvastatin (CRESTOR) 5 MG Tab Take 1 Tablet by mouth every evening. 30 Tablet 3   • potassium chloride ER (KLOR-CON) 10 MEQ tablet Take 2  "Tablets by mouth every day. 180 Tablet 3   • meclizine (ANTIVERT) 25 MG Tab Take 1 Tablet by mouth 3 times a day as needed for Nausea/Vomiting. 60 Tablet 3   • hydrOXYzine HCl (ATARAX) 25 MG Tab TAKE 1 TABLET BY MOUTH TWICE DAILY FOR ANXIETY 180 Tablet 3   • amLODIPine (NORVASC) 10 MG Tab TAKE 1 TABLET BY MOUTH AT BEDTIME FOR HYPERTENSION. HOLD FOR SYSTOLIC BLOOD PRESSURE LESS THAN 110     • citalopram (CELEXA) 10 MG tablet Take 1 Tablet by mouth every day. 30 Tablet 2   • metaxalone (SKELAXIN) 800 MG Tab Take 1 Tablet by mouth 3 times a day. 90 Tablet    • phosphorus (K-PHOS-NEUTRAL) 250 MG tablet Take 1 Tablet by mouth every 6 hours. 60 Tablet    • glipiZIDE (GLUCOTROL) 5 MG Tab Take 5 mg by mouth 2 times a day.     • donepezil (ARICEPT) 5 MG Tab Take 5 mg by mouth every evening.     • metFORMIN ER (GLUCOPHAGE XR) 500 MG TABLET SR 24 HR Take 500-1,500 mg by mouth 2 times a day. Pt takes 500MG in AM and 1500MG every evening     • furosemide (LASIX) 20 MG Tab Take 20 mg by mouth as needed. Indications: Edema     • metoprolol SR (TOPROL XL) 25 MG TABLET SR 24 HR Take 25 mg by mouth every day.       No current [facility]-ordered facility-administered medications on file.       Allergies:  Patient has no known allergies.    Health Maintenance: Completed    ROS:   Denies any recent fevers or chills. No nausea or vomiting. No chest pains or shortness of breath.      Objective:       Exam:  /74   Pulse 89   Temp 36.3 °C (97.3 °F)   Resp 16   Ht 1.575 m (5' 2\")   Wt 83 kg (183 lb)   SpO2 96%   BMI 33.47 kg/m²  Body mass index is 33.47 kg/m².    Gen: Alert and oriented, No apparent distress.      Assessment & Plan:     76 y.o. female with the following -     Hyperlipidemia associated with type 2 diabetes mellitus (HCC)  With regard to the patient's type 2 diabetes, chronic and ongoing.  The patient takes metformin 500mg in the mornings and 1500 mg ER in the evenings, and glipizide 5 mg twice daily.  Hemoglobin A1c " from 1/7/2022 was 6.1.  -Continue metformin 500 mg ER in the mornings and 1500 mg ER in the evenings  -Continue glipizide 5 mg twice daily    With regard to the patient's hyperlipidemia, patient is not currently on a statin medication.  Lipid panel from 1/7/2022 showed a total cholesterol 298, , HDL 48, triglycerides 299. The 10-year ASCVD risk score (Nolan ALEXUS Jr., et al., 2013) is: 45.4%  -Start trial of low-dose rosuvastatin 5 mg nightly, repeat lipid panel in 3 months  - rosuvastatin (CRESTOR) 5 MG Tab; Take 1 Tablet by mouth every evening.  Dispense: 30 Tablet; Refill: 3     Essential hypertension  This is a chronic medical condition.  Ongoing.  The patient is on metoprolol 25 mg SR daily, amlodipine 10 mg daily, furosemide 20 mg as needed with KCl 20 mEq daily.  The patient denies new headache, chest pain, shortness of breath, lower extremity edema.  Blood pressure is normal today's visit.  -Continue metoprolol 25 mg SR daily  -Continue amlodipine 10 mg daily  -As the patient is no longer experiencing lower extremity edema, will discontinue furosemide, that way she may also be able to stop her potassium supplements in an effort to avoid polypharmacy    Anxiety and depression  This is a chronic condition.    Improved.    At the patient's last visit she was started on escitalopram 5 mg (half 10 mg tablet) and is doing well.  She does not report any side effects, and does report improved mood and energy levels.  She has been continued on the hydroxyzine 25 mg twice daily.  -Continue citalopram 5 mg (half 10 mg tablet) daily  -We will attempt to taper off of the hydroxyzine in an effort to avoid sedation, increased fall risk, and polypharmacy    Low magnesium level  Low serum potassium level  Low serum phosphorus for age  These are chronic conditions.  Likely related to her recent hospitalization.  Also may be secondary to previous furosemide use, which she is no longer taking.  The patient is currently on  KCl 20 mEq daily and K-Phos to 50 mg every 6 hours, as well as magnesium 400 mg daily.  The patient's most recent magnesium level is low at 1.4, potassium level is normal at 4.1, and phosphorus level is normal at 3.1.  -We will stop either the KCl or K-Phos, which ever supplement the patient is most averse to taking due to the large size, and repeat labs in 1 month  -If her electrolytes remain normal, we will stop the other supplement and repeat labs  -Continue magnesium 400 mg daily  - MAGNESIUM; Future  - Basic Metabolic Panel; Future  - PHOSPHORUS; Future    Thrombocytosis  This is an acute condition.  Labs from 1/7/2022 showed an elevated platelet count of 598.  We will check iron levels as an initial evaluation.  - FERRITIN; Future  - IRON/TOTAL IRON BIND; Future    Age-related osteoporosis with current pathological fracture, sequela  This is a chronic medical condition.  Ongoing.  The patient was recently prescribed alendronate 70 mg weekly, but has not yet started the medication.  I do not have the bone density study results available for review.  -Continue alendronate 70 mg weekly    Return in about 3 months (around 4/19/2022) for f/u labs.    Please note that this dictation was created using voice recognition software. I have made every reasonable attempt to correct obvious errors, but I expect that there are errors of grammar and possibly content that I did not discover before finalizing the note.

## 2022-01-20 PROBLEM — M80.00XA AGE-RELATED OSTEOPOROSIS WITH CURRENT PATHOLOGICAL FRACTURE: Status: ACTIVE | Noted: 2022-01-20

## 2022-01-20 PROBLEM — R00.0 TACHYCARDIA: Status: RESOLVED | Noted: 2022-01-18 | Resolved: 2022-01-20

## 2022-01-20 PROBLEM — D75.839 THROMBOCYTOSIS: Status: ACTIVE | Noted: 2022-01-20

## 2022-01-20 PROBLEM — K59.81 OGILVIE SYNDROME: Status: RESOLVED | Noted: 2021-11-26 | Resolved: 2022-01-20
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
## 2022-01-20 PROBLEM — R41.3 MEMORY LOSS: Status: ACTIVE | Noted: 2021-11-30

## 2022-01-20 PROBLEM — R79.0 LOW MAGNESIUM LEVEL: Status: ACTIVE | Noted: 2021-11-23

## 2022-01-20 PROBLEM — M62.81 MUSCLE WEAKNESS (GENERALIZED): Status: RESOLVED | Noted: 2021-12-01 | Resolved: 2022-01-20

## 2022-01-20 PROBLEM — R79.0 LOW SERUM PHOSPHORUS FOR AGE: Status: ACTIVE | Noted: 2022-01-20

## 2022-01-24 ENCOUNTER — HOSPITAL ENCOUNTER (OUTPATIENT)
Dept: LAB | Facility: MEDICAL CENTER | Age: 77
End: 2022-01-24
Attending: NURSE PRACTITIONER
Payer: MEDICARE

## 2022-01-24 ENCOUNTER — HOSPITAL ENCOUNTER (OUTPATIENT)
Dept: LAB | Facility: MEDICAL CENTER | Age: 77
End: 2022-01-24
Attending: INTERNAL MEDICINE
Payer: MEDICARE

## 2022-01-24 DIAGNOSIS — R79.0 LOW MAGNESIUM LEVEL: ICD-10-CM

## 2022-01-24 DIAGNOSIS — E87.6 LOW SERUM POTASSIUM LEVEL: ICD-10-CM

## 2022-01-24 DIAGNOSIS — R79.0 LOW SERUM PHOSPHORUS FOR AGE: ICD-10-CM

## 2022-01-24 DIAGNOSIS — D75.839 THROMBOCYTOSIS: ICD-10-CM

## 2022-01-24 DIAGNOSIS — I10 ESSENTIAL HYPERTENSION: ICD-10-CM

## 2022-01-24 LAB
25(OH)D3 SERPL-MCNC: 40 NG/ML (ref 30–100)
ANION GAP SERPL CALC-SCNC: 14 MMOL/L (ref 7–16)
BUN SERPL-MCNC: 20 MG/DL (ref 8–22)
CALCIUM SERPL-MCNC: 10.2 MG/DL (ref 8.5–10.5)
CHLORIDE SERPL-SCNC: 100 MMOL/L (ref 96–112)
CO2 SERPL-SCNC: 22 MMOL/L (ref 20–33)
CREAT SERPL-MCNC: 0.68 MG/DL (ref 0.5–1.4)
FERRITIN SERPL-MCNC: 38.3 NG/ML (ref 10–291)
GLUCOSE SERPL-MCNC: 122 MG/DL (ref 65–99)
IRON SATN MFR SERPL: 24 % (ref 15–55)
IRON SERPL-MCNC: 90 UG/DL (ref 40–170)
MAGNESIUM SERPL-MCNC: 1.8 MG/DL (ref 1.5–2.5)
PHOSPHATE SERPL-MCNC: 4 MG/DL (ref 2.5–4.5)
POTASSIUM SERPL-SCNC: 4.9 MMOL/L (ref 3.6–5.5)
PTH-INTACT SERPL-MCNC: 65.5 PG/ML (ref 14–72)
SODIUM SERPL-SCNC: 136 MMOL/L (ref 135–145)
TIBC SERPL-MCNC: 381 UG/DL (ref 250–450)
UIBC SERPL-MCNC: 291 UG/DL (ref 110–370)

## 2022-01-24 PROCEDURE — 82306 VITAMIN D 25 HYDROXY: CPT

## 2022-01-24 PROCEDURE — 36415 COLL VENOUS BLD VENIPUNCTURE: CPT

## 2022-01-24 PROCEDURE — 83970 ASSAY OF PARATHORMONE: CPT

## 2022-01-24 PROCEDURE — 83550 IRON BINDING TEST: CPT

## 2022-01-24 PROCEDURE — 83735 ASSAY OF MAGNESIUM: CPT

## 2022-01-24 PROCEDURE — 82728 ASSAY OF FERRITIN: CPT

## 2022-01-24 PROCEDURE — 83540 ASSAY OF IRON: CPT

## 2022-01-24 PROCEDURE — 80048 BASIC METABOLIC PNL TOTAL CA: CPT

## 2022-01-24 PROCEDURE — 84100 ASSAY OF PHOSPHORUS: CPT

## 2022-01-24 NOTE — TELEPHONE ENCOUNTER
----- Message from Stacy Kelley sent at 1/23/2022 11:03 AM PST -----  Regarding: refills  I need refills on both Amlodipine and Metoprolol and I can't refill them on line.

## 2022-01-25 DIAGNOSIS — D75.839 THROMBOCYTOSIS: ICD-10-CM

## 2022-01-25 DIAGNOSIS — E78.5 HYPERLIPIDEMIA ASSOCIATED WITH TYPE 2 DIABETES MELLITUS (HCC): ICD-10-CM

## 2022-01-25 DIAGNOSIS — R79.0 LOW SERUM PHOSPHORUS FOR AGE: ICD-10-CM

## 2022-01-25 DIAGNOSIS — E11.69 HYPERLIPIDEMIA ASSOCIATED WITH TYPE 2 DIABETES MELLITUS (HCC): ICD-10-CM

## 2022-01-25 DIAGNOSIS — R79.0 LOW MAGNESIUM LEVEL: ICD-10-CM

## 2022-01-25 RX ORDER — METOPROLOL SUCCINATE 25 MG/1
25 TABLET, EXTENDED RELEASE ORAL DAILY
Qty: 90 TABLET | Refills: 3 | Status: SHIPPED | OUTPATIENT
Start: 2022-01-25 | End: 2023-01-09

## 2022-01-25 RX ORDER — AMLODIPINE BESYLATE 10 MG/1
TABLET ORAL
Qty: 90 TABLET | Refills: 3 | Status: SHIPPED | OUTPATIENT
Start: 2022-01-25 | End: 2023-01-09

## 2022-02-03 DIAGNOSIS — S32.010A CLOSED WEDGE COMPRESSION FRACTURE OF L1 VERTEBRA, INITIAL ENCOUNTER (HCC): ICD-10-CM

## 2022-02-03 RX ORDER — METAXALONE 800 MG/1
800 TABLET ORAL 3 TIMES DAILY
Qty: 90 TABLET | Refills: 3 | Status: SHIPPED | OUTPATIENT
Start: 2022-02-03 | End: 2023-01-10

## 2022-02-15 RX ORDER — GLIPIZIDE 5 MG/1
5 TABLET ORAL 2 TIMES DAILY
Qty: 180 TABLET | Refills: 3 | Status: SHIPPED | OUTPATIENT
Start: 2022-02-15 | End: 2023-02-11 | Stop reason: SDUPTHER

## 2022-02-22 DIAGNOSIS — Z91.81 AT RISK FOR FALLS: ICD-10-CM

## 2022-04-07 RX ORDER — ROSUVASTATIN CALCIUM 5 MG/1
5 TABLET, COATED ORAL EVERY EVENING
Qty: 90 TABLET | Refills: 3 | Status: SHIPPED | OUTPATIENT
Start: 2022-04-07 | End: 2023-04-01 | Stop reason: SDUPTHER

## 2022-04-11 ENCOUNTER — TELEPHONE (OUTPATIENT)
Dept: MEDICAL GROUP | Facility: PHYSICIAN GROUP | Age: 77
End: 2022-04-11
Payer: COMMERCIAL

## 2022-04-11 NOTE — TELEPHONE ENCOUNTER
Pharmacy faxed over stating that insurance does not cover this medication. The preferred alternative is Baclofen, Cyclobenzaprine HCL, Carisoprodol.

## 2022-04-12 RX ORDER — CYCLOBENZAPRINE HCL 5 MG
5 TABLET ORAL 3 TIMES DAILY PRN
Qty: 90 TABLET | Refills: 0 | Status: SHIPPED | OUTPATIENT
Start: 2022-04-12 | End: 2022-07-12 | Stop reason: SDUPTHER

## 2022-05-09 DIAGNOSIS — R73.01 ELEVATED FASTING GLUCOSE: ICD-10-CM

## 2022-05-10 RX ORDER — METFORMIN HYDROCHLORIDE 500 MG/1
TABLET, EXTENDED RELEASE ORAL
Qty: 360 TABLET | Refills: 3 | Status: SHIPPED | OUTPATIENT
Start: 2022-05-10 | End: 2023-02-11 | Stop reason: SDUPTHER

## 2022-07-13 RX ORDER — CYCLOBENZAPRINE HCL 5 MG
5 TABLET ORAL 3 TIMES DAILY PRN
Qty: 90 TABLET | Refills: 0 | Status: SHIPPED | OUTPATIENT
Start: 2022-07-13 | End: 2022-10-14 | Stop reason: SDUPTHER

## 2022-10-14 RX ORDER — CYCLOBENZAPRINE HCL 5 MG
5 TABLET ORAL 3 TIMES DAILY PRN
Qty: 90 TABLET | Refills: 0 | Status: SHIPPED | OUTPATIENT
Start: 2022-10-14 | End: 2023-01-10

## 2022-10-20 NOTE — PROGRESS NOTES
Subjective:     CC:   Chief Complaint   Patient presents with    Medication Follow-up     Go to urinate often, for years          HPI:   Stacy presents today for follow-up visit and to discuss the following issues:    OAB (overactive bladder)  The patient is reporting bothersome nocturia.  She states it will vary depending upon the night but she will either go 1 time per night or up to 5 times per night.  She and her family are worried about falls when going to the bathroom.  She has had urinary frequency at night for many years.  She is interested in trying a medication for this condition.    Anxiety and depression  Reports symptoms are well controlled on her citalopram 10 mg daily and hydroxyzine 25 mg nightly.     Electrolyte abnormality  Discussed with the patient that she can stop her potassium and phosphorus, we will check her labs.  This was likely due to her admission lab abnormalities.    Need for vaccination  - INFLUENZA VACCINE, HIGH DOSE (65+ ONLY)  The patient would like to receive her flu vaccine at today's visit    Past Medical History:   Diagnosis Date    Anesthesia     PONV; anxious    Arthritis     Cancer (HCC) 3/2012    right breast cancer X 2   (1st time Dx 18 yrs ago , had radation and lumpectomy)    Closed compression fracture of body of L1 vertebra (HCC) 11/21/2021    Fall 11/23/2021    Heart burn     Hepatitis as child    Hypertension     on metoprolol and amlodipine    Hypokalemia 11/23/2021    Hypomagnesemia 11/23/2021    Hyponatremia 11/21/2021    Hypophosphatemia 11/23/2021    Indigestion     on Nexium prn    Leukocytosis 3/2012    Low magnesium level 11/23/2021    Low serum phosphorus for age 1/20/2022    Low serum potassium level 11/23/2021    Malignant neoplasm of unspecified site of right female breast (HCC) 11/30/2021    Muscle weakness (generalized) 12/1/2021    [place] syndrome 11/26/2021    Tachycardia 1/18/2022    Unspecified disorder of thyroid     lobectomy, benign nodule     Unspecified urinary incontinence        Social History     Tobacco Use    Smoking status: Former     Packs/day: 1.00     Years: 20.00     Pack years: 20.00     Types: Cigarettes     Quit date: 1997     Years since quittin.8    Smokeless tobacco: Never   Vaping Use    Vaping Use: Never used   Substance Use Topics    Alcohol use: Yes     Comment: 2 per week    Drug use: No       Current Outpatient Medications Ordered in Epic   Medication Sig Dispense Refill    Mirabegron ER 25 MG TABLET SR 24 HR Take 25 mg by mouth every day. 90 Tablet 3    cyclobenzaprine (FLEXERIL) 5 mg tablet Take 1 Tablet by mouth 3 times a day as needed for Muscle Spasms. 90 Tablet 0    metFORMIN ER (GLUCOPHAGE XR) 500 MG TABLET SR 24 HR Take 500 mg in the morning and 1500 mg in the evening 360 Tablet 3    rosuvastatin (CRESTOR) 5 MG Tab Take 1 Tablet by mouth every evening. 90 Tablet 3    citalopram (CELEXA) 10 MG tablet Take 1 Tablet by mouth every day. 90 Tablet 3    glipiZIDE (GLUCOTROL) 5 MG Tab Take 1 Tablet by mouth 2 times a day. 180 Tablet 3    metaxalone (SKELAXIN) 800 MG Tab Take 1 Tablet by mouth 3 times a day. 90 Tablet 3    metoprolol SR (TOPROL XL) 25 MG TABLET SR 24 HR Take 1 Tablet by mouth every day. 90 Tablet 3    amLODIPine (NORVASC) 10 MG Tab TAKE 1 TABLET BY MOUTH AT BEDTIME FOR HYPERTENSION. HOLD FOR SYSTOLIC BLOOD PRESSURE LESS THAN 110 90 Tablet 3    alendronate (FOSAMAX) 70 MG Tab TAKE ONE TABLET BY MOUTH ONCE A WEEK IN THE MORNING ON AN EMPTY STOMACH WITH 8 OUNCES OF WATER. REMAIN UPRIGHT FOR ONE HOUR AFTER TAKING      potassium chloride ER (KLOR-CON) 10 MEQ tablet Take 2 Tablets by mouth every day. 180 Tablet 3    meclizine (ANTIVERT) 25 MG Tab Take 1 Tablet by mouth 3 times a day as needed for Nausea/Vomiting. 60 Tablet 3    hydrOXYzine HCl (ATARAX) 25 MG Tab TAKE 1 TABLET BY MOUTH TWICE DAILY FOR ANXIETY 180 Tablet 3    phosphorus (K-PHOS-NEUTRAL) 250 MG tablet Take 1 Tablet by mouth every 6 hours. 60  "Tablet     furosemide (LASIX) 20 MG Tab Take 20 mg by mouth as needed. Indications: Edema       No current Epic-ordered facility-administered medications on file.       Allergies:  Patient has no known allergies.    Health Maintenance: Completed    Review of Systems:   Denies any recent fevers or chills. No nausea or vomiting. No chest pains or shortness of breath.      Objective:       Exam:  /78 (BP Location: Right arm, Patient Position: Sitting, BP Cuff Size: Adult)   Pulse (!) 117   Temp 36.7 °C (98 °F) (Temporal)   Resp 18   Ht 1.575 m (5' 2\")   Wt 88.5 kg (195 lb)   SpO2 93%   BMI 35.67 kg/m²  Body mass index is 35.67 kg/m².    Gen: Alert and oriented, No apparent distress.  Lungs: Normal effort, CTA bilaterally, no wheezes, rhonchi, or rales  CV: Regular rate and rhythm. No murmurs, rubs, or gallops.  Ext: No clubbing, cyanosis, edema.      Assessment & Plan:     77 y.o. female with the following -     OAB (overactive bladder)  Acute medical condition.  Progressive.  The patient is reporting bothersome nocturia.  She states it will vary depending upon the night but she will either go 1 time per night or up to 5 times per night.  She and her family are worried about falls when going to the bathroom.  -Will start trial of Myrbetriq 25 mg daily  - Mirabegron ER 25 MG TABLET SR 24 HR; Take 25 mg by mouth every day.  Dispense: 90 Tablet; Refill: 3    Hyperlipidemia associated with type 2 diabetes mellitus (HCC)  With regard to the patient's type 2 diabetes, chronic medical condition.  Hemoglobin A1c from 1/7/2022 was 6.1.  The patient is due for updated labs.  -Continue metformin 500 mg ER in the mornings and 1500 mg ER in the evenings, and glipizide 5 mg twice daily  - Comp Metabolic Panel; Future  - HEMOGLOBIN A1C; Future  - MICROALBUMIN CREAT RATIO URINE; Future    With regard to the patient's hyperlipidemia, chronic medical condition.  Lipid panel from 1/7/2022 showed a total cholesterol 298, LDL " 190, HDL 48, triglycerides 299. The 10-year ASCVD risk score (Gause ALEXUS Jr., et al., 2013) is: 45.4%.  Following these lab results the patient was started on rosuvastatin 5 mg nightly.  The patient is due for updated labs.  -Continue rosuvastatin 5 mg nightly  - Comp Metabolic Panel; Future  - Lipid Profile; Future    Primary hypertension  Chronic medical condition.  Ongoing and well controlled.  Blood pressure today's visit is 120/78.  The patient denies new headache, chest pain, shortness of breath, lower extremity edema.   -Continue metoprolol 25 mg SR daily and amlodipine 10 mg daily     Anxiety and depression  Chronic medical condition.  Ongoing and well controlled.    -Continue citalopram 10 mg daily and hydroxyzine 25 mg nightly as needed     Thrombocytosis  Chronic medical condition.  Labs from 1/7/2022 showed an elevated platelet count of 598.  The patient is due for updated labs.- CBC WITH DIFFERENTIAL; Future  - FERRITIN; Future  - IRON/TOTAL IRON BIND; Future     Age-related osteoporosis with current pathological fracture, sequela  Chronic medical condition.  Ongoing.  The patient had a bone density study around 11/2021, I do not have those results available for review.  She was subsequently started on alendronate 70 mg weekly.    -Continue alendronate 70 mg weekly  -Repeat bone density due 11/2023    Electrolyte abnormality  - MAGNESIUM; Future  - PHOSPHORUS; Future    Thyroid disorder screen  - TSH WITH REFLEX TO FT4; Future    Need for vaccination  - INFLUENZA VACCINE, HIGH DOSE (65+ ONLY)    Return in about 3 months (around 1/21/2023) for f/u labs.    Please note that this dictation was created using voice recognition software. I have made every reasonable attempt to correct obvious errors, but I expect that there are errors of grammar and possibly content that I did not discover before finalizing the note.

## 2022-10-21 ENCOUNTER — OFFICE VISIT (OUTPATIENT)
Dept: MEDICAL GROUP | Facility: PHYSICIAN GROUP | Age: 77
End: 2022-10-21
Payer: MEDICARE

## 2022-10-21 VITALS
HEART RATE: 117 BPM | TEMPERATURE: 98 F | DIASTOLIC BLOOD PRESSURE: 78 MMHG | WEIGHT: 195 LBS | HEIGHT: 62 IN | RESPIRATION RATE: 18 BRPM | BODY MASS INDEX: 35.88 KG/M2 | OXYGEN SATURATION: 93 % | SYSTOLIC BLOOD PRESSURE: 120 MMHG

## 2022-10-21 DIAGNOSIS — M80.00XS AGE-RELATED OSTEOPOROSIS WITH CURRENT PATHOLOGICAL FRACTURE, SEQUELA: ICD-10-CM

## 2022-10-21 DIAGNOSIS — F32.A ANXIETY AND DEPRESSION: ICD-10-CM

## 2022-10-21 DIAGNOSIS — N32.81 OAB (OVERACTIVE BLADDER): ICD-10-CM

## 2022-10-21 DIAGNOSIS — E87.8 ELECTROLYTE ABNORMALITY: ICD-10-CM

## 2022-10-21 DIAGNOSIS — F41.9 ANXIETY AND DEPRESSION: ICD-10-CM

## 2022-10-21 DIAGNOSIS — I10 PRIMARY HYPERTENSION: ICD-10-CM

## 2022-10-21 DIAGNOSIS — E11.69 HYPERLIPIDEMIA ASSOCIATED WITH TYPE 2 DIABETES MELLITUS (HCC): ICD-10-CM

## 2022-10-21 DIAGNOSIS — D75.839 THROMBOCYTOSIS: ICD-10-CM

## 2022-10-21 DIAGNOSIS — E78.5 HYPERLIPIDEMIA ASSOCIATED WITH TYPE 2 DIABETES MELLITUS (HCC): ICD-10-CM

## 2022-10-21 DIAGNOSIS — Z13.29 THYROID DISORDER SCREEN: ICD-10-CM

## 2022-10-21 DIAGNOSIS — Z23 NEED FOR VACCINATION: ICD-10-CM

## 2022-10-21 PROBLEM — R79.0 LOW SERUM PHOSPHORUS FOR AGE: Status: RESOLVED | Noted: 2022-01-20 | Resolved: 2022-10-21

## 2022-10-21 PROBLEM — R79.0 LOW MAGNESIUM LEVEL: Status: RESOLVED | Noted: 2021-11-23 | Resolved: 2022-10-21

## 2022-10-21 PROBLEM — E87.6 LOW SERUM POTASSIUM LEVEL: Status: RESOLVED | Noted: 2021-11-23 | Resolved: 2022-10-21

## 2022-10-21 PROBLEM — S32.010A CLOSED COMPRESSION FRACTURE OF BODY OF L1 VERTEBRA (HCC): Status: RESOLVED | Noted: 2021-11-21 | Resolved: 2022-10-21

## 2022-10-21 PROCEDURE — G0008 ADMIN INFLUENZA VIRUS VAC: HCPCS | Performed by: INTERNAL MEDICINE

## 2022-10-21 PROCEDURE — 99214 OFFICE O/P EST MOD 30 MIN: CPT | Mod: 25 | Performed by: INTERNAL MEDICINE

## 2022-10-21 PROCEDURE — 90662 IIV NO PRSV INCREASED AG IM: CPT | Performed by: INTERNAL MEDICINE

## 2022-10-21 ASSESSMENT — FIBROSIS 4 INDEX: FIB4 SCORE: 0.4

## 2022-10-25 PROBLEM — N32.81 OAB (OVERACTIVE BLADDER): Status: ACTIVE | Noted: 2022-10-25

## 2022-11-03 ENCOUNTER — PATIENT MESSAGE (OUTPATIENT)
Dept: HEALTH INFORMATION MANAGEMENT | Facility: OTHER | Age: 77
End: 2022-11-03

## 2022-12-23 ENCOUNTER — PATIENT MESSAGE (OUTPATIENT)
Dept: MEDICAL GROUP | Facility: PHYSICIAN GROUP | Age: 77
End: 2022-12-23
Payer: MEDICARE

## 2022-12-23 DIAGNOSIS — M80.00XS AGE-RELATED OSTEOPOROSIS WITH CURRENT PATHOLOGICAL FRACTURE, SEQUELA: ICD-10-CM

## 2022-12-23 RX ORDER — ALENDRONATE SODIUM 70 MG/1
TABLET ORAL
Qty: 4 TABLET | Refills: 0 | Status: SHIPPED | OUTPATIENT
Start: 2022-12-23 | End: 2023-01-21 | Stop reason: SDUPTHER

## 2023-01-09 DIAGNOSIS — I10 ESSENTIAL HYPERTENSION: ICD-10-CM

## 2023-01-09 RX ORDER — METOPROLOL SUCCINATE 25 MG/1
25 TABLET, EXTENDED RELEASE ORAL DAILY
Qty: 90 TABLET | Refills: 3 | Status: SHIPPED | OUTPATIENT
Start: 2023-01-09 | End: 2024-01-10 | Stop reason: SDUPTHER

## 2023-01-09 RX ORDER — AMLODIPINE BESYLATE 10 MG/1
TABLET ORAL
Qty: 90 TABLET | Refills: 3 | Status: SHIPPED | OUTPATIENT
Start: 2023-01-09 | End: 2024-01-10 | Stop reason: SDUPTHER

## 2023-01-10 RX ORDER — CYCLOBENZAPRINE HCL 5 MG
TABLET ORAL
Qty: 90 TABLET | Refills: 3 | Status: SHIPPED | OUTPATIENT
Start: 2023-01-10 | End: 2023-04-12 | Stop reason: SDUPTHER

## 2023-01-21 DIAGNOSIS — M80.00XS AGE-RELATED OSTEOPOROSIS WITH CURRENT PATHOLOGICAL FRACTURE, SEQUELA: ICD-10-CM

## 2023-01-23 DIAGNOSIS — M80.00XS AGE-RELATED OSTEOPOROSIS WITH CURRENT PATHOLOGICAL FRACTURE, SEQUELA: ICD-10-CM

## 2023-01-23 RX ORDER — ALENDRONATE SODIUM 70 MG/1
TABLET ORAL
Qty: 12 TABLET | OUTPATIENT
Start: 2023-01-23

## 2023-01-23 RX ORDER — ALENDRONATE SODIUM 70 MG/1
TABLET ORAL
Qty: 4 TABLET | Refills: 0 | Status: SHIPPED | OUTPATIENT
Start: 2023-01-23 | End: 2023-02-22 | Stop reason: SDUPTHER

## 2023-03-09 ENCOUNTER — HOSPITAL ENCOUNTER (OUTPATIENT)
Dept: LAB | Facility: MEDICAL CENTER | Age: 78
End: 2023-03-09
Attending: INTERNAL MEDICINE
Payer: MEDICARE

## 2023-03-09 DIAGNOSIS — E78.5 HYPERLIPIDEMIA ASSOCIATED WITH TYPE 2 DIABETES MELLITUS (HCC): ICD-10-CM

## 2023-03-09 DIAGNOSIS — D75.839 THROMBOCYTOSIS: ICD-10-CM

## 2023-03-09 DIAGNOSIS — E11.69 HYPERLIPIDEMIA ASSOCIATED WITH TYPE 2 DIABETES MELLITUS (HCC): ICD-10-CM

## 2023-03-09 DIAGNOSIS — Z13.29 THYROID DISORDER SCREEN: ICD-10-CM

## 2023-03-09 DIAGNOSIS — E87.8 ELECTROLYTE ABNORMALITY: ICD-10-CM

## 2023-03-09 LAB
ALBUMIN SERPL BCP-MCNC: 4.6 G/DL (ref 3.2–4.9)
ALBUMIN/GLOB SERPL: 1.6 G/DL
ALP SERPL-CCNC: 75 U/L (ref 30–99)
ALT SERPL-CCNC: 30 U/L (ref 2–50)
ANION GAP SERPL CALC-SCNC: 11 MMOL/L (ref 7–16)
AST SERPL-CCNC: 17 U/L (ref 12–45)
BASOPHILS # BLD AUTO: 0.8 % (ref 0–1.8)
BASOPHILS # BLD: 0.1 K/UL (ref 0–0.12)
BILIRUB SERPL-MCNC: 0.2 MG/DL (ref 0.1–1.5)
BUN SERPL-MCNC: 25 MG/DL (ref 8–22)
CALCIUM ALBUM COR SERPL-MCNC: 9.6 MG/DL (ref 8.5–10.5)
CALCIUM SERPL-MCNC: 10.1 MG/DL (ref 8.5–10.5)
CHLORIDE SERPL-SCNC: 100 MMOL/L (ref 96–112)
CHOLEST SERPL-MCNC: 204 MG/DL (ref 100–199)
CO2 SERPL-SCNC: 22 MMOL/L (ref 20–33)
CREAT SERPL-MCNC: 0.59 MG/DL (ref 0.5–1.4)
EOSINOPHIL # BLD AUTO: 0.2 K/UL (ref 0–0.51)
EOSINOPHIL NFR BLD: 1.5 % (ref 0–6.9)
ERYTHROCYTE [DISTWIDTH] IN BLOOD BY AUTOMATED COUNT: 45.1 FL (ref 35.9–50)
EST. AVERAGE GLUCOSE BLD GHB EST-MCNC: 151 MG/DL
FASTING STATUS PATIENT QL REPORTED: NORMAL
FERRITIN SERPL-MCNC: 55 NG/ML (ref 10–291)
GFR SERPLBLD CREATININE-BSD FMLA CKD-EPI: 91 ML/MIN/1.73 M 2
GLOBULIN SER CALC-MCNC: 2.9 G/DL (ref 1.9–3.5)
GLUCOSE SERPL-MCNC: 136 MG/DL (ref 65–99)
HBA1C MFR BLD: 6.9 % (ref 4–5.6)
HCT VFR BLD AUTO: 46 % (ref 37–47)
HDLC SERPL-MCNC: 52 MG/DL
HGB BLD-MCNC: 15.1 G/DL (ref 12–16)
IMM GRANULOCYTES # BLD AUTO: 0.11 K/UL (ref 0–0.11)
IMM GRANULOCYTES NFR BLD AUTO: 0.8 % (ref 0–0.9)
IRON SATN MFR SERPL: 25 % (ref 15–55)
IRON SERPL-MCNC: 104 UG/DL (ref 40–170)
LDLC SERPL CALC-MCNC: 90 MG/DL
LYMPHOCYTES # BLD AUTO: 3.26 K/UL (ref 1–4.8)
LYMPHOCYTES NFR BLD: 24.6 % (ref 22–41)
MAGNESIUM SERPL-MCNC: 1.7 MG/DL (ref 1.5–2.5)
MCH RBC QN AUTO: 31.8 PG (ref 27–33)
MCHC RBC AUTO-ENTMCNC: 32.8 G/DL (ref 33.6–35)
MCV RBC AUTO: 96.8 FL (ref 81.4–97.8)
MONOCYTES # BLD AUTO: 0.73 K/UL (ref 0–0.85)
MONOCYTES NFR BLD AUTO: 5.5 % (ref 0–13.4)
NEUTROPHILS # BLD AUTO: 8.84 K/UL (ref 2–7.15)
NEUTROPHILS NFR BLD: 66.8 % (ref 44–72)
NRBC # BLD AUTO: 0 K/UL
NRBC BLD-RTO: 0 /100 WBC
PHOSPHATE SERPL-MCNC: 3.5 MG/DL (ref 2.5–4.5)
PLATELET # BLD AUTO: 583 K/UL (ref 164–446)
PMV BLD AUTO: 9.3 FL (ref 9–12.9)
POTASSIUM SERPL-SCNC: 4.5 MMOL/L (ref 3.6–5.5)
PROT SERPL-MCNC: 7.5 G/DL (ref 6–8.2)
RBC # BLD AUTO: 4.75 M/UL (ref 4.2–5.4)
SODIUM SERPL-SCNC: 133 MMOL/L (ref 135–145)
TIBC SERPL-MCNC: 412 UG/DL (ref 250–450)
TRIGL SERPL-MCNC: 310 MG/DL (ref 0–149)
TSH SERPL DL<=0.005 MIU/L-ACNC: 1.38 UIU/ML (ref 0.38–5.33)
UIBC SERPL-MCNC: 308 UG/DL (ref 110–370)
WBC # BLD AUTO: 13.2 K/UL (ref 4.8–10.8)

## 2023-03-09 PROCEDURE — 85025 COMPLETE CBC W/AUTO DIFF WBC: CPT

## 2023-03-09 PROCEDURE — 84100 ASSAY OF PHOSPHORUS: CPT

## 2023-03-09 PROCEDURE — 83550 IRON BINDING TEST: CPT

## 2023-03-09 PROCEDURE — 84443 ASSAY THYROID STIM HORMONE: CPT

## 2023-03-09 PROCEDURE — 83036 HEMOGLOBIN GLYCOSYLATED A1C: CPT | Mod: GA

## 2023-03-09 PROCEDURE — 36415 COLL VENOUS BLD VENIPUNCTURE: CPT

## 2023-03-09 PROCEDURE — 80053 COMPREHEN METABOLIC PANEL: CPT

## 2023-03-09 PROCEDURE — 83540 ASSAY OF IRON: CPT

## 2023-03-09 PROCEDURE — 83735 ASSAY OF MAGNESIUM: CPT

## 2023-03-09 PROCEDURE — 80061 LIPID PANEL: CPT

## 2023-03-09 PROCEDURE — 82728 ASSAY OF FERRITIN: CPT

## 2023-03-13 ENCOUNTER — HOSPITAL ENCOUNTER (OUTPATIENT)
Facility: MEDICAL CENTER | Age: 78
End: 2023-03-13
Attending: INTERNAL MEDICINE
Payer: COMMERCIAL

## 2023-03-13 PROCEDURE — 82570 ASSAY OF URINE CREATININE: CPT

## 2023-03-13 PROCEDURE — 82043 UR ALBUMIN QUANTITATIVE: CPT

## 2023-03-14 LAB
CREAT UR-MCNC: 53.31 MG/DL
MICROALBUMIN UR-MCNC: 8.5 MG/DL
MICROALBUMIN/CREAT UR: 159 MG/G (ref 0–30)

## 2023-03-31 DIAGNOSIS — F41.9 ANXIETY AND DEPRESSION: ICD-10-CM

## 2023-03-31 DIAGNOSIS — F32.A ANXIETY AND DEPRESSION: ICD-10-CM

## 2023-04-01 RX ORDER — HYDROXYZINE HYDROCHLORIDE 25 MG/1
TABLET, FILM COATED ORAL
Qty: 180 TABLET | Refills: 3 | Status: SHIPPED | OUTPATIENT
Start: 2023-04-01

## 2023-04-01 RX ORDER — CITALOPRAM HYDROBROMIDE 10 MG/1
10 TABLET ORAL DAILY
Qty: 90 TABLET | Refills: 3 | Status: SHIPPED | OUTPATIENT
Start: 2023-04-01 | End: 2023-04-12

## 2023-04-03 RX ORDER — ROSUVASTATIN CALCIUM 5 MG/1
5 TABLET, COATED ORAL EVERY EVENING
Qty: 90 TABLET | Refills: 3 | Status: SHIPPED | OUTPATIENT
Start: 2023-04-03 | End: 2024-03-12

## 2023-04-12 ENCOUNTER — OFFICE VISIT (OUTPATIENT)
Dept: MEDICAL GROUP | Facility: PHYSICIAN GROUP | Age: 78
End: 2023-04-12
Payer: MEDICARE

## 2023-04-12 VITALS
BODY MASS INDEX: 34.85 KG/M2 | DIASTOLIC BLOOD PRESSURE: 60 MMHG | OXYGEN SATURATION: 94 % | TEMPERATURE: 95 F | WEIGHT: 196.7 LBS | HEART RATE: 102 BPM | HEIGHT: 63 IN | SYSTOLIC BLOOD PRESSURE: 126 MMHG

## 2023-04-12 DIAGNOSIS — F32.A ANXIETY AND DEPRESSION: ICD-10-CM

## 2023-04-12 DIAGNOSIS — F41.9 ANXIETY AND DEPRESSION: ICD-10-CM

## 2023-04-12 DIAGNOSIS — E78.5 HYPERLIPIDEMIA ASSOCIATED WITH TYPE 2 DIABETES MELLITUS (HCC): ICD-10-CM

## 2023-04-12 DIAGNOSIS — N32.81 OAB (OVERACTIVE BLADDER): ICD-10-CM

## 2023-04-12 DIAGNOSIS — J20.9 ACUTE BRONCHITIS, UNSPECIFIED ORGANISM: ICD-10-CM

## 2023-04-12 DIAGNOSIS — E11.69 HYPERLIPIDEMIA ASSOCIATED WITH TYPE 2 DIABETES MELLITUS (HCC): ICD-10-CM

## 2023-04-12 DIAGNOSIS — Z13.0 SCREENING FOR DEFICIENCY ANEMIA: ICD-10-CM

## 2023-04-12 PROCEDURE — 99214 OFFICE O/P EST MOD 30 MIN: CPT | Performed by: INTERNAL MEDICINE

## 2023-04-12 RX ORDER — CITALOPRAM 20 MG/1
20 TABLET ORAL DAILY
Qty: 90 TABLET | Refills: 3 | Status: SHIPPED | OUTPATIENT
Start: 2023-04-12

## 2023-04-12 RX ORDER — BENZONATATE 100 MG/1
100 CAPSULE ORAL 3 TIMES DAILY PRN
Qty: 60 CAPSULE | Refills: 0 | Status: SHIPPED | OUTPATIENT
Start: 2023-04-12 | End: 2023-10-31

## 2023-04-12 RX ORDER — METHYLPREDNISOLONE 4 MG/1
TABLET ORAL
Qty: 21 TABLET | Refills: 0 | Status: SHIPPED | OUTPATIENT
Start: 2023-04-12 | End: 2023-10-31

## 2023-04-12 RX ORDER — CYCLOBENZAPRINE HCL 5 MG
TABLET ORAL
Qty: 90 TABLET | Refills: 0 | Status: SHIPPED | OUTPATIENT
Start: 2023-04-12 | End: 2023-10-31

## 2023-04-12 RX ORDER — AZITHROMYCIN 250 MG/1
TABLET, FILM COATED ORAL
Qty: 6 TABLET | Refills: 0 | Status: SHIPPED | OUTPATIENT
Start: 2023-04-12 | End: 2023-10-31

## 2023-04-12 ASSESSMENT — FIBROSIS 4 INDEX: FIB4 SCORE: 0.43

## 2023-04-12 NOTE — PROGRESS NOTES
Subjective:     CC:   Chief Complaint   Patient presents with    Cough     4 days ago was tested negative for covid now saying ribs hurt and wheezing.          HPI:   Stacy presents today for follow-up visit and to discuss the following issues:    1. Acute bronchitis, unspecified organism  The patient reports a 4-day history of productive cough and subjective fevers.  Home COVID-19 testing was negative.  She also reports wheezing with shortness of breath.  The patient denies headaches, sore throat, malaise, and body aches.    2. Hyperlipidemia associated with type 2 diabetes mellitus (HCC)  The patient is currently taking metformin 500 mg in the mornings and 1500 mg in the evenings and glipizide 5 mg twice daily.  She is taking rosuvastatin 5 mg nightly for cholesterol.  Reviewed the patient's recent labs from 3/9/2023 which showed a hemoglobin A1c of 6.9, up from a previous value of 6.1.  Urine microalbumin creatinine ratio was elevated at 159.  Also reviewed the patient's recent lipid panel which showed a total cholesterol 204, LDL 90, HDL 52, triglycerides 310.    3. Anxiety and depression  The patient's daughter has noticed worsening anxiety in her mother.  She has gone from taking hydroxyzine at night to twice daily.  She is currently taking citalopram 10 mg daily.      4. OAB (overactive bladder)  The patient's daughter is concerned about her increased risk for falls when she gets up to urinate frequently at night.  She previously tried Myrbetriq 25 mg daily but did not find it very effective.  She would like to try increasing the dose to see if she can get better improvement with the higher dose.      Past Medical History:   Diagnosis Date    Anesthesia     PONV; anxious    Arthritis     Cancer (HCC) 3/2012    right breast cancer X 2   (1st time Dx 18 yrs ago , had radation and lumpectomy)    Closed compression fracture of body of L1 vertebra (HCC) 11/21/2021    Fall 11/23/2021    Heart burn     Hepatitis  as child    Hypertension     on metoprolol and amlodipine    Hypokalemia 2021    Hypomagnesemia 2021    Hyponatremia 2021    Hypophosphatemia 2021    Indigestion     on Nexium prn    Leukocytosis 3/2012    Low magnesium level 2021    Low serum phosphorus for age 2022    Low serum potassium level 2021    Malignant neoplasm of unspecified site of right female breast (HCC) 2021    Muscle weakness (generalized) 2021    [place] syndrome 2021    Tachycardia 2022    Unspecified disorder of thyroid     lobectomy, benign nodule    Unspecified urinary incontinence        Social History     Tobacco Use    Smoking status: Former     Packs/day: 1.00     Years: 20.00     Pack years: 20.00     Types: Cigarettes     Quit date: 1997     Years since quittin.3    Smokeless tobacco: Never   Vaping Use    Vaping Use: Never used   Substance Use Topics    Alcohol use: Yes     Comment: 2 per week    Drug use: No       Current Outpatient Medications Ordered in Epic   Medication Sig Dispense Refill    citalopram (CELEXA) 20 MG Tab Take 1 Tablet by mouth every day. 90 Tablet 3    Mirabegron ER 50 MG TABLET SR 24 HR Take 50 mg by mouth every day. 30 Tablet 3    azithromycin (ZITHROMAX) 250 MG Tab Take two tablets on the first day, then one tablet daily for 4 days. 6 Tablet 0    methylPREDNISolone (MEDROL DOSEPAK) 4 MG Tablet Therapy Pack As directed on the packaging label. 21 Tablet 0    benzonatate (TESSALON) 100 MG Cap Take 1 Capsule by mouth 3 times a day as needed for Cough. 60 Capsule 0    rosuvastatin (CRESTOR) 5 MG Tab Take 1 Tablet by mouth every evening. 90 Tablet 3    hydrOXYzine HCl (ATARAX) 25 MG Tab TAKE 1 TABLET BY MOUTH TWICE DAILY FOR ANXIETY 180 Tablet 3    alendronate (FOSAMAX) 70 MG Tab TAKE ONE TABLET BY MOUTH ONCE A WEEK IN THE MORNING ON AN EMPTY STOMACH WITH 8 OUNCES OF WATER. REMAIN UPRIGHT FOR ONE HOUR AFTER TAKING 12 Tablet 3    glipiZIDE  "(GLUCOTROL) 5 MG Tab Take 1 Tablet by mouth 2 times a day. NEEDS LABS AND FOLLOW UP PRIOR TO ADDITIONAL REFILLS 180 Tablet 0    metFORMIN ER (GLUCOPHAGE XR) 500 MG TABLET SR 24 HR NEEDS LABS AND FOLLOW UP PRIOR TO ADDITIONAL REFILLS Take 500 mg in the morning and 1500 mg in the evening 360 Tablet 0    amLODIPine (NORVASC) 10 MG Tab TAKE ONE TABLET BY MOUTH AT BEDTIME FOR HYPERTENSION HOLD FOR SYSTOLIC BLOOD PRESSURE LESS THAN 110 90 Tablet 3    metoprolol SR (TOPROL XL) 25 MG TABLET SR 24 HR TAKE 1 TABLET BY MOUTH EVERY DAY 90 Tablet 3    potassium chloride ER (KLOR-CON) 10 MEQ tablet Take 2 Tablets by mouth every day. 180 Tablet 3    meclizine (ANTIVERT) 25 MG Tab Take 1 Tablet by mouth 3 times a day as needed for Nausea/Vomiting. 60 Tablet 3    phosphorus (K-PHOS-NEUTRAL) 250 MG tablet Take 1 Tablet by mouth every 6 hours. 60 Tablet     furosemide (LASIX) 20 MG Tab Take 20 mg by mouth as needed. Indications: Edema      cyclobenzaprine (FLEXERIL) 5 mg tablet TAKE 1 TABLET BY MOUTH THREE TIMES DAILY AS NEEDED FOR MUSCLE SPASMS 90 Tablet 0     No current Epic-ordered facility-administered medications on file.       Allergies:  Patient has no known allergies.    Health Maintenance: Completed    Review of Systems:  No fevers or chills. No cough, chest pain, or shortness of breath.       Objective:       Exam:  /60   Pulse (!) 102   Temp (!) 35 °C (95 °F) (Temporal)   Ht 1.6 m (5' 3\")   Wt 89.2 kg (196 lb 11.2 oz)   SpO2 94%   BMI 34.84 kg/m²  Body mass index is 34.84 kg/m².    Gen: Alert and oriented, No apparent distress.  Lungs: Normal effort, CTA bilaterally, no wheezes, rhonchi, or rales  CV: Regular rate and rhythm. No murmurs, rubs, or gallops.  Ext: No clubbing, cyanosis, edema.        Assessment & Plan:     78 y.o. female with the following -     1. Acute bronchitis, unspecified organism  Acute medical condition.  The patient reports a 4-day history of productive cough and subjective fevers.  Home " COVID-19 testing was negative.  The patient denies headaches, sore throat, malaise, and body aches.  -Given the duration of the patient's symptoms we will treat empirically with a Z-[place], Medrol Dosepak, and benzonatate as needed for cough  - azithromycin (ZITHROMAX) 250 MG Tab; Take two tablets on the first day, then one tablet daily for 4 days.  Dispense: 6 Tablet; Refill: 0  - methylPREDNISolone (MEDROL DOSEPAK) 4 MG Tablet Therapy Pack; As directed on the packaging label.  Dispense: 21 Tablet; Refill: 0  - benzonatate (TESSALON) 100 MG Cap; Take 1 Capsule by mouth 3 times a day as needed for Cough.  Dispense: 60 Capsule; Refill: 0    2. Hyperlipidemia associated with type 2 diabetes mellitus (HCC)  Chronic medical condition.  The patient is currently taking metformin 500 mg in the mornings and 1500 mg in the evenings and glipizide 5 mg twice daily.  She is taking rosuvastatin 5 mg nightly for cholesterol.  Labs from 3/9/2023 showed a hemoglobin A1c of 6.9, up from a previous value of 6.1.  Urine microalbumin creatinine ratio was elevated at 159.  Lipid panel showed a total cholesterol 204, LDL 90, HDL 52, triglycerides 310.  -Continue current regimen of metformin 500 mg in the mornings and 1500 mg in the evening, and glipizide 5 mg twice daily, advised reduced carbohydrate consumption as a way to lower blood glucose values as well as triglyceride levels  -Continue rosuvastatin 5 mg nightly  - Lipid Profile; Future  - Comp Metabolic Panel; Future  - HEMOGLOBIN A1C; Future  - MICROALBUMIN CREAT RATIO URINE; Future    3. Anxiety and depression  Chronic condition, not controlled.  The patient's daughter has noticed worsening anxiety in her mother.  She has gone from taking hydroxyzine at night to twice daily.  She is currently taking citalopram 10 mg daily.  Feel patient could benefit from increasing her citalopram as there is a lower risk of falls and sedation than the hydroxyzine.  -Increase citalopram 20 mg daily,  continue hydroxyzine at night  - citalopram (CELEXA) 20 MG Tab; Take 1 Tablet by mouth every day.  Dispense: 90 Tablet; Refill: 3    4. OAB (overactive bladder)  Chronic condition, not controlled.  The patient's daughter is concerned about her increased risk for falls when she gets up to urinate frequently at night.  She previously tried Myrbetriq 25 mg daily but did not find it very effective.  She would like to try increasing the dose to see if she can get better improvement with the higher dose.  -Resume Myrbetriq at higher dose of 50 mg daily  - Mirabegron ER 50 MG TABLET SR 24 HR; Take 50 mg by mouth every day.  Dispense: 30 Tablet; Refill: 3    5. Screening for deficiency anemia  - CBC WITH DIFFERENTIAL; Future      Return in about 6 months (around 10/12/2023) for f/u labs.    Please note that this dictation was created using voice recognition software. I have made every reasonable attempt to correct obvious errors, but I expect that there are errors of grammar and possibly content that I did not discover before finalizing the note.

## 2023-06-01 DIAGNOSIS — M80.00XS AGE-RELATED OSTEOPOROSIS WITH CURRENT PATHOLOGICAL FRACTURE, SEQUELA: ICD-10-CM

## 2023-06-01 RX ORDER — ALENDRONATE SODIUM 70 MG/1
TABLET ORAL
Qty: 12 TABLET | Refills: 3 | Status: SHIPPED | OUTPATIENT
Start: 2023-06-01 | End: 2023-08-19 | Stop reason: SDUPTHER

## 2023-07-05 ENCOUNTER — OFFICE VISIT (OUTPATIENT)
Dept: MEDICAL GROUP | Facility: PHYSICIAN GROUP | Age: 78
End: 2023-07-05
Payer: MEDICARE

## 2023-07-05 VITALS
DIASTOLIC BLOOD PRESSURE: 66 MMHG | HEART RATE: 108 BPM | RESPIRATION RATE: 15 BRPM | OXYGEN SATURATION: 93 % | TEMPERATURE: 96.5 F | WEIGHT: 196.8 LBS | SYSTOLIC BLOOD PRESSURE: 110 MMHG | BODY MASS INDEX: 37.16 KG/M2 | HEIGHT: 61 IN

## 2023-07-05 DIAGNOSIS — E78.5 HYPERLIPIDEMIA ASSOCIATED WITH TYPE 2 DIABETES MELLITUS (HCC): ICD-10-CM

## 2023-07-05 DIAGNOSIS — E11.69 HYPERLIPIDEMIA ASSOCIATED WITH TYPE 2 DIABETES MELLITUS (HCC): ICD-10-CM

## 2023-07-05 DIAGNOSIS — R01.1 SYSTOLIC MURMUR: ICD-10-CM

## 2023-07-05 DIAGNOSIS — I10 PRIMARY HYPERTENSION: ICD-10-CM

## 2023-07-05 PROCEDURE — 99214 OFFICE O/P EST MOD 30 MIN: CPT

## 2023-07-05 PROCEDURE — 3078F DIAST BP <80 MM HG: CPT

## 2023-07-05 PROCEDURE — 3074F SYST BP LT 130 MM HG: CPT

## 2023-07-05 ASSESSMENT — PATIENT HEALTH QUESTIONNAIRE - PHQ9: CLINICAL INTERPRETATION OF PHQ2 SCORE: 0

## 2023-07-05 ASSESSMENT — FIBROSIS 4 INDEX: FIB4 SCORE: 0.43

## 2023-07-05 NOTE — ASSESSMENT & PLAN NOTE
Patient is currently on amlodipine 10mg daily as well metoprolol SR 25mg SR daily. Daughter reports that her diastolic blood pressure has been somewhat elevated in the  range. She does have lasix 20mg once prn, however daughter reports she has not had to use this is many years.     Patient is currently taking metoprolol 25mg in the morning and amlodipine in the evening.     Patient does report that she gets somewhat short of breath.

## 2023-07-05 NOTE — PROGRESS NOTES
"CC:   Chief Complaint   Patient presents with    Blood Pressure Problem     Pts daughter states that her bp has been high the last few weeks. Diastolic has been over 80.   Daughter was concerned.         HISTORY OF PRESENT ILLNESS: Patient is a 78 y.o. female established patient who presents today to discuss the following problems below:     Primary hypertension  Patient is currently on amlodipine 10mg daily as well metoprolol SR 25mg SR daily. Daughter reports that her diastolic blood pressure has been somewhat elevated in the  range. She does have lasix 20mg once prn, however daughter reports she has not had to use this is many years.     Patient is currently taking metoprolol 25mg in the morning and amlodipine in the evening.     Patient does report that she gets somewhat short of breath.       Review of Systems: Otherwise negative except for as stated above.      Exam: /66   Pulse (!) 108   Temp 35.8 °C (96.5 °F) (Temporal)   Resp 15   Ht 1.549 m (5' 1\")   Wt 89.3 kg (196 lb 12.8 oz)   SpO2 93%  Body mass index is 37.19 kg/m².    Physical Exam  Constitutional:       Appearance: Normal appearance.   Eyes:      Extraocular Movements: Extraocular movements intact.   Cardiovascular:      Rate and Rhythm: Tachycardia present.      Heart sounds: Murmur heard.      Comments: 2/6 systolic   Pulmonary:      Effort: Pulmonary effort is normal.   Musculoskeletal:      Cervical back: Normal range of motion.   Feet:      Comments: Monofilament testing with a 10 gram force: sensation intact: decreased bilaterally  Visual Inspection: Feet without maceration, ulcers, fissures.  Pedal pulses: decreased bilaterally    Neurological:      General: No focal deficit present.      Mental Status: She is alert and oriented to person, place, and time.   Psychiatric:         Mood and Affect: Mood normal.         Behavior: Behavior normal.         Assessment/Plan:  78 y.o. female with the following -    1. Primary " hypertension  2. Systolic murmur  Chronic, not at goal, progressive with SOB and new murmur.  Continue amlodipine 10 mg every morning and metoprolol 25mg SR daily with lasix 20mg once prn. Due to new murmur and increasing SOB, would like to obtain echo for further eval. Could consider increasing metoprolol but would not make any significant changes as BP could easily become hypotensive   - EC-ECHOCARDIOGRAM COMPLETE W/O CONT; Future    3. Hyperlipidemia associated with type 2 diabetes mellitus (HCC)  Chronic, stable. Well managed on glipizide 5mg BID and metformin 500mg every morning and 1500mg QHS. Monofilament exam intact   - Diabetic Monofilament LE Exam        Follow-up: Return if symptoms worsen or fail to improve.    Health Maintenance: Completed      Please note that this dictation was created using voice recognition software. I have made every reasonable attempt to correct obvious errors, but I expect that there are errors of grammar and possibly content that I did not discover before finalizing the note.    Electronically signed by LOURDES Nieves on July 5, 2023

## 2023-08-19 DIAGNOSIS — M80.00XS AGE-RELATED OSTEOPOROSIS WITH CURRENT PATHOLOGICAL FRACTURE, SEQUELA: ICD-10-CM

## 2023-08-19 DIAGNOSIS — R73.01 ELEVATED FASTING GLUCOSE: ICD-10-CM

## 2023-08-22 RX ORDER — METFORMIN HYDROCHLORIDE 500 MG/1
TABLET, EXTENDED RELEASE ORAL
Qty: 360 TABLET | Refills: 0 | Status: SHIPPED | OUTPATIENT
Start: 2023-08-22 | End: 2023-11-05

## 2023-08-23 RX ORDER — ALENDRONATE SODIUM 70 MG/1
TABLET ORAL
Qty: 12 TABLET | Refills: 3 | Status: SHIPPED | OUTPATIENT
Start: 2023-08-23 | End: 2023-11-12 | Stop reason: SDUPTHER

## 2023-09-22 ENCOUNTER — APPOINTMENT (OUTPATIENT)
Dept: MEDICAL GROUP | Facility: PHYSICIAN GROUP | Age: 78
End: 2023-09-22
Payer: MEDICARE

## 2023-10-28 NOTE — PROGRESS NOTES
Subjective:     CC: No chief complaint on file.        HPI:   Stacy Kelley is an 78-year-old female who presents for follow-up visit and to discuss the following issues:          Past Medical History:   Diagnosis Date    Anesthesia     PONV; anxious    Arthritis     Cancer (HCC) 3/2012    right breast cancer X 2   (1st time Dx 18 yrs ago , had radation and lumpectomy)    Closed compression fracture of body of L1 vertebra (HCC) 2021    Fall 2021    Heart burn     Hepatitis as child    Hypertension     on metoprolol and amlodipine    Hypokalemia 2021    Hypomagnesemia 2021    Hyponatremia 2021    Hypophosphatemia 2021    Indigestion     on Nexium prn    Leukocytosis 3/2012    Low magnesium level 2021    Low serum phosphorus for age 2022    Low serum potassium level 2021    Malignant neoplasm of unspecified site of right female breast (HCC) 2021    Muscle weakness (generalized) 2021    [place] syndrome 2021    Tachycardia 2022    Unspecified disorder of thyroid     lobectomy, benign nodule    Unspecified urinary incontinence        Social History     Tobacco Use    Smoking status: Former     Current packs/day: 0.00     Average packs/day: 1 pack/day for 20.0 years (20.0 ttl pk-yrs)     Types: Cigarettes     Start date: 1977     Quit date: 1997     Years since quittin.8    Smokeless tobacco: Never   Vaping Use    Vaping Use: Never used   Substance Use Topics    Alcohol use: Yes     Comment: 2 per week    Drug use: No       Current Outpatient Medications Ordered in Epic   Medication Sig Dispense Refill    alendronate (FOSAMAX) 70 MG Tab TAKE ONE TABLET BY MOUTH ONCE A WEEK IN THE MORNING ON AN EMPTY STOMACH WITH 8 OUNCES OF WATER. REMAIN UPRIGHT FOR ONE HOUR AFTER TAKING 12 Tablet 3    metFORMIN ER (GLUCOPHAGE XR) 500 MG TABLET SR 24 HR NEEDS LABS AND FOLLOW UP PRIOR TO ADDITIONAL REFILLS Take 500 mg in the morning and 1500  mg in the evening 360 Tablet 0    glipiZIDE (GLUCOTROL) 5 MG Tab Take 1 Tablet by mouth 2 times a day. 180 Tablet 3    cyclobenzaprine (FLEXERIL) 5 mg tablet TAKE 1 TABLET BY MOUTH THREE TIMES DAILY AS NEEDED FOR MUSCLE SPASMS 90 Tablet 0    citalopram (CELEXA) 20 MG Tab Take 1 Tablet by mouth every day. 90 Tablet 3    Mirabegron ER 50 MG TABLET SR 24 HR Take 50 mg by mouth every day. 30 Tablet 3    azithromycin (ZITHROMAX) 250 MG Tab Take two tablets on the first day, then one tablet daily for 4 days. 6 Tablet 0    methylPREDNISolone (MEDROL DOSEPAK) 4 MG Tablet Therapy Pack As directed on the packaging label. 21 Tablet 0    benzonatate (TESSALON) 100 MG Cap Take 1 Capsule by mouth 3 times a day as needed for Cough. 60 Capsule 0    rosuvastatin (CRESTOR) 5 MG Tab Take 1 Tablet by mouth every evening. 90 Tablet 3    hydrOXYzine HCl (ATARAX) 25 MG Tab TAKE 1 TABLET BY MOUTH TWICE DAILY FOR ANXIETY 180 Tablet 3    amLODIPine (NORVASC) 10 MG Tab TAKE ONE TABLET BY MOUTH AT BEDTIME FOR HYPERTENSION HOLD FOR SYSTOLIC BLOOD PRESSURE LESS THAN 110 90 Tablet 3    metoprolol SR (TOPROL XL) 25 MG TABLET SR 24 HR TAKE 1 TABLET BY MOUTH EVERY DAY 90 Tablet 3    potassium chloride ER (KLOR-CON) 10 MEQ tablet Take 2 Tablets by mouth every day. 180 Tablet 3    meclizine (ANTIVERT) 25 MG Tab Take 1 Tablet by mouth 3 times a day as needed for Nausea/Vomiting. 60 Tablet 3    phosphorus (K-PHOS-NEUTRAL) 250 MG tablet Take 1 Tablet by mouth every 6 hours. 60 Tablet     furosemide (LASIX) 20 MG Tab Take 20 mg by mouth as needed. Indications: Edema       No current Epic-ordered facility-administered medications on file.       Allergies:  Patient has no known allergies.    Health Maintenance: Completed    Review of Systems:  No fevers or chills. No cough, chest pain, or shortness of breath.       Objective:       Exam:  There were no vitals taken for this visit. There is no height or weight on file to calculate BMI.    Gen: Alert and  oriented, No apparent distress.  Lungs: Normal effort, CTA bilaterally, no wheezes, rhonchi, or rales  CV: Regular rate and rhythm. No murmurs, rubs, or gallops.  Ext: No clubbing, cyanosis, edema.        Assessment & Plan:     78 y.o. female with the following -           No follow-ups on file.    Please note that this dictation was created using voice recognition software. I have made every reasonable attempt to correct obvious errors, but I expect that there are errors of grammar and possibly content that I did not discover before finalizing the note.

## 2023-10-31 ENCOUNTER — OFFICE VISIT (OUTPATIENT)
Dept: MEDICAL GROUP | Facility: PHYSICIAN GROUP | Age: 78
End: 2023-10-31
Payer: MEDICARE

## 2023-10-31 VITALS
DIASTOLIC BLOOD PRESSURE: 68 MMHG | WEIGHT: 199 LBS | BODY MASS INDEX: 36.62 KG/M2 | SYSTOLIC BLOOD PRESSURE: 128 MMHG | OXYGEN SATURATION: 96 % | HEART RATE: 101 BPM | TEMPERATURE: 97.2 F | RESPIRATION RATE: 16 BRPM | HEIGHT: 62 IN

## 2023-10-31 DIAGNOSIS — G30.1 MODERATE LATE ONSET ALZHEIMER'S DEMENTIA WITH AGITATION (HCC): ICD-10-CM

## 2023-10-31 DIAGNOSIS — F02.B11 MODERATE LATE ONSET ALZHEIMER'S DEMENTIA WITH AGITATION (HCC): ICD-10-CM

## 2023-10-31 DIAGNOSIS — E78.5 HYPERLIPIDEMIA ASSOCIATED WITH TYPE 2 DIABETES MELLITUS (HCC): ICD-10-CM

## 2023-10-31 DIAGNOSIS — Z13.29 THYROID DISORDER SCREEN: ICD-10-CM

## 2023-10-31 DIAGNOSIS — E11.69 HYPERLIPIDEMIA ASSOCIATED WITH TYPE 2 DIABETES MELLITUS (HCC): ICD-10-CM

## 2023-10-31 DIAGNOSIS — Z13.0 SCREENING FOR DEFICIENCY ANEMIA: ICD-10-CM

## 2023-10-31 DIAGNOSIS — Z00.00 ENCOUNTER FOR MEDICARE ANNUAL WELLNESS EXAM: ICD-10-CM

## 2023-10-31 PROCEDURE — 3074F SYST BP LT 130 MM HG: CPT | Performed by: INTERNAL MEDICINE

## 2023-10-31 PROCEDURE — G0439 PPPS, SUBSEQ VISIT: HCPCS | Performed by: INTERNAL MEDICINE

## 2023-10-31 PROCEDURE — 3078F DIAST BP <80 MM HG: CPT | Performed by: INTERNAL MEDICINE

## 2023-10-31 ASSESSMENT — ACTIVITIES OF DAILY LIVING (ADL): BATHING_REQUIRES_ASSISTANCE: 0

## 2023-10-31 ASSESSMENT — PATIENT HEALTH QUESTIONNAIRE - PHQ9: CLINICAL INTERPRETATION OF PHQ2 SCORE: 0

## 2023-10-31 ASSESSMENT — FIBROSIS 4 INDEX: FIB4 SCORE: 0.43

## 2023-10-31 ASSESSMENT — ENCOUNTER SYMPTOMS: GENERAL WELL-BEING: FAIR

## 2023-10-31 NOTE — PROGRESS NOTES
Chief Complaint   Patient presents with    Annual Exam       HPI:  Stacy Kelley is a 78 y.o. here for Medicare Annual Wellness Visit     Patient Active Problem List    Diagnosis Date Noted    OAB (overactive bladder) 10/25/2022    Thrombocytosis 01/20/2022    Age-related osteoporosis with current pathological fracture 01/20/2022    Hyperlipidemia associated with type 2 diabetes mellitus (HCC) 01/19/2022    Anxiety and depression 01/18/2022    History of breast cancer 01/18/2022    Repeated falls 11/30/2021    Memory loss 11/30/2021    Primary hypertension        Current Outpatient Medications   Medication Sig Dispense Refill    alendronate (FOSAMAX) 70 MG Tab TAKE ONE TABLET BY MOUTH ONCE A WEEK IN THE MORNING ON AN EMPTY STOMACH WITH 8 OUNCES OF WATER. REMAIN UPRIGHT FOR ONE HOUR AFTER TAKING 12 Tablet 3    glipiZIDE (GLUCOTROL) 5 MG Tab Take 1 Tablet by mouth 2 times a day. 180 Tablet 3    citalopram (CELEXA) 20 MG Tab Take 1 Tablet by mouth every day. 90 Tablet 3    Mirabegron ER 50 MG TABLET SR 24 HR Take 50 mg by mouth every day. 30 Tablet 3    rosuvastatin (CRESTOR) 5 MG Tab Take 1 Tablet by mouth every evening. 90 Tablet 3    hydrOXYzine HCl (ATARAX) 25 MG Tab TAKE 1 TABLET BY MOUTH TWICE DAILY FOR ANXIETY 180 Tablet 3    amLODIPine (NORVASC) 10 MG Tab TAKE ONE TABLET BY MOUTH AT BEDTIME FOR HYPERTENSION HOLD FOR SYSTOLIC BLOOD PRESSURE LESS THAN 110 90 Tablet 3    metoprolol SR (TOPROL XL) 25 MG TABLET SR 24 HR TAKE 1 TABLET BY MOUTH EVERY DAY 90 Tablet 3    potassium chloride ER (KLOR-CON) 10 MEQ tablet Take 2 Tablets by mouth every day. 180 Tablet 3    phosphorus (K-PHOS-NEUTRAL) 250 MG tablet Take 1 Tablet by mouth every 6 hours. 60 Tablet     metFORMIN ER (GLUCOPHAGE XR) 500 MG TABLET SR 24 HR TAKE 1 TABLET BY MOUTH EVERY MORNING AND 3 TABLETS BY MOUTH EVERY EVENING. NEEDS LABS AND FOLLOW  Tablet 0     No current facility-administered medications for this visit.          Current  supplements as per medication list.     Allergies: Patient has no known allergies.    Current social contact/activities: Go out to dinner with family, play with dog and go on rides with       She  reports that she quit smoking about 26 years ago. She started smoking about 46 years ago. She has a 20.0 pack-year smoking history. She has never used smokeless tobacco. She reports current alcohol use. She reports that she does not use drugs.  Counseling given: Not Answered      ROS:    Gait: Uses a cane  Ostomy: No  Other tubes: No  Amputations: No  Chronic oxygen use: No  Last eye exam: 2021  Wears hearing aids: Yes   : Reports urinary leakage during the last 6 months that has somewhat interfered with their daily activities or sleep.    Screening:    Depression Screening  Little interest or pleasure in doing things?  0 - not at all  Feeling down, depressed , or hopeless? 0 - not at all  Patient Health Questionnaire Score: 0     If depressive symptoms identified deferred to follow up visit unless specifically addressed in assessment and plan.    Interpretation of PHQ-9 Total Score   Score Severity   1-4 No Depression   5-9 Mild Depression   10-14 Moderate Depression   15-19 Moderately Severe Depression   20-27 Severe Depression    Screening for Cognitive Impairment  Do you or any of your friends or family members have any concern about your memory? Yes  Three Minute Recall (Banana, Sunrise, Chair)  /3 Per daughter patient has dementia   Tr clock face with all 12 numbers and set the hands to show 20 past 8.    Per daughter patient has dementia   Cognitive concerns identified deferred for follow up unless specifically addressed in assessment and plan.    Fall Risk Assessment  Has the patient had two or more falls in the last year or any fall with injury in the last year?  No    Safety Assessment  Do you always wear your seatbelt?  Yes  Any changes to home needed to function safely? No  Difficulty hearing.   Yes  Patient counseled about all safety risks that were identified.    Functional Assessment ADLs  Are there any barriers preventing you from cooking for yourself or meeting nutritional needs?  No.    Are there any barriers preventing you from driving safely or obtaining transportation?  Yes. Per daughter patient has dementia   Are there any barriers preventing you from using a telephone or calling for help?  No    Are there any barriers preventing you from shopping?  No.    Are there any barriers preventing you from taking care of your own finances?  Yes Per daughter patient has dementia   Are there any barriers preventing you from managing your medications?  Yes    Are there any barriers preventing you from showering, bathing or dressing yourself? No    Are there any barriers preventing you from doing housework or laundry? Yes  Are there any barriers preventing you from using the toilet?No  Are you currently engaging in any exercise or physical activity?  Yes. Very minimal states daughter     Self-Assessment of Health  What is your perception of your health? Fair  Do you sleep more than six hours a night? Yes  In the past 7 days, how much did pain keep you from doing your normal work? None  Do you spend quality time with family or friends (virtually or in person)? Yes  Do you usually eat a heart healthy diet that constists of a variety of fruits, vegetables, whole grains and fiber? Yes  Do you eat foods high in fat and/or Fast Food more than three times per week? No    Advance Care Planning  Do you have an Advance Directive, Living Will, Durable Power of , or POLST? Yes  Advance Directive Living Will Durable Power of    is on file      Health Maintenance Summary            Overdue - Bone Density Scan (Every 5 Years) Overdue - never done      No completion history exists for this topic.              Overdue - Zoster (Shingles) Vaccines (1 of 2) Overdue - never done      No completion history exists  for this topic.              Overdue - Hepatitis B Vaccine (Hep B) (1 of 3 - Risk 3-dose series) Overdue - never done      No completion history exists for this topic.              Overdue - Pneumococcal Vaccine: 65+ Years (2 - PPSV23 or PCV20) Overdue since 12/27/2016 11/01/2016  Imm Admin: Pneumococcal Conjugate Vaccine (Prevnar/PCV-13)              Ordered - A1c Screening (Every 6 Months) Ordered on 10/31/2023      03/09/2023  HEMOGLOBIN A1C    01/07/2022  HEMOGLOBIN A1C              Postponed - Diabetes: Retinopathy Screening (Yearly) Postponed until 10/31/2024      No completion history exists for this topic.              COVID-19 Vaccine (6 - Moderna series) Next due on 2/9/2024      10/09/2023  Imm Admin: Comirnaty (Covid-19 Vaccine, Mrna, [id number] Formula)    10/28/2022  Imm Admin: PFIZER BIVALENT SARS-COV-2 VACCINE (12+)    05/15/2022  Imm Admin: MODERNA SARS-COV-2 VACCINE (12+)    11/19/2021  Imm Admin: MODERNA SARS-COV-2 VACCINE (12+)    01/27/2021  Imm Admin: MODERNA SARS-COV-2 VACCINE (12+)    Only the first 5 history entries have been loaded, but more history exists.              Ordered - Fasting Lipid Profile (Yearly) Ordered on 10/31/2023      03/09/2023  Lipid Profile    01/07/2022  Lipid Profile              Ordered - SERUM CREATININE (Yearly) Ordered on 10/31/2023      03/09/2023  Comp Metabolic Panel    01/24/2022  Basic Metabolic Panel    01/07/2022  Comp Metabolic Panel    11/30/2021  Basic Metabolic Panel    11/29/2021  Basic Metabolic Panel    Only the first 5 history entries have been loaded, but more history exists.              Ordered - Diabetes: Urine Protein Screening (Yearly) Ordered on 4/12/2023 03/13/2023  MICROALBUMIN CREAT RATIO URINE              Diabetes: Monofilament / LE Exam (Yearly) Next due on 7/5/2024 07/05/2023  Diabetic Monofilament LE Exam    07/05/2023  [employer]: WORKFLOW - DIABETES - DIABETIC FOOT EXAM PERFORMED              Annual Wellness Visit  (Every 366 Days) Next due on 2024  Subsequent Annual Wellness Visit - Includes PPPS ()    10/31/2023  Visit Dx: Encounter for Medicare annual wellness exam              IMM DTaP/Tdap/Td Vaccine (2 - Td or Tdap) Next due on 2016  Imm Admin: Tdap Vaccine              Influenza Vaccine (Series Information) Completed      2023  Imm Admin: Influenza Vaccine Adult HD    10/21/2022  Imm Admin: Influenza Vaccine Adult HD    10/12/2021  Imm Admin: Influenza Seasonal Injectable - Historical Data    10/12/2021  Imm Admin: Influenza, Unspecified - HISTORICAL DATA    10/01/2020  Imm Admin: Influenza Vaccine Quad Inj (Preserved)    Only the first 5 history entries have been loaded, but more history exists.              Hepatitis A Vaccine (Hep A) (Series Information) Aged Out      No completion history exists for this topic.              HPV Vaccines (Series Information) Aged Out      No completion history exists for this topic.              Polio Vaccine (Inactivated Polio) (Series Information) Aged Out      No completion history exists for this topic.              Meningococcal Immunization (Series Information) Aged Out      No completion history exists for this topic.                    Patient Care Team:  Elvia Ridley M.D. as PCP - General (Internal Medicine)        Social History     Tobacco Use    Smoking status: Former     Current packs/day: 0.00     Average packs/day: 1 pack/day for 20.0 years (20.0 ttl pk-yrs)     Types: Cigarettes     Start date: 1977     Quit date: 1997     Years since quittin.8    Smokeless tobacco: Never   Vaping Use    Vaping Use: Never used   Substance Use Topics    Alcohol use: Yes     Comment: 2 per week    Drug use: No     Family History   Problem Relation Age of Onset    Heart Disease Other     Hypertension Mother     Stroke Mother     Diabetes Father     Diabetes Brother      She  has a past medical history of Anesthesia,  "Arthritis, Cancer (HCC) (3/2012), Closed compression fracture of body of L1 vertebra (HCC) (11/21/2021), Fall (11/23/2021), Heart burn, Hepatitis (as child), Hypertension, Hypokalemia (11/23/2021), Hypomagnesemia (11/23/2021), Hyponatremia (11/21/2021), Hypophosphatemia (11/23/2021), Indigestion, Leukocytosis (3/2012), Low magnesium level (11/23/2021), Low serum phosphorus for age (1/20/2022), Low serum potassium level (11/23/2021), Malignant neoplasm of unspecified site of right female breast (HCC) (11/30/2021), Muscle weakness (generalized) (12/1/2021), 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   Past Surgical History:   Procedure Laterality Date    BUNIONECTOMY Right 9/14/2015    Procedure: BUNIONECTOMY;  Surgeon: Brown Hamomnds M.D.;  Location: SURGERY [facility];  Service:     MASTECTOMY Bilateral 2012    LUMPECTOMY  1997    right breast; excision lymph nodes    THYROID LOBECTOMY  1982    ABDOMINAL HYSTERECTOMY TOTAL  1980    Hysterectomy, Total Abdominal    TUBAL LIGATION  1972    oophorectomy    BREAST RECONSTRUCTION Bilateral     after mastectomy  by Dr Cheema       Exam:   /68   Pulse (!) 101   Temp 36.2 °C (97.2 °F) (Temporal)   Resp 16   Ht 1.575 m (5' 2\")   Wt 90.3 kg (199 lb)   SpO2 96%  Body mass index is 36.4 kg/m².    Hearing good.    Dentition good  Alert, oriented in no acute distress.  Eye contact is good, speech goal directed, affect calm    Assessment and Plan. The following treatment and monitoring plan is recommended:      Encounter for Medicare annual wellness exam  - Subsequent Annual Wellness Visit - Includes PPPS ()    Hyperlipidemia associated with type 2 diabetes mellitus (HCC)  Chronic condition, controlled.  The patient is currently taking metformin 500 mg in the mornings and 1500 mg in the evenings and glipizide 5 mg twice daily.  She is taking rosuvastatin 5 mg nightly for " cholesterol.  Labs from 3/9/2023 showed a hemoglobin A1c of 6.9, up from a previous value of 6.1.  Urine microalbumin creatinine ratio was elevated at 159.  Lipid panel showed a total cholesterol 204, LDL 90, HDL 52, triglycerides 310.  The patient is due for updated labs.  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  The patient lives with her  and in close proximity to her daughter who frequently checks in and cares for her.  She is on citalopram and hydroxyzine for agitation.    Screening for deficiency anemia  - CBC WITH DIFFERENTIAL; Future    Thyroid disorder screen  - TSH WITH REFLEX TO FT4; Future    Services suggested: No services needed at this time  Health Care Screening: Age-appropriate preventive services recommended by USPTF and ACIP covered by Medicare were discussed today. Services ordered if indicated and agreed upon by the patient.  Referrals offered: Community-based lifestyle interventions to reduce health risks and promote self-management and wellness, fall prevention, nutrition, physical activity, tobacco-use cessation, weight loss, and mental health services as per orders if indicated.    Discussion today about general wellness and lifestyle habits:    Prevent falls and reduce trip hazards; Cautioned about securing or removing rugs.  Have a working fire alarm and carbon monoxide detector;   Engage in regular physical activity and social activities     Follow-up: Return in about 6 months (around 4/30/2024) for 6-month f/u visit.    Please note that this dictation was created using voice recognition software. I have made every reasonable attempt to correct obvious errors, but I  expect that there are errors of grammar and possibly content that I did not discover before finalizing the note.

## 2023-11-02 DIAGNOSIS — R73.01 ELEVATED FASTING GLUCOSE: ICD-10-CM

## 2023-11-05 RX ORDER — METFORMIN HYDROCHLORIDE 500 MG/1
TABLET, EXTENDED RELEASE ORAL
Qty: 360 TABLET | Refills: 0 | Status: SHIPPED | OUTPATIENT
Start: 2023-11-05 | End: 2023-11-12 | Stop reason: SDUPTHER

## 2023-11-12 DIAGNOSIS — M80.00XS AGE-RELATED OSTEOPOROSIS WITH CURRENT PATHOLOGICAL FRACTURE, SEQUELA: ICD-10-CM

## 2023-11-12 DIAGNOSIS — R73.01 ELEVATED FASTING GLUCOSE: ICD-10-CM

## 2023-11-13 PROBLEM — G30.1 MILD LATE ONSET ALZHEIMER'S DEMENTIA WITHOUT BEHAVIORAL DISTURBANCE, PSYCHOTIC DISTURBANCE, MOOD DISTURBANCE, OR ANXIETY (HCC): Status: ACTIVE | Noted: 2021-11-30

## 2023-11-13 PROBLEM — F02.A0 MILD LATE ONSET ALZHEIMER'S DEMENTIA WITHOUT BEHAVIORAL DISTURBANCE, PSYCHOTIC DISTURBANCE, MOOD DISTURBANCE, OR ANXIETY (HCC): Status: ACTIVE | Noted: 2021-11-30

## 2023-11-13 RX ORDER — ALENDRONATE SODIUM 70 MG/1
TABLET ORAL
Qty: 12 TABLET | Refills: 3 | Status: SHIPPED | OUTPATIENT
Start: 2023-11-13

## 2023-11-13 RX ORDER — METFORMIN HYDROCHLORIDE 500 MG/1
TABLET, EXTENDED RELEASE ORAL
Qty: 360 TABLET | Refills: 3 | Status: SHIPPED | OUTPATIENT
Start: 2023-11-13 | End: 2024-02-06 | Stop reason: SDUPTHER

## 2023-11-29 ENCOUNTER — PATIENT MESSAGE (OUTPATIENT)
Dept: HEALTH INFORMATION MANAGEMENT | Facility: OTHER | Age: 78
End: 2023-11-29

## 2023-12-06 NOTE — ED NOTES
Assumed care of pt-pt rounding, assisted to position of comfort. Brace from home remains in place, dtr at bedside, aware of pending admit with admit orders noted. Vs as charted, call lith in reach, lunch tray ordered. Cm at bedside for d/c planning   Not applicable

## 2024-01-09 ENCOUNTER — PATIENT MESSAGE (OUTPATIENT)
Dept: MEDICAL GROUP | Facility: PHYSICIAN GROUP | Age: 79
End: 2024-01-09
Payer: MEDICARE

## 2024-01-09 RX ORDER — CYCLOBENZAPRINE HCL 5 MG
TABLET ORAL
Qty: 90 TABLET | Refills: 3 | Status: SHIPPED | OUTPATIENT
Start: 2024-01-09

## 2024-01-10 DIAGNOSIS — I10 ESSENTIAL HYPERTENSION: ICD-10-CM

## 2024-01-12 RX ORDER — METOPROLOL SUCCINATE 25 MG/1
25 TABLET, EXTENDED RELEASE ORAL DAILY
Qty: 90 TABLET | Refills: 3 | Status: SHIPPED | OUTPATIENT
Start: 2024-01-12

## 2024-01-12 RX ORDER — AMLODIPINE BESYLATE 10 MG/1
TABLET ORAL
Qty: 90 TABLET | Refills: 3 | Status: SHIPPED | OUTPATIENT
Start: 2024-01-12

## 2024-02-06 DIAGNOSIS — R73.01 ELEVATED FASTING GLUCOSE: ICD-10-CM

## 2024-02-07 RX ORDER — METFORMIN HYDROCHLORIDE 500 MG/1
TABLET, EXTENDED RELEASE ORAL
Qty: 120 TABLET | Refills: 0 | Status: SHIPPED | OUTPATIENT
Start: 2024-02-07 | End: 2024-03-11 | Stop reason: SDUPTHER

## 2024-02-15 ENCOUNTER — HOSPITAL ENCOUNTER (OUTPATIENT)
Dept: LAB | Facility: MEDICAL CENTER | Age: 79
End: 2024-02-15
Attending: INTERNAL MEDICINE
Payer: MEDICARE

## 2024-02-15 DIAGNOSIS — E11.69 HYPERLIPIDEMIA ASSOCIATED WITH TYPE 2 DIABETES MELLITUS (HCC): ICD-10-CM

## 2024-02-15 DIAGNOSIS — Z13.0 SCREENING FOR DEFICIENCY ANEMIA: ICD-10-CM

## 2024-02-15 DIAGNOSIS — Z13.29 THYROID DISORDER SCREEN: ICD-10-CM

## 2024-02-15 DIAGNOSIS — E78.5 HYPERLIPIDEMIA ASSOCIATED WITH TYPE 2 DIABETES MELLITUS (HCC): ICD-10-CM

## 2024-02-15 LAB
ALBUMIN SERPL BCP-MCNC: 4.1 G/DL (ref 3.2–4.9)
ALBUMIN/GLOB SERPL: 1.5 G/DL
ALP SERPL-CCNC: 78 U/L (ref 30–99)
ALT SERPL-CCNC: 24 U/L (ref 2–50)
ANION GAP SERPL CALC-SCNC: 12 MMOL/L (ref 7–16)
AST SERPL-CCNC: 15 U/L (ref 12–45)
BASOPHILS # BLD AUTO: 0.9 % (ref 0–1.8)
BASOPHILS # BLD: 0.1 K/UL (ref 0–0.12)
BILIRUB SERPL-MCNC: 0.2 MG/DL (ref 0.1–1.5)
BUN SERPL-MCNC: 16 MG/DL (ref 8–22)
CALCIUM ALBUM COR SERPL-MCNC: 9.3 MG/DL (ref 8.5–10.5)
CALCIUM SERPL-MCNC: 9.4 MG/DL (ref 8.5–10.5)
CHLORIDE SERPL-SCNC: 97 MMOL/L (ref 96–112)
CHOLEST SERPL-MCNC: 158 MG/DL (ref 100–199)
CO2 SERPL-SCNC: 23 MMOL/L (ref 20–33)
CREAT SERPL-MCNC: 0.63 MG/DL (ref 0.5–1.4)
EOSINOPHIL # BLD AUTO: 0.26 K/UL (ref 0–0.51)
EOSINOPHIL NFR BLD: 2.3 % (ref 0–6.9)
ERYTHROCYTE [DISTWIDTH] IN BLOOD BY AUTOMATED COUNT: 45.1 FL (ref 35.9–50)
EST. AVERAGE GLUCOSE BLD GHB EST-MCNC: 151 MG/DL
FASTING STATUS PATIENT QL REPORTED: NORMAL
GFR SERPLBLD CREATININE-BSD FMLA CKD-EPI: 89 ML/MIN/1.73 M 2
GLOBULIN SER CALC-MCNC: 2.7 G/DL (ref 1.9–3.5)
GLUCOSE SERPL-MCNC: 316 MG/DL (ref 65–99)
HBA1C MFR BLD: 6.9 % (ref 4–5.6)
HCT VFR BLD AUTO: 43.2 % (ref 37–47)
HDLC SERPL-MCNC: 47 MG/DL
HGB BLD-MCNC: 14.5 G/DL (ref 12–16)
IMM GRANULOCYTES # BLD AUTO: 0.07 K/UL (ref 0–0.11)
IMM GRANULOCYTES NFR BLD AUTO: 0.6 % (ref 0–0.9)
LDLC SERPL CALC-MCNC: 56 MG/DL
LYMPHOCYTES # BLD AUTO: 2.66 K/UL (ref 1–4.8)
LYMPHOCYTES NFR BLD: 24 % (ref 22–41)
MCH RBC QN AUTO: 31.6 PG (ref 27–33)
MCHC RBC AUTO-ENTMCNC: 33.6 G/DL (ref 32.2–35.5)
MCV RBC AUTO: 94.1 FL (ref 81.4–97.8)
MONOCYTES # BLD AUTO: 0.59 K/UL (ref 0–0.85)
MONOCYTES NFR BLD AUTO: 5.3 % (ref 0–13.4)
NEUTROPHILS # BLD AUTO: 7.4 K/UL (ref 1.82–7.42)
NEUTROPHILS NFR BLD: 66.9 % (ref 44–72)
NRBC # BLD AUTO: 0 K/UL
NRBC BLD-RTO: 0 /100 WBC (ref 0–0.2)
PLATELET # BLD AUTO: 527 K/UL (ref 164–446)
PMV BLD AUTO: 9.5 FL (ref 9–12.9)
POTASSIUM SERPL-SCNC: 4.2 MMOL/L (ref 3.6–5.5)
PROT SERPL-MCNC: 6.8 G/DL (ref 6–8.2)
RBC # BLD AUTO: 4.59 M/UL (ref 4.2–5.4)
SODIUM SERPL-SCNC: 132 MMOL/L (ref 135–145)
TRIGL SERPL-MCNC: 273 MG/DL (ref 0–149)
TSH SERPL DL<=0.005 MIU/L-ACNC: 1.71 UIU/ML (ref 0.38–5.33)
WBC # BLD AUTO: 11.1 K/UL (ref 4.8–10.8)

## 2024-02-15 PROCEDURE — 80061 LIPID PANEL: CPT

## 2024-02-15 PROCEDURE — 84443 ASSAY THYROID STIM HORMONE: CPT

## 2024-02-15 PROCEDURE — 83036 HEMOGLOBIN GLYCOSYLATED A1C: CPT | Mod: GA

## 2024-02-15 PROCEDURE — 36415 COLL VENOUS BLD VENIPUNCTURE: CPT | Mod: GA

## 2024-02-15 PROCEDURE — 80053 COMPREHEN METABOLIC PANEL: CPT

## 2024-02-15 PROCEDURE — 85025 COMPLETE CBC W/AUTO DIFF WBC: CPT

## 2024-03-11 DIAGNOSIS — R73.01 ELEVATED FASTING GLUCOSE: ICD-10-CM

## 2024-03-12 RX ORDER — ROSUVASTATIN CALCIUM 5 MG/1
5 TABLET, COATED ORAL EVERY EVENING
Qty: 90 TABLET | Refills: 0 | Status: SHIPPED | OUTPATIENT
Start: 2024-03-12

## 2024-03-12 RX ORDER — METFORMIN HYDROCHLORIDE 500 MG/1
TABLET, EXTENDED RELEASE ORAL
Qty: 120 TABLET | Refills: 0 | Status: SHIPPED | OUTPATIENT
Start: 2024-03-12

## 2024-03-12 NOTE — TELEPHONE ENCOUNTER
Received request via: Patient    Was the patient seen in the last year in this department? Yes    Does the patient have an active prescription (recently filled or refills available) for medication(s) requested? No    [facility]    Does the patient have [facility] Plus and need 100 day supply (blood pressure, diabetes and cholesterol meds only)? Patient does not have SCP

## 2024-04-15 ENCOUNTER — PATIENT MESSAGE (OUTPATIENT)
Dept: MEDICAL GROUP | Facility: PHYSICIAN GROUP | Age: 79
End: 2024-04-15
Payer: MEDICARE

## 2024-04-15 DIAGNOSIS — R73.01 ELEVATED FASTING GLUCOSE: ICD-10-CM

## 2024-04-15 NOTE — TELEPHONE ENCOUNTER
Received request via: Pharmacy    Was the patient seen in the last year in this department? Yes    Does the patient have an active prescription (recently filled or refills available) for medication(s) requested? No    [facility]    Does the patient have assisted Plus and need 100 day supply (blood pressure, diabetes and cholesterol meds only)? Patient does not have SCP

## 2024-04-16 RX ORDER — GLIPIZIDE 5 MG/1
5 TABLET ORAL 2 TIMES DAILY
Qty: 60 TABLET | Refills: 0 | Status: SHIPPED | OUTPATIENT
Start: 2024-04-16

## 2024-04-16 RX ORDER — METFORMIN HYDROCHLORIDE 500 MG/1
TABLET, EXTENDED RELEASE ORAL
Qty: 120 TABLET | Refills: 0 | Status: SHIPPED | OUTPATIENT
Start: 2024-04-16

## 2024-04-28 ENCOUNTER — PATIENT MESSAGE (OUTPATIENT)
Dept: MEDICAL GROUP | Facility: PHYSICIAN GROUP | Age: 79
End: 2024-04-28
Payer: MEDICARE

## 2024-04-28 DIAGNOSIS — F41.9 ANXIETY AND DEPRESSION: ICD-10-CM

## 2024-04-28 DIAGNOSIS — F32.A ANXIETY AND DEPRESSION: ICD-10-CM

## 2024-04-28 DIAGNOSIS — M80.00XS AGE-RELATED OSTEOPOROSIS WITH CURRENT PATHOLOGICAL FRACTURE, SEQUELA: ICD-10-CM

## 2024-04-29 NOTE — PATIENT COMMUNICATION
Received request via: Patient    Was the patient seen in the last year in this department? Yes    Does the patient have an active prescription (recently filled or refills available) for medication(s) requested? No    Pharmacy Name: [facility]    Does the patient have assisted Plus and need 100 day supply (blood pressure, diabetes and cholesterol meds only)? Patient does not have SCP

## 2024-05-02 DIAGNOSIS — F32.A ANXIETY AND DEPRESSION: ICD-10-CM

## 2024-05-02 DIAGNOSIS — F41.9 ANXIETY AND DEPRESSION: ICD-10-CM

## 2024-05-02 NOTE — TELEPHONE ENCOUNTER
Received request via: Patient    Was the patient seen in the last year in this department? Yes    Does the patient have an active prescription (recently filled or refills available) for medication(s) requested? No    [facility] Name: [facility]     Does the patient have nursing home Plus and need 100 day supply (blood pressure, diabetes and cholesterol meds only)? Patient does not have SCP

## 2024-05-02 NOTE — TELEPHONE ENCOUNTER
Received request via: Patient    Was the patient seen in the last year in this department? Yes    Does the patient have an active prescription (recently filled or refills available) for medication(s) requested? No    Pharmacy Name: [id number] - [place], [place] - [address] [facility] [address]        Does the patient have [facility]PC Plus and need 100 day supply (blood pressure, diabetes and cholesterol meds only)? Patient does not have SCP

## 2024-05-04 RX ORDER — CITALOPRAM 20 MG/1
20 TABLET ORAL DAILY
Qty: 90 TABLET | Refills: 3 | Status: SHIPPED | OUTPATIENT
Start: 2024-05-04

## 2024-05-04 RX ORDER — ALENDRONATE SODIUM 70 MG/1
TABLET ORAL
Qty: 12 TABLET | Refills: 3 | Status: SHIPPED | OUTPATIENT
Start: 2024-05-04

## 2024-05-20 DIAGNOSIS — R73.01 ELEVATED FASTING GLUCOSE: ICD-10-CM

## 2024-05-20 NOTE — TELEPHONE ENCOUNTER
Received request via: Patient    Was the patient seen in the last year in this department? Yes    Does the patient have an active prescription (recently filled or refills available) for medication(s) requested? No    Pharmacy Name: [facility] #[id number] - [place], [place] - [address] [facility] [address] [facility] [address]     Does the patient have penitentiary Plus and need 100 day supply (blood pressure, diabetes and cholesterol meds only)? Patient does not have SCP

## 2024-05-21 RX ORDER — METFORMIN HYDROCHLORIDE 500 MG/1
TABLET, EXTENDED RELEASE ORAL
Qty: 120 TABLET | Refills: 0 | Status: SHIPPED | OUTPATIENT
Start: 2024-05-21

## 2024-05-28 NOTE — TELEPHONE ENCOUNTER
Received request via: Patient    Was the patient seen in the last year in this department? Yes    Does the patient have an active prescription (recently filled or refills available) for medication(s) requested? No    Pharmacy Name: [facility] #[id number] - [place], [place] - [address] [facility] [address] [facility] [address]     Does the patient have jail Plus and need 100 day supply (blood pressure, diabetes and cholesterol meds only)? Patient does not have SCP

## 2024-05-29 RX ORDER — GLIPIZIDE 5 MG/1
5 TABLET ORAL 2 TIMES DAILY
Qty: 180 TABLET | Refills: 3 | Status: SHIPPED | OUTPATIENT
Start: 2024-05-29

## 2024-06-08 NOTE — PROGRESS NOTES
Verbal consent was acquired by the patient to use [employer] ambient listening note generation during this visit Yes     Subjective:     CC:   Chief Complaint   Patient presents with    Medication Refill     Wants longer prescription instead of every month          HPI:       History of Present Illness  Stacy Kelley is an 79-year-old female here for follow-up visit.  She is accompanied by her daughter.    The patient's daughter reports that her overall health status is satisfactory.  Review of her most recent labs showed an elevated fasting glucose but the patient states she was not fasting for these labs.  The patient has discontinued the use of Myrbetriq due to its ineffectiveness and does not wish to start a new medication at this time. She reports nocturia, averaging 2 to 3 times per night, which she perceives as manageable.  She would like to review her medications and make sure she has a 3-month supply of her prescriptions.      Past Medical History:   Diagnosis Date    Anesthesia     PONV; anxious    Arthritis     Cancer (HCC) 3/2012    right breast cancer X 2   (1st time Dx 18 yrs ago , had radation and lumpectomy)    Closed compression fracture of body of L1 vertebra (HCC) 11/21/2021    Fall 11/23/2021    Heart burn     Hepatitis as child    Hypertension     on metoprolol and amlodipine    Hypokalemia 11/23/2021    Hypomagnesemia 11/23/2021    Hyponatremia 11/21/2021    Hypophosphatemia 11/23/2021    Indigestion     on Nexium prn    Leukocytosis 3/2012    Low magnesium level 11/23/2021    Low serum phosphorus for age 1/20/2022    Low serum potassium level 11/23/2021    Malignant neoplasm of unspecified site of right female breast (HCC) 11/30/2021    Muscle weakness (generalized) 12/1/2021    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    Tachycardia 1/18/2022    Unspecified disorder of thyroid     lobectomy, benign nodule    Unspecified urinary incontinence        Social History     Tobacco Use    Smoking  "status: Former     Current packs/day: 0.00     Average packs/day: 1 pack/day for 20.0 years (20.0 ttl pk-yrs)     Types: Cigarettes     Start date: 1977     Quit date: 1997     Years since quittin.4    Smokeless tobacco: Never   Vaping Use    Vaping status: Never Used   Substance Use Topics    Alcohol use: Yes     Comment: 2 per week    Drug use: No       Current Outpatient Medications Ordered in Epic   Medication Sig Dispense Refill    metFORMIN ER (GLUCOPHAGE XR) 500 MG TABLET SR 24 HR TAKE 1 TABLET BY MOUTH EVERY MORNING AND 3 TABLETS BY MOUTH EVERY EVENING. 360 Tablet 3    rosuvastatin (CRESTOR) 5 MG Tab TAKE 1 TABLET BY MOUTH EVERY EVENING 90 Tablet 3    hydrOXYzine HCl (ATARAX) 25 MG Tab TAKE 1 TABLET BY MOUTH TWICE DAILY FOR ANXIETY 180 Tablet 3    glipiZIDE (GLUCOTROL) 5 MG Tab Take 1 Tablet by mouth 2 times a day. 180 Tablet 3    alendronate (FOSAMAX) 70 MG Tab TAKE ONE TABLET BY MOUTH ONCE A WEEK IN THE MORNING ON AN EMPTY STOMACH WITH 8 OUNCES OF WATER. REMAIN UPRIGHT FOR ONE HOUR AFTER TAKING 12 Tablet 3    citalopram (CELEXA) 20 MG Tab Take 1 Tablet by mouth every day. 90 Tablet 3    amLODIPine (NORVASC) 10 MG Tab TAKE ONE TABLET BY MOUTH AT BEDTIME FOR HYPERTENSION HOLD FOR SYSTOLIC BLOOD PRESSURE LESS THAN 110 90 Tablet 3    metoprolol SR (TOPROL XL) 25 MG TABLET SR 24 HR Take 1 Tablet by mouth every day. 90 Tablet 3    cyclobenzaprine (FLEXERIL) 5 mg tablet TAKE 1 TABLET BY MOUTH THREE TIMES DAILY AS NEEDED FOR MUSCLE SPASMS 90 Tablet 3     No current Epic-ordered facility-administered medications on file.       Allergies:  Patient has no known allergies.    Health Maintenance: Completed    Review of Systems:  No fevers or chills. No cough, chest pain, or shortness of breath.       Objective:       Exam:  /68   Pulse (!) 107   Temp 36.4 °C (97.5 °F) (Temporal)   Ht 1.575 m (5' 2\")   Wt 89.8 kg (198 lb)   SpO2 94%   BMI 36.21 kg/m²  Body mass index is 36.21 " kg/m².    Gen: Alert and oriented, No apparent distress.  Lungs: Normal effort, CTA bilaterally, no wheezes, rhonchi, or rales  CV: Regular rate and rhythm. No murmurs, rubs, or gallops.  Ext: No clubbing, cyanosis, edema.        Assessment & Plan:     79 y.o. female with the following -     Thrombocytosis   Chronic condition, stable.  Labs from 2/15/2024 showed a stable elevated platelet count of 527.  The patient is also noted to have a persistently elevated WBC count with normal differential.  Ferritin and iron testing in 2023 was normal.  Will check genetic mutations and inflammatory markers involved in essential thrombocytosis.  - JAK2 GENE MUT RFLX CALR RFLX MPL; Future  - Sed Rate; Future  - CRP QUANTITIVE (NON-CARDIAC); Future    Primary hypertension  Chronic condition, controlled.  The patient currently takes metoprolol 25 mg SR daily and amlodipine 10 mg daily.  Blood pressure today's visit is 126/68.    -Continue current regimen of metoprolol 25 mg SR daily and amlodipine 10 mg daily     Hyperlipidemia associated with type 2 diabetes mellitus (HCC)  Chronic condition, controlled.  The patient is currently taking metformin 500 mg in the mornings and 1500 mg in the evenings and glipizide 5 mg twice daily.  She is taking rosuvastatin 5 mg nightly for cholesterol.  Labs from 2/15/2024 showed a hemoglobin A1c of 6.9, total cholesterol 158, LDL 56, HDL 47, triglycerides 273.  -Continue current regimen of metformin 500 mg in the mornings and 1500 mg in the evening, glipizide 5 mg twice daily  - metFORMIN ER (GLUCOPHAGE XR) 500 MG TABLET SR 24 HR; TAKE 1 TABLET BY MOUTH EVERY MORNING AND 3 TABLETS BY MOUTH EVERY EVENING.  Dispense: 360 Tablet; Refill: 3     Age-related osteoporosis with current pathological fracture, sequela  Chronic condition, ongoing.  The patient had a bone density study in 2021 showing osteoporosis (results not available).  She was subsequently started on alendronate 70 mg weekly.     -Continue current regimen of alendronate 70 mg weekly, repeat bone density due 11/2023  - DS-BONE DENSITY STUDY (DEXA); Future      Return in about 6 months (around 12/12/2024) for 6-month f/u visit.    Please note that this dictation was created using voice recognition software. I have made every reasonable attempt to correct obvious errors, but I expect that there are errors of grammar and possibly content that I did not discover before finalizing the note.

## 2024-06-10 RX ORDER — HYDROXYZINE HYDROCHLORIDE 25 MG/1
TABLET, FILM COATED ORAL
Qty: 180 TABLET | Refills: 3 | Status: SHIPPED | OUTPATIENT
Start: 2024-06-10

## 2024-06-10 RX ORDER — ROSUVASTATIN CALCIUM 5 MG/1
5 TABLET, COATED ORAL EVERY EVENING
Qty: 90 TABLET | Refills: 3 | Status: SHIPPED | OUTPATIENT
Start: 2024-06-10

## 2024-06-10 NOTE — TELEPHONE ENCOUNTER
Received request via: Pharmacy    Was the patient seen in the last year in this department? Yes    Does the patient have an active prescription (recently filled or refills available) for medication(s) requested? No    Pharmacy Name: [id number] - [place], [place] - [address] AT [facility] [address]     Does the patient have [facility] Plus and need 100 day supply (blood pressure, diabetes and cholesterol meds only)? Patient does not have SCP

## 2024-06-10 NOTE — TELEPHONE ENCOUNTER
Received request via: Pharmacy    Was the patient seen in the last year in this department? Yes    Does the patient have an active prescription (recently filled or refills available) for medication(s) requested? No    Pharmacy Name: [facility]     Does the patient have retirement Plus and need 100 day supply (blood pressure, diabetes and cholesterol meds only)? Patient does not have SCP

## 2024-06-12 ENCOUNTER — APPOINTMENT (OUTPATIENT)
Dept: MEDICAL GROUP | Facility: PHYSICIAN GROUP | Age: 79
End: 2024-06-12
Payer: MEDICARE

## 2024-06-12 VITALS
HEIGHT: 62 IN | HEART RATE: 107 BPM | OXYGEN SATURATION: 94 % | SYSTOLIC BLOOD PRESSURE: 126 MMHG | TEMPERATURE: 97.5 F | DIASTOLIC BLOOD PRESSURE: 68 MMHG | WEIGHT: 198 LBS | BODY MASS INDEX: 36.44 KG/M2

## 2024-06-12 DIAGNOSIS — D75.839 THROMBOCYTOSIS: ICD-10-CM

## 2024-06-12 DIAGNOSIS — I10 PRIMARY HYPERTENSION: ICD-10-CM

## 2024-06-12 DIAGNOSIS — E11.69 HYPERLIPIDEMIA ASSOCIATED WITH TYPE 2 DIABETES MELLITUS (HCC): ICD-10-CM

## 2024-06-12 DIAGNOSIS — E78.5 HYPERLIPIDEMIA ASSOCIATED WITH TYPE 2 DIABETES MELLITUS (HCC): ICD-10-CM

## 2024-06-12 DIAGNOSIS — M80.00XS AGE-RELATED OSTEOPOROSIS WITH CURRENT PATHOLOGICAL FRACTURE, SEQUELA: ICD-10-CM

## 2024-06-12 PROBLEM — M17.0 OSTEOARTHRITIS OF BOTH KNEES: Status: ACTIVE | Noted: 2022-10-10

## 2024-06-12 PROCEDURE — 99214 OFFICE O/P EST MOD 30 MIN: CPT | Performed by: INTERNAL MEDICINE

## 2024-06-12 PROCEDURE — 3078F DIAST BP <80 MM HG: CPT | Performed by: INTERNAL MEDICINE

## 2024-06-12 PROCEDURE — 3074F SYST BP LT 130 MM HG: CPT | Performed by: INTERNAL MEDICINE

## 2024-06-12 RX ORDER — METFORMIN HYDROCHLORIDE 500 MG/1
TABLET, EXTENDED RELEASE ORAL
Qty: 360 TABLET | Refills: 3 | Status: SHIPPED | OUTPATIENT
Start: 2024-06-12

## 2024-06-12 RX ORDER — METAXALONE 800 MG/1
1 TABLET ORAL 3 TIMES DAILY
COMMUNITY
End: 2024-06-12

## 2024-06-12 ASSESSMENT — FIBROSIS 4 INDEX: FIB4 SCORE: 0.47

## 2024-07-07 DIAGNOSIS — M80.00XS AGE-RELATED OSTEOPOROSIS WITH CURRENT PATHOLOGICAL FRACTURE, SEQUELA: ICD-10-CM

## 2024-07-08 RX ORDER — ALENDRONATE SODIUM 70 MG/1
TABLET ORAL
Qty: 12 TABLET | Refills: 3 | Status: SHIPPED | OUTPATIENT
Start: 2024-07-08

## 2024-09-09 NOTE — TELEPHONE ENCOUNTER
Received request via: Patient    Was the patient seen in the last year in this department? Yes    Does the patient have an active prescription (recently filled or refills available) for medication(s) requested? No    Does the patient have long-term Plus and need 100 day supply (blood pressure, diabetes and cholesterol meds only)? Patient does not have SCP   Rx sent

## 2024-11-04 SDOH — HEALTH STABILITY: PHYSICAL HEALTH: ON AVERAGE, HOW MANY MINUTES DO YOU ENGAGE IN EXERCISE AT THIS LEVEL?: 30 MIN

## 2024-11-04 SDOH — ECONOMIC STABILITY: TRANSPORTATION INSECURITY
IN THE PAST 12 MONTHS, HAS THE LACK OF TRANSPORTATION KEPT YOU FROM MEDICAL APPOINTMENTS OR FROM GETTING MEDICATIONS?: NO

## 2024-11-04 SDOH — ECONOMIC STABILITY: INCOME INSECURITY: HOW HARD IS IT FOR YOU TO PAY FOR THE VERY BASICS LIKE FOOD, HOUSING, MEDICAL CARE, AND HEATING?: NOT VERY HARD

## 2024-11-04 SDOH — ECONOMIC STABILITY: TRANSPORTATION INSECURITY
IN THE PAST 12 MONTHS, HAS LACK OF TRANSPORTATION KEPT YOU FROM MEETINGS, WORK, OR FROM GETTING THINGS NEEDED FOR DAILY LIVING?: NO

## 2024-11-04 SDOH — HEALTH STABILITY: MENTAL HEALTH
STRESS IS WHEN SOMEONE FEELS TENSE, NERVOUS, ANXIOUS, OR CAN'T SLEEP AT NIGHT BECAUSE THEIR MIND IS TROUBLED. HOW STRESSED ARE YOU?: TO SOME EXTENT

## 2024-11-04 SDOH — ECONOMIC STABILITY: FOOD INSECURITY: WITHIN THE PAST 12 MONTHS, YOU WORRIED THAT YOUR FOOD WOULD RUN OUT BEFORE YOU GOT MONEY TO BUY MORE.: NEVER TRUE

## 2024-11-04 SDOH — ECONOMIC STABILITY: FOOD INSECURITY: WITHIN THE PAST 12 MONTHS, THE FOOD YOU BOUGHT JUST DIDN'T LAST AND YOU DIDN'T HAVE MONEY TO GET MORE.: NEVER TRUE

## 2024-11-04 SDOH — ECONOMIC STABILITY: HOUSING INSECURITY
IN THE LAST 12 MONTHS, WAS THERE A TIME WHEN YOU DID NOT HAVE A STEADY PLACE TO SLEEP OR SLEPT IN A SHELTER (INCLUDING NOW)?: NO

## 2024-11-04 SDOH — ECONOMIC STABILITY: INCOME INSECURITY: IN THE LAST 12 MONTHS, WAS THERE A TIME WHEN YOU WERE NOT ABLE TO PAY THE MORTGAGE OR RENT ON TIME?: NO

## 2024-11-04 SDOH — HEALTH STABILITY: PHYSICAL HEALTH: ON AVERAGE, HOW MANY DAYS PER WEEK DO YOU ENGAGE IN MODERATE TO STRENUOUS EXERCISE (LIKE A BRISK WALK)?: 1 DAY

## 2024-11-04 SDOH — ECONOMIC STABILITY: TRANSPORTATION INSECURITY
IN THE PAST 12 MONTHS, HAS LACK OF RELIABLE TRANSPORTATION KEPT YOU FROM MEDICAL APPOINTMENTS, MEETINGS, WORK OR FROM GETTING THINGS NEEDED FOR DAILY LIVING?: NO

## 2024-11-04 ASSESSMENT — SOCIAL DETERMINANTS OF HEALTH (SDOH)
HOW OFTEN DO YOU GET TOGETHER WITH FRIENDS OR RELATIVES?: ONCE A WEEK
IN A TYPICAL WEEK, HOW MANY TIMES DO YOU TALK ON THE PHONE WITH FAMILY, FRIENDS, OR NEIGHBORS?: MORE THAN THREE TIMES A WEEK
HOW OFTEN DO YOU HAVE A DRINK CONTAINING ALCOHOL: NEVER
HOW OFTEN DO YOU ATTEND CHURCH OR RELIGIOUS SERVICES?: NEVER
HOW OFTEN DO YOU ATTENT MEETINGS OF THE CLUB OR ORGANIZATION YOU BELONG TO?: NEVER
HOW OFTEN DO YOU ATTEND CHURCH OR RELIGIOUS SERVICES?: NEVER
IN THE PAST 12 MONTHS, HAS THE ELECTRIC, GAS, OIL, OR WATER COMPANY THREATENED TO SHUT OFF SERVICE IN YOUR HOME?: NO
HOW MANY DRINKS CONTAINING ALCOHOL DO YOU HAVE ON A TYPICAL DAY WHEN YOU ARE DRINKING: PATIENT DOES NOT DRINK
IN A TYPICAL WEEK, HOW MANY TIMES DO YOU TALK ON THE PHONE WITH FAMILY, FRIENDS, OR NEIGHBORS?: MORE THAN THREE TIMES A WEEK
HOW OFTEN DO YOU HAVE SIX OR MORE DRINKS ON ONE OCCASION: NEVER
HOW OFTEN DO YOU ATTENT MEETINGS OF THE CLUB OR ORGANIZATION YOU BELONG TO?: NEVER
DO YOU BELONG TO ANY CLUBS OR ORGANIZATIONS SUCH AS CHURCH GROUPS UNIONS, FRATERNAL OR ATHLETIC GROUPS, OR SCHOOL GROUPS?: NO
HOW HARD IS IT FOR YOU TO PAY FOR THE VERY BASICS LIKE FOOD, HOUSING, MEDICAL CARE, AND HEATING?: NOT VERY HARD
DO YOU BELONG TO ANY CLUBS OR ORGANIZATIONS SUCH AS CHURCH GROUPS UNIONS, FRATERNAL OR ATHLETIC GROUPS, OR SCHOOL GROUPS?: NO
HOW OFTEN DO YOU GET TOGETHER WITH FRIENDS OR RELATIVES?: ONCE A WEEK
WITHIN THE PAST 12 MONTHS, YOU WORRIED THAT YOUR FOOD WOULD RUN OUT BEFORE YOU GOT THE MONEY TO BUY MORE: NEVER TRUE

## 2024-11-04 ASSESSMENT — LIFESTYLE VARIABLES
HOW OFTEN DO YOU HAVE A DRINK CONTAINING ALCOHOL: NEVER
AUDIT-C TOTAL SCORE: 0
HOW MANY STANDARD DRINKS CONTAINING ALCOHOL DO YOU HAVE ON A TYPICAL DAY: PATIENT DOES NOT DRINK
HOW OFTEN DO YOU HAVE SIX OR MORE DRINKS ON ONE OCCASION: NEVER
SKIP TO QUESTIONS 9-10: 1

## 2024-11-07 ENCOUNTER — OFFICE VISIT (OUTPATIENT)
Dept: MEDICAL GROUP | Facility: PHYSICIAN GROUP | Age: 79
End: 2024-11-07
Payer: MEDICARE

## 2024-11-07 ENCOUNTER — HOSPITAL ENCOUNTER (OUTPATIENT)
Facility: MEDICAL CENTER | Age: 79
End: 2024-11-07
Attending: FAMILY MEDICINE
Payer: MEDICARE

## 2024-11-07 VITALS
BODY MASS INDEX: 36.25 KG/M2 | OXYGEN SATURATION: 95 % | HEART RATE: 91 BPM | SYSTOLIC BLOOD PRESSURE: 116 MMHG | TEMPERATURE: 97.7 F | HEIGHT: 62 IN | WEIGHT: 197 LBS | DIASTOLIC BLOOD PRESSURE: 68 MMHG

## 2024-11-07 DIAGNOSIS — Z23 NEED FOR VACCINATION: ICD-10-CM

## 2024-11-07 DIAGNOSIS — E11.22 TYPE 2 DIABETES MELLITUS WITH STAGE 2 CHRONIC KIDNEY DISEASE, WITHOUT LONG-TERM CURRENT USE OF INSULIN (HCC): ICD-10-CM

## 2024-11-07 DIAGNOSIS — N18.2 TYPE 2 DIABETES MELLITUS WITH STAGE 2 CHRONIC KIDNEY DISEASE, WITHOUT LONG-TERM CURRENT USE OF INSULIN (HCC): ICD-10-CM

## 2024-11-07 DIAGNOSIS — E78.5 HYPERLIPIDEMIA ASSOCIATED WITH TYPE 2 DIABETES MELLITUS (HCC): ICD-10-CM

## 2024-11-07 DIAGNOSIS — I12.9 HYPERTENSION ASSOCIATED WITH CHRONIC KIDNEY DISEASE DUE TO TYPE 2 DIABETES MELLITUS (HCC): ICD-10-CM

## 2024-11-07 DIAGNOSIS — E11.22 HYPERTENSION ASSOCIATED WITH CHRONIC KIDNEY DISEASE DUE TO TYPE 2 DIABETES MELLITUS (HCC): ICD-10-CM

## 2024-11-07 DIAGNOSIS — M17.0 PRIMARY OSTEOARTHRITIS OF BOTH KNEES: ICD-10-CM

## 2024-11-07 DIAGNOSIS — F02.A3 MILD LATE ONSET ALZHEIMER'S DEMENTIA WITH MOOD DISTURBANCE (HCC): ICD-10-CM

## 2024-11-07 DIAGNOSIS — G30.1 MILD LATE ONSET ALZHEIMER'S DEMENTIA WITH MOOD DISTURBANCE (HCC): ICD-10-CM

## 2024-11-07 DIAGNOSIS — E11.69 HYPERLIPIDEMIA ASSOCIATED WITH TYPE 2 DIABETES MELLITUS (HCC): ICD-10-CM

## 2024-11-07 DIAGNOSIS — D75.839 THROMBOCYTOSIS: ICD-10-CM

## 2024-11-07 PROBLEM — Z99.89 DEPENDENCE ON CANE: Status: ACTIVE | Noted: 2024-11-07

## 2024-11-07 LAB
HBA1C MFR BLD: 7.9 % (ref ?–5.8)
POCT INT CON NEG: NEGATIVE
POCT INT CON POS: POSITIVE

## 2024-11-07 PROCEDURE — 99215 OFFICE O/P EST HI 40 MIN: CPT | Mod: 25 | Performed by: FAMILY MEDICINE

## 2024-11-07 PROCEDURE — 3074F SYST BP LT 130 MM HG: CPT | Performed by: FAMILY MEDICINE

## 2024-11-07 PROCEDURE — 3078F DIAST BP <80 MM HG: CPT | Performed by: FAMILY MEDICINE

## 2024-11-07 PROCEDURE — 82043 UR ALBUMIN QUANTITATIVE: CPT

## 2024-11-07 PROCEDURE — G0009 ADMIN PNEUMOCOCCAL VACCINE: HCPCS | Performed by: FAMILY MEDICINE

## 2024-11-07 PROCEDURE — 83036 HEMOGLOBIN GLYCOSYLATED A1C: CPT | Performed by: FAMILY MEDICINE

## 2024-11-07 PROCEDURE — 82570 ASSAY OF URINE CREATININE: CPT

## 2024-11-07 PROCEDURE — 92250 FUNDUS PHOTOGRAPHY W/I&R: CPT | Mod: 26 | Performed by: FAMILY MEDICINE

## 2024-11-07 PROCEDURE — 90677 PCV20 VACCINE IM: CPT | Performed by: FAMILY MEDICINE

## 2024-11-07 ASSESSMENT — PATIENT HEALTH QUESTIONNAIRE - PHQ9: CLINICAL INTERPRETATION OF PHQ2 SCORE: 0

## 2024-11-07 ASSESSMENT — FIBROSIS 4 INDEX: FIB4 SCORE: 0.47

## 2024-11-07 NOTE — PROGRESS NOTES
Verbal consent was acquired by the patient to use Done In :60 Second[employer] ambient listening note generation during this visit     Subjective:     HPI:   History of Present Illness  The patient is here to establish care and follow up on a couple of her chronic conditions. She is accompanied by her daughter.    She is managing her diabetes. Her instantaneous glucose was high back in 02/2024, but she does not do any home monitoring. She is on metformin and glipizide. There have been no major changes, such as neuropathy or weakness.    She has intermittent aches and pains related to her degenerative knee disease, which are responsive to Tylenol. She receives intermittent cortisone steroid injections from outpatient orthopedics and attends weekly physical therapy, both of which help. She takes hydroxyzine once at night for restlessness, which helps without side effects. Additionally, she takes a muscle relaxer, possibly meloxicam, which also helps.    She takes citalopram for depression and anxiety. She reports not experiencing low mood or anhedonia. She is mostly restricted by her dementia in relation to knee pain. She does not feel down, depressed, or hopeless.    She has some concerns with cooking but no safety concerns. She stays in the house and is dependent on her  and daughter for most activities of daily living (ADLs) and instrumental activities of daily living (IADLs). A clock drawing test showed she remembered 3 words initially, but only 1 of 3 words after 5 minutes, and the clock numbers were incorrect.    She has not been in [place] very long and was previously with Dr. Ridley for a couple of months. She needs refills on all her medications. She has not received the pneumonia vaccine.    She is doing fine with physical therapy for her knees, which helps. She fell about 5 years ago and has a walker that she keeps by the bed, although she is working on using it more at home. She sometimes goes barefoot at home and is  "trying to get pedicures, thinking it would stimulate her feet.    Overall, she is relatively stable and doing well. There are no other major concerns at this time.    IMMUNIZATIONS  She had pneumonia 13 vaccine in 2016. She had influenza vaccine.        Objective:     Exam:  /68 (BP Location: Left arm, Patient Position: Sitting, BP Cuff Size: Adult)   Pulse 91   Temp 36.5 °C (97.7 °F) (Temporal)   Ht 1.575 m (5' 2\")   Wt 89.4 kg (197 lb)   SpO2 95%   BMI 36.03 kg/m²  Body mass index is 36.03 kg/m².    Physical Exam  Constitutional:       Appearance: Normal appearance.   HENT:      Head: Normocephalic and atraumatic.      Nose: Nose normal.      Mouth/Throat:      Mouth: Mucous membranes are moist.   Eyes:      Extraocular Movements: Extraocular movements intact.      Conjunctiva/sclera: Conjunctivae normal.      Pupils: Pupils are equal, round, and reactive to light.   Cardiovascular:      Rate and Rhythm: Normal rate and regular rhythm.      Heart sounds: No murmur heard.  Pulmonary:      Effort: Pulmonary effort is normal.      Breath sounds: Normal breath sounds.   Abdominal:      General: Abdomen is flat. Bowel sounds are normal.      Palpations: Abdomen is soft.   Skin:     General: Skin is warm and dry.   Neurological:      General: No focal deficit present.      Mental Status: She is alert and oriented to person, place, and time. Mental status is at baseline.   Psychiatric:         Mood and Affect: Mood normal.         Results  A1c 7.9 (6.9)    Assessment & Plan:     1. Thrombocytosis        2. Type 2 diabetes mellitus with stage 2 chronic kidney disease, without long-term current use of insulin (HCC)  POCT Hemoglobin A1C    POCT Retinal Eye Exam    Diabetic Monofilament LE Exam    MICROALBUMIN CREAT RATIO URINE    CANCELED: MICROALBUMIN CREAT RATIO URINE      3. 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 Hyperlipidemia associated with type 2 diabetes mellitus (HCC) "        5. Hypertension associated with chronic kidney disease due to type 2 diabetes mellitus (HCC)        6. Primary osteoarthritis of both knees        7. Need for vaccination  Pneumococcal Conjugate Vaccine 20-Valent          Assessment & Plan  Thrombocytosis.  Chronic chronic.  Stable.  Patient and her daughter have decided not to get lab work ordered by prior PCP done looking into the etiology of this as patient does not seem to be having any problems at this time.    2.  Diabetes  Chronic.  Stable and controlled with an A1c less than 8.0 which is appropriate given her age.  All screenings are up-to-date as of today.  Patient is to continue metformin 500 mg daily as well as glipizide 5 mg twice a day.    3.HTN  Chronic, controlled.  Blood pressure is at goal under 140/90 in the office today.  We will continue the current regimen of amlodipine 10 mg daily and metoprolol 25 mg daily    4. DLD  Chronic, controlled. She is on a statin for primary prevention and tolerating it well. The  last cholesterol panel in 2/2024 was all within normal limits so we will continue the current dosing of rosuvastatin 5 mg nightly    5. Dementia  Chronic. Stable and not currently progressing. Patient with adequate support at this time.  Patient is to continue citalopram at 20 mg daily and hydroxyzine 25 mg as needed.    6.  Osteoarthritis of knees.  Chronic.  Patient is to continue to follow-up with Ortho and physical therapy as instructed.  Patient is to continue to take Flexeril 5 mg as needed.        I spent a total of 46 minutes with record review, exam, communication with the patient, communication with other providers, and documentation of this encounter.    Return in about 6 months (around 5/7/2025) for DMT2-Get A1c.    Please note that this dictation was created using voice recognition software. I have made every reasonable attempt to correct obvious errors, but I expect that there are errors of grammar and possibly content  that I did not discover before finalizing the note.

## 2024-11-08 ENCOUNTER — PATIENT MESSAGE (OUTPATIENT)
Dept: MEDICAL GROUP | Facility: PHYSICIAN GROUP | Age: 79
End: 2024-11-08
Payer: MEDICARE

## 2024-11-08 DIAGNOSIS — E11.69 HYPERLIPIDEMIA ASSOCIATED WITH TYPE 2 DIABETES MELLITUS (HCC): ICD-10-CM

## 2024-11-08 DIAGNOSIS — E11.22 HYPERTENSION ASSOCIATED WITH CHRONIC KIDNEY DISEASE DUE TO TYPE 2 DIABETES MELLITUS (HCC): ICD-10-CM

## 2024-11-08 DIAGNOSIS — N18.2 TYPE 2 DIABETES MELLITUS WITH STAGE 2 CHRONIC KIDNEY DISEASE, WITHOUT LONG-TERM CURRENT USE OF INSULIN (HCC): ICD-10-CM

## 2024-11-08 DIAGNOSIS — E78.5 HYPERLIPIDEMIA ASSOCIATED WITH TYPE 2 DIABETES MELLITUS (HCC): ICD-10-CM

## 2024-11-08 DIAGNOSIS — I12.9 HYPERTENSION ASSOCIATED WITH CHRONIC KIDNEY DISEASE DUE TO TYPE 2 DIABETES MELLITUS (HCC): ICD-10-CM

## 2024-11-08 DIAGNOSIS — E11.22 TYPE 2 DIABETES MELLITUS WITH STAGE 2 CHRONIC KIDNEY DISEASE, WITHOUT LONG-TERM CURRENT USE OF INSULIN (HCC): ICD-10-CM

## 2024-11-08 LAB
CREAT UR-MCNC: 84.27 MG/DL
MICROALBUMIN UR-MCNC: 30.2 MG/DL
MICROALBUMIN/CREAT UR: 358 MG/G (ref 0–30)

## 2024-11-12 LAB — RETINAL SCREEN: NEGATIVE

## 2024-11-12 RX ORDER — DAPAGLIFLOZIN 5 MG/1
5 TABLET, FILM COATED ORAL DAILY
Qty: 90 TABLET | Refills: 3 | Status: SHIPPED | OUTPATIENT
Start: 2024-11-12

## 2024-12-31 ENCOUNTER — PATIENT MESSAGE (OUTPATIENT)
Dept: MEDICAL GROUP | Facility: PHYSICIAN GROUP | Age: 79
End: 2024-12-31
Payer: MEDICARE

## 2024-12-31 DIAGNOSIS — I10 PRIMARY HYPERTENSION: ICD-10-CM

## 2024-12-31 DIAGNOSIS — M17.0 PRIMARY OSTEOARTHRITIS OF BOTH KNEES: ICD-10-CM

## 2025-01-02 RX ORDER — METOPROLOL SUCCINATE 25 MG/1
25 TABLET, EXTENDED RELEASE ORAL DAILY
Qty: 90 TABLET | Refills: 3 | Status: SHIPPED | OUTPATIENT
Start: 2025-01-02

## 2025-01-02 RX ORDER — CYCLOBENZAPRINE HCL 5 MG
TABLET ORAL
Qty: 90 TABLET | Refills: 3 | Status: SHIPPED | OUTPATIENT
Start: 2025-01-02

## 2025-01-02 NOTE — PATIENT COMMUNICATION
Received request via: Patient    Was the patient seen in the last year in this department? Yes    Does the patient have an active prescription (recently filled or refills available) for medication(s) requested? No    Pharmacy Name:   [id number] - [place], [place] - [address] AT [facility] [phone]       Does the patient have half-way Plus and need 100-day supply? (This applies to ALL medications) Patient does not have SCP

## 2025-01-06 DIAGNOSIS — I10 ESSENTIAL HYPERTENSION: ICD-10-CM

## 2025-01-06 RX ORDER — AMLODIPINE BESYLATE 10 MG/1
TABLET ORAL
Qty: 90 TABLET | Refills: 1 | Status: SHIPPED | OUTPATIENT
Start: 2025-01-06

## 2025-05-23 ENCOUNTER — PATIENT MESSAGE (OUTPATIENT)
Dept: MEDICAL GROUP | Facility: PHYSICIAN GROUP | Age: 80
End: 2025-05-23
Payer: MEDICARE

## 2025-05-23 DIAGNOSIS — E11.69 HYPERLIPIDEMIA ASSOCIATED WITH TYPE 2 DIABETES MELLITUS (HCC): ICD-10-CM

## 2025-05-23 DIAGNOSIS — E78.5 HYPERLIPIDEMIA ASSOCIATED WITH TYPE 2 DIABETES MELLITUS (HCC): ICD-10-CM

## 2025-05-23 RX ORDER — METFORMIN HYDROCHLORIDE 500 MG/1
TABLET, EXTENDED RELEASE ORAL
Qty: 360 TABLET | Refills: 0 | Status: SHIPPED | OUTPATIENT
Start: 2025-05-23

## 2025-05-23 RX ORDER — GLIPIZIDE 5 MG/1
5 TABLET ORAL 2 TIMES DAILY
Qty: 180 TABLET | Refills: 0 | Status: SHIPPED | OUTPATIENT
Start: 2025-05-23

## 2025-05-23 NOTE — PATIENT COMMUNICATION
Received request via: Patient    Was the patient seen in the last year in this department? Yes    Does the patient have an active prescription (recently filled or refills available) for medication(s) requested? No    Pharmacy Name:     Does the patient have senior living Plus and need 100-day supply? (This applies to ALL medications) Patient does not have SCP

## 2025-06-03 DIAGNOSIS — M80.00XS AGE-RELATED OSTEOPOROSIS WITH CURRENT PATHOLOGICAL FRACTURE, SEQUELA: ICD-10-CM

## 2025-06-04 RX ORDER — ALENDRONATE SODIUM 70 MG/1
TABLET ORAL
Qty: 12 TABLET | Refills: 0 | Status: SHIPPED | OUTPATIENT
Start: 2025-06-04

## 2025-06-04 NOTE — TELEPHONE ENCOUNTER
Received request via: Pharmacy    Was the patient seen in the last year in this department? Yes    Does the patient have an active prescription (recently filled or refills available) for medication(s) requested? No    Pharmacy Name: [facility] [place]    Does the patient have jail Plus and need 100-day supply? (This applies to ALL medications) Patient does not have SCP

## 2025-06-17 ENCOUNTER — PATIENT MESSAGE (OUTPATIENT)
Dept: MEDICAL GROUP | Facility: PHYSICIAN GROUP | Age: 80
End: 2025-06-17
Payer: MEDICARE

## 2025-06-17 DIAGNOSIS — E11.69 HYPERLIPIDEMIA ASSOCIATED WITH TYPE 2 DIABETES MELLITUS (HCC): Primary | ICD-10-CM

## 2025-06-17 DIAGNOSIS — E78.5 HYPERLIPIDEMIA ASSOCIATED WITH TYPE 2 DIABETES MELLITUS (HCC): Primary | ICD-10-CM

## 2025-06-18 RX ORDER — ROSUVASTATIN CALCIUM 5 MG/1
5 TABLET, COATED ORAL EVERY EVENING
Qty: 90 TABLET | Refills: 1 | Status: SHIPPED | OUTPATIENT
Start: 2025-06-18

## 2025-06-18 NOTE — PATIENT COMMUNICATION
Medical Necessity Information: It is in your best interest to select a reason for this procedure from the list below. All of these items fulfill various CMS LCD requirements except lesion extends to a margin. Received request via: Patient    Was the patient seen in the last year in this department? Yes    Does the patient have an active prescription (recently filled or refills available) for medication(s) requested? No    Pharmacy Name:     Does the patient have [facility] and need 100-day supply? (This applies to ALL medications)    Include Z78.9 (Other Specified Conditions Influencing Health Status) As An Associated Diagnosis?: Yes Add Nub Associated Diagnoses If Applicable When Selecting Medical Necessity: No Medical Necessity Clause: This procedure was medically necessary because the lesion that was treated was: Lab: [id number] Lab Facility: [id number] Accession #: [id number] Surgeon (Optional): BELKIS Size Of Lesion In Cm: 0.5 Excision Method: Slit Saucerization Depth: dermis and superficial adipose tissue Repair Type: None (Simple) Suturegard Retention Suture: 2-0 Nylon Retention Suture Bite Size: 3 mm Length To Time In Minutes Device Was In Place: 10 Number Of Hemigard Strips Per Side: 1 Undermining Type: Entire Wound Debridement Text: The wound edges were debrided prior to proceeding with the closure to facilitate wound healing. Helical Rim Text: The closure involved the helical rim. Vermilion Border Text: The closure involved the vermilion border. Nostril Rim Text: The closure involved the nostril rim. Retention Suture Text: Retention sutures were placed to support the closure and prevent dehiscence. Suture Removal: 10 days Epidermal Closure Graft Donor Site (Optional): simple interrupted Graft Donor Site Bandage (Optional-Leave Blank If You Don't Want In Note): Steri-strips and a pressure bandage were applied to the donor site. Detail Level: Detailed Excision Depth: adipose tissue Scalpel Size: 15 blade Anesthesia Type: 1% lidocaine with epinephrine Additional Anesthesia Volume In Cc: 6 Hemostasis: Electrocautery Estimated Blood Loss (Cc): minimal Anesthesia Type: 1% lidocaine with epinephrine and a 1:10 solution of 8.4% sodium bicarbonate Deep Sutures: 4-0 Vicryl Epidermal Sutures: 4-0 Ethilon Wound Care: Petrolatum Dressing: dry sterile dressing Suturegard Intro: Intraoperative tissue expansion was performed, utilizing the SUTUREGARD device, in order to reduce wound tension. Suturegard Body: The suture ends were repeatedly re-tightened and re-clamped to achieve the desired tissue expansion. Hemigard Intro: Due to skin fragility and wound tension, it was decided to use HEMIGARD adhesive retention suture devices to permit a linear closure. The skin was cleaned and dried for a 6cm distance away from the wound. Excessive hair, if present, was removed to allow for adhesion. Hemigard Postcare Instructions: The HEMIGARD strips are to remain completely dry for at least 5-7 days. Complex Repair Preamble Text (Leave Blank If You Do Not Want): Extensive wide undermining was performed. Intermediate Repair Preamble Text (Leave Blank If You Do Not Want): Undermining was performed with blunt dissection. Fusiform Excision Additional Text (Leave Blank If You Do Not Want): The margin was drawn around the clinically apparent lesion.  A fusiform shape was then drawn on the skin incorporating the lesion and margins.  Incisions were then made along these lines to the appropriate tissue plane and the lesion was extirpated. Eliptical Excision Additional Text (Leave Blank If You Do Not Want): The margin was drawn around the clinically apparent lesion.  An elliptical shape was then drawn on the skin incorporating the lesion and margins.  Incisions were then made along these lines to the appropriate tissue plane and the lesion was extirpated. Saucerization Excision Additional Text (Leave Blank If You Do Not Want): The margin was drawn around the clinically apparent lesion.  Incisions were then made along these lines, in a tangential fashion, to the appropriate tissue plane and the lesion was extirpated. Slit Excision Additional Text (Leave Blank If You Do Not Want): A linear line was drawn on the skin overlying the lesion. An incision was made slowly until the lesion was visualized.  Once visualized, the lesion was removed with blunt dissection. Excisional Biopsy Additional Text (Leave Blank If You Do Not Want): The margin was drawn around the clinically apparent lesion. An elliptical shape was then drawn on the skin incorporating the lesion and margins.  Incisions were then made along these lines to the appropriate tissue plane and the lesion was extirpated. Perilesional Excision Additional Text (Leave Blank If You Do Not Want): The margin was drawn around the clinically apparent lesion. Incisions were then made along these lines to the appropriate tissue plane and the lesion was extirpated. Repair Performed By Another Provider Text (Leave Blank If You Do Not Want): After the tissue was excised the defect was repaired by another provider. No Repair - Repaired With Adjacent Surgical Defect Text (Leave Blank If You Do Not Want): After the excision the defect was repaired concurrently with another surgical defect which was in close approximation. Adjacent Tissue Transfer Text: The defect edges were debeveled with a #15 scalpel blade.  Given the location of the defect and the proximity to free margins an adjacent tissue transfer was deemed most appropriate.  Using a sterile surgical marker, an appropriate flap was drawn incorporating the defect and placing the expected incisions within the relaxed skin tension lines where possible.    The area thus outlined was incised deep to adipose tissue with a #15 scalpel blade.  The skin margins were undermined to an appropriate distance in all directions utilizing iris scissors. Advancement Flap (Single) Text: The defect edges were debeveled with a #15 scalpel blade.  Given the location of the defect and the proximity to free margins a single advancement flap was deemed most appropriate.  Using a sterile surgical marker, an appropriate advancement flap was drawn incorporating the defect and placing the expected incisions within the relaxed skin tension lines where possible.    The area thus outlined was incised deep to adipose tissue with a #15 scalpel blade.  The skin margins were undermined to an appropriate distance in all directions utilizing iris scissors. Advancement Flap (Double) Text: The defect edges were debeveled with a #15 scalpel blade.  Given the location of the defect and the proximity to free margins a double advancement flap was deemed most appropriate.  Using a sterile surgical marker, the appropriate advancement flaps were drawn incorporating the defect and placing the expected incisions within the relaxed skin tension lines where possible.    The area thus outlined was incised deep to adipose tissue with a #15 scalpel blade.  The skin margins were undermined to an appropriate distance in all directions utilizing iris scissors. Burow's Advancement Flap Text: The defect edges were debeveled with a #15 scalpel blade.  Given the location of the defect and the proximity to free margins a Burow's advancement flap was deemed most appropriate.  Using a sterile surgical marker, the appropriate advancement flap was drawn incorporating the defect and placing the expected incisions within the relaxed skin tension lines where possible.    The area thus outlined was incised deep to adipose tissue with a #15 scalpel blade.  The skin margins were undermined to an appropriate distance in all directions utilizing iris scissors. Chonodrocutaneous Helical Advancement Flap Text: The defect edges were debeveled with a #15 scalpel blade.  Given the location of the defect and the proximity to free margins a chondrocutaneous helical advancement flap was deemed most appropriate.  Using a sterile surgical marker, the appropriate advancement flap was drawn incorporating the defect and placing the expected incisions within the relaxed skin tension lines where possible.    The area thus outlined was incised deep to adipose tissue with a #15 scalpel blade.  The skin margins were undermined to an appropriate distance in all directions utilizing iris scissors. Crescentic Advancement Flap Text: The defect edges were debeveled with a #15 scalpel blade.  Given the location of the defect and the proximity to free margins a crescentic advancement flap was deemed most appropriate.  Using a sterile surgical marker, the appropriate advancement flap was drawn incorporating the defect and placing the expected incisions within the relaxed skin tension lines where possible.    The area thus outlined was incised deep to adipose tissue with a #15 scalpel blade.  The skin margins were undermined to an appropriate distance in all directions utilizing iris scissors. A-T Advancement Flap Text: The defect edges were debeveled with a #15 scalpel blade.  Given the location of the defect, shape of the defect and the proximity to free margins an A-T advancement flap was deemed most appropriate.  Using a sterile surgical marker, an appropriate advancement flap was drawn incorporating the defect and placing the expected incisions within the relaxed skin tension lines where possible.    The area thus outlined was incised deep to adipose tissue with a #15 scalpel blade.  The skin margins were undermined to an appropriate distance in all directions utilizing iris scissors. O-T Advancement Flap Text: The defect edges were debeveled with a #15 scalpel blade.  Given the location of the defect, shape of the defect and the proximity to free margins an O-T advancement flap was deemed most appropriate.  Using a sterile surgical marker, an appropriate advancement flap was drawn incorporating the defect and placing the expected incisions within the relaxed skin tension lines where possible.    The area thus outlined was incised deep to adipose tissue with a #15 scalpel blade.  The skin margins were undermined to an appropriate distance in all directions utilizing iris scissors. O-L Flap Text: The defect edges were debeveled with a #15 scalpel blade.  Given the location of the defect, shape of the defect and the proximity to free margins an O-L flap was deemed most appropriate.  Using a sterile surgical marker, an appropriate advancement flap was drawn incorporating the defect and placing the expected incisions within the relaxed skin tension lines where possible.    The area thus outlined was incised deep to adipose tissue with a #15 scalpel blade.  The skin margins were undermined to an appropriate distance in all directions utilizing iris scissors. O-Z Flap Text: The defect edges were debeveled with a #15 scalpel blade.  Given the location of the defect, shape of the defect and the proximity to free margins an O-Z flap was deemed most appropriate.  Using a sterile surgical marker, an appropriate transposition flap was drawn incorporating the defect and placing the expected incisions within the relaxed skin tension lines where possible. The area thus outlined was incised deep to adipose tissue with a #15 scalpel blade.  The skin margins were undermined to an appropriate distance in all directions utilizing iris scissors. Double O-Z Flap Text: The defect edges were debeveled with a #15 scalpel blade.  Given the location of the defect, shape of the defect and the proximity to free margins a Double O-Z flap was deemed most appropriate.  Using a sterile surgical marker, an appropriate transposition flap was drawn incorporating the defect and placing the expected incisions within the relaxed skin tension lines where possible. The area thus outlined was incised deep to adipose tissue with a #15 scalpel blade.  The skin margins were undermined to an appropriate distance in all directions utilizing iris scissors. V-Y Flap Text: The defect edges were debeveled with a #15 scalpel blade.  Given the location of the defect, shape of the defect and the proximity to free margins a V-Y flap was deemed most appropriate.  Using a sterile surgical marker, an appropriate advancement flap was drawn incorporating the defect and placing the expected incisions within the relaxed skin tension lines where possible.    The area thus outlined was incised deep to adipose tissue with a #15 scalpel blade.  The skin margins were undermined to an appropriate distance in all directions utilizing iris scissors. Mercedes Flap Text: The defect edges were debeveled with a #15 scalpel blade.  Given the location of the defect, shape of the defect and the proximity to free margins a Mercedes flap was deemed most appropriate.  Using a sterile surgical marker, an appropriate advancement flap was drawn incorporating the defect and placing the expected incisions within the relaxed skin tension lines where possible. The area thus outlined was incised deep to adipose tissue with a #15 scalpel blade.  The skin margins were undermined to an appropriate distance in all directions utilizing iris scissors. Modified Advancement Flap Text: The defect edges were debeveled with a #15 scalpel blade.  Given the location of the defect, shape of the defect and the proximity to free margins a modified advancement flap was deemed most appropriate.  Using a sterile surgical marker, an appropriate advancement flap was drawn incorporating the defect and placing the expected incisions within the relaxed skin tension lines where possible.    The area thus outlined was incised deep to adipose tissue with a #15 scalpel blade.  The skin margins were undermined to an appropriate distance in all directions utilizing iris scissors. Mucosal Advancement Flap Text: Given the location of the defect, shape of the defect and the proximity to free margins a mucosal advancement flap was deemed most appropriate. Incisions were made with a 15 blade scalpel in the appropriate fashion along the cutaneous vermillion border and the mucosal lip. The remaining actinically damaged mucosal tissue was excised.  The mucosal advancement flap was then elevated to the gingival sulcus with care taken to preserve the neurovascular structures and advanced into the primary defect. Care was taken to ensure that precise realignment of the vermilion border was achieved. Hatchet Flap Text: The defect edges were debeveled with a #15 scalpel blade.  Given the location of the defect, shape of the defect and the proximity to free margins a hatchet flap was deemed most appropriate.  Using a sterile surgical marker, an appropriate hatchet flap was drawn incorporating the defect and placing the expected incisions within the relaxed skin tension lines where possible.    The area thus outlined was incised deep to adipose tissue with a #15 scalpel blade.  The skin margins were undermined to an appropriate distance in all directions utilizing iris scissors. Rotation Flap Text: The defect edges were debeveled with a #15 scalpel blade.  Given the location of the defect, shape of the defect and the proximity to free margins a rotation flap was deemed most appropriate.  Using a sterile surgical marker, an appropriate rotation flap was drawn incorporating the defect and placing the expected incisions within the relaxed skin tension lines where possible.    The area thus outlined was incised deep to adipose tissue with a #15 scalpel blade.  The skin margins were undermined to an appropriate distance in all directions utilizing iris scissors. Bilateral Rotation Flap Text: The defect edges were debeveled with a #15 scalpel blade. Given the location of the defect, shape of the defect and the proximity to free margins a bilateral rotation flap was deemed most appropriate. Using a sterile surgical marker, an appropriate rotation flap was drawn incorporating the defect and placing the expected incisions within the relaxed skin tension lines where possible. The area thus outlined was incised deep to adipose tissue with a #15 scalpel blade. The skin margins were undermined to an appropriate distance in all directions utilizing iris scissors. Following this, the designed flap was carried over into the primary defect and sutured into place. Spiral Flap Text: The defect edges were debeveled with a #15 scalpel blade.  Given the location of the defect, shape of the defect and the proximity to free margins a spiral flap was deemed most appropriate.  Using a sterile surgical marker, an appropriate rotation flap was drawn incorporating the defect and placing the expected incisions within the relaxed skin tension lines where possible. The area thus outlined was incised deep to adipose tissue with a #15 scalpel blade.  The skin margins were undermined to an appropriate distance in all directions utilizing iris scissors. Staged Advancement Flap Text: The defect edges were debeveled with a #15 scalpel blade.  Given the location of the defect, shape of the defect and the proximity to free margins a staged advancement flap was deemed most appropriate.  Using a sterile surgical marker, an appropriate advancement flap was drawn incorporating the defect and placing the expected incisions within the relaxed skin tension lines where possible. The area thus outlined was incised deep to adipose tissue with a #15 scalpel blade.  The skin margins were undermined to an appropriate distance in all directions utilizing iris scissors. Star Wedge Flap Text: The defect edges were debeveled with a #15 scalpel blade.  Given the location of the defect, shape of the defect and the proximity to free margins a star wedge flap was deemed most appropriate.  Using a sterile surgical marker, an appropriate rotation flap was drawn incorporating the defect and placing the expected incisions within the relaxed skin tension lines where possible. The area thus outlined was incised deep to adipose tissue with a #15 scalpel blade.  The skin margins were undermined to an appropriate distance in all directions utilizing iris scissors. Transposition Flap Text: The defect edges were debeveled with a #15 scalpel blade.  Given the location of the defect and the proximity to free margins a transposition flap was deemed most appropriate.  Using a sterile surgical marker, an appropriate transposition flap was drawn incorporating the defect.    The area thus outlined was incised deep to adipose tissue with a #15 scalpel blade.  The skin margins were undermined to an appropriate distance in all directions utilizing iris scissors. Muscle Hinge Flap Text: The defect edges were debeveled with a #15 scalpel blade.  Given the size, depth and location of the defect and the proximity to free margins a muscle hinge flap was deemed most appropriate.  Using a sterile surgical marker, an appropriate hinge flap was drawn incorporating the defect. The area thus outlined was incised with a #15 scalpel blade.  The skin margins were undermined to an appropriate distance in all directions utilizing iris scissors. Mustarde Flap Text: The defect edges were debeveled with a #15 scalpel blade.  Given the size, depth and location of the defect and the proximity to free margins a Mustarde flap was deemed most appropriate.  Using a sterile surgical marker, an appropriate flap was drawn incorporating the defect. The area thus outlined was incised with a #15 scalpel blade.  The skin margins were undermined to an appropriate distance in all directions utilizing iris scissors. Nasal Turnover Hinge Flap Text: The defect edges were debeveled with a #15 scalpel blade.  Given the size, depth, location of the defect and the defect being full thickness a nasal turnover hinge flap was deemed most appropriate.  Using a sterile surgical marker, an appropriate hinge flap was drawn incorporating the defect. The area thus outlined was incised with a #15 scalpel blade. The flap was designed to recreate the nasal mucosal lining and the alar rim. The skin margins were undermined to an appropriate distance in all directions utilizing iris scissors. Nasalis-Muscle-Based Myocutaneous Island Pedicle Flap Text: Using a #15 blade, an incision was made around the donor flap to the level of the nasalis muscle. Wide lateral undermining was then performed in both the subcutaneous plane above the nasalis muscle, and in a submuscular plane just above periosteum. This allowed the formation of a free nasalis muscle axial pedicle (based on the angular artery) which was still attached to the actual cutaneous flap, increasing its mobility and vascular viability. Hemostasis was obtained with pinpoint electrocoagulation. The flap was mobilized into position and the pivotal anchor points positioned and stabilized with buried interrupted sutures. Subcutaneous and dermal tissues were closed in a multilayered fashion with sutures. Tissue redundancies were excised, and the epidermal edges were apposed without significant tension and sutured with sutures. Orbicularis Oris Muscle Flap Text: The defect edges were debeveled with a #15 scalpel blade.  Given that the defect affected the competency of the oral sphincter an obicularis oris muscle flap was deemed most appropriate to restore this competency and normal muscle function.  Using a sterile surgical marker, an appropriate flap was drawn incorporating the defect. The area thus outlined was incised with a #15 scalpel blade. Melolabial Transposition Flap Text: The defect edges were debeveled with a #15 scalpel blade.  Given the location of the defect and the proximity to free margins a melolabial flap was deemed most appropriate.  Using a sterile surgical marker, an appropriate melolabial transposition flap was drawn incorporating the defect.    The area thus outlined was incised deep to adipose tissue with a #15 scalpel blade.  The skin margins were undermined to an appropriate distance in all directions utilizing iris scissors. Rhombic Flap Text: The defect edges were debeveled with a #15 scalpel blade.  Given the location of the defect and the proximity to free margins a rhombic flap was deemed most appropriate.  Using a sterile surgical marker, an appropriate rhombic flap was drawn incorporating the defect.    The area thus outlined was incised deep to adipose tissue with a #15 scalpel blade.  The skin margins were undermined to an appropriate distance in all directions utilizing iris scissors. Rhomboid Transposition Flap Text: The defect edges were debeveled with a #15 scalpel blade.  Given the location of the defect and the proximity to free margins a rhomboid transposition flap was deemed most appropriate.  Using a sterile surgical marker, an appropriate rhomboid flap was drawn incorporating the defect.    The area thus outlined was incised deep to adipose tissue with a #15 scalpel blade.  The skin margins were undermined to an appropriate distance in all directions utilizing iris scissors. Bi-Rhombic Flap Text: The defect edges were debeveled with a #15 scalpel blade.  Given the location of the defect and the proximity to free margins a bi-rhombic flap was deemed most appropriate.  Using a sterile surgical marker, an appropriate rhombic flap was drawn incorporating the defect. The area thus outlined was incised deep to adipose tissue with a #15 scalpel blade.  The skin margins were undermined to an appropriate distance in all directions utilizing iris scissors. Helical Rim Advancement Flap Text: The defect edges were debeveled with a #15 blade scalpel.  Given the location of the defect and the proximity to free margins (helical rim) a double helical rim advancement flap was deemed most appropriate.  Using a sterile surgical marker, the appropriate advancement flaps were drawn incorporating the defect and placing the expected incisions between the helical rim and antihelix where possible.  The area thus outlined was incised through and through with a #15 scalpel blade.  With a skin hook and iris scissors, the flaps were gently and sharply undermined and freed up. Bilateral Helical Rim Advancement Flap Text: The defect edges were debeveled with a #15 blade scalpel.  Given the location of the defect and the proximity to free margins (helical rim) a bilateral helical rim advancement flap was deemed most appropriate.  Using a sterile surgical marker, the appropriate advancement flaps were drawn incorporating the defect and placing the expected incisions between the helical rim and antihelix where possible.  The area thus outlined was incised through and through with a #15 scalpel blade.  With a skin hook and iris scissors, the flaps were gently and sharply undermined and freed up. Ear Star Wedge Flap Text: The defect edges were debeveled with a #15 blade scalpel.  Given the location of the defect and the proximity to free margins (helical rim) an ear star wedge flap was deemed most appropriate.  Using a sterile surgical marker, the appropriate flap was drawn incorporating the defect and placing the expected incisions between the helical rim and antihelix where possible.  The area thus outlined was incised through and through with a #15 scalpel blade. Banner Transposition Flap Text: The defect edges were debeveled with a #15 scalpel blade.  Given the location of the defect and the proximity to free margins a Banner transposition flap was deemed most appropriate.  Using a sterile surgical marker, an appropriate flap drawn around the defect. The area thus outlined was incised deep to adipose tissue with a #15 scalpel blade.  The skin margins were undermined to an appropriate distance in all directions utilizing iris scissors. Bilobed Flap Text: The defect edges were debeveled with a #15 scalpel blade.  Given the location of the defect and the proximity to free margins a bilobe flap was deemed most appropriate.  Using a sterile surgical marker, an appropriate bilobe flap drawn around the defect.    The area thus outlined was incised deep to adipose tissue with a #15 scalpel blade.  The skin margins were undermined to an appropriate distance in all directions utilizing iris scissors. Bilobed Transposition Flap Text: The defect edges were debeveled with a #15 scalpel blade.  Given the location of the defect and the proximity to free margins a bilobed transposition flap was deemed most appropriate.  Using a sterile surgical marker, an appropriate bilobe flap drawn around the defect.    The area thus outlined was incised deep to adipose tissue with a #15 scalpel blade.  The skin margins were undermined to an appropriate distance in all directions utilizing iris scissors. Trilobed Flap Text: The defect edges were debeveled with a #15 scalpel blade.  Given the location of the defect and the proximity to free margins a trilobed flap was deemed most appropriate.  Using a sterile surgical marker, an appropriate trilobed flap drawn around the defect.    The area thus outlined was incised deep to adipose tissue with a #15 scalpel blade.  The skin margins were undermined to an appropriate distance in all directions utilizing iris scissors. Dorsal Nasal Flap Text: The defect edges were debeveled with a #15 scalpel blade.  Given the location of the defect and the proximity to free margins a dorsal nasal flap was deemed most appropriate.  Using a sterile surgical marker, an appropriate dorsal nasal flap was drawn around the defect.    The area thus outlined was incised deep to adipose tissue with a #15 scalpel blade.  The skin margins were undermined to an appropriate distance in all directions utilizing iris scissors. Island Pedicle Flap Text: The defect edges were debeveled with a #15 scalpel blade.  Given the location of the defect, shape of the defect and the proximity to free margins an island pedicle advancement flap was deemed most appropriate.  Using a sterile surgical marker, an appropriate advancement flap was drawn incorporating the defect, outlining the appropriate donor tissue and placing the expected incisions within the relaxed skin tension lines where possible.    The area thus outlined was incised deep to adipose tissue with a #15 scalpel blade.  The skin margins were undermined to an appropriate distance in all directions around the primary defect and laterally outward around the island pedicle utilizing iris scissors.  There was minimal undermining beneath the pedicle flap. Island Pedicle Flap With Canthal Suspension Text: The defect edges were debeveled with a #15 scalpel blade.  Given the location of the defect, shape of the defect and the proximity to free margins an island pedicle advancement flap was deemed most appropriate.  Using a sterile surgical marker, an appropriate advancement flap was drawn incorporating the defect, outlining the appropriate donor tissue and placing the expected incisions within the relaxed skin tension lines where possible. The area thus outlined was incised deep to adipose tissue with a #15 scalpel blade.  The skin margins were undermined to an appropriate distance in all directions around the primary defect and laterally outward around the island pedicle utilizing iris scissors.  There was minimal undermining beneath the pedicle flap. A suspension suture was placed in the canthal tendon to prevent tension and prevent ectropion. Alar Island Pedicle Flap Text: The defect edges were debeveled with a #15 scalpel blade.  Given the location of the defect, shape of the defect and the proximity to the alar rim an island pedicle advancement flap was deemed most appropriate.  Using a sterile surgical marker, an appropriate advancement flap was drawn incorporating the defect, outlining the appropriate donor tissue and placing the expected incisions within the nasal ala running parallel to the alar rim. The area thus outlined was incised with a #15 scalpel blade.  The skin margins were undermined minimally to an appropriate distance in all directions around the primary defect and laterally outward around the island pedicle utilizing iris scissors.  There was minimal undermining beneath the pedicle flap. Double Island Pedicle Flap Text: The defect edges were debeveled with a #15 scalpel blade.  Given the location of the defect, shape of the defect and the proximity to free margins a double island pedicle advancement flap was deemed most appropriate.  Using a sterile surgical marker, an appropriate advancement flap was drawn incorporating the defect, outlining the appropriate donor tissue and placing the expected incisions within the relaxed skin tension lines where possible.    The area thus outlined was incised deep to adipose tissue with a #15 scalpel blade.  The skin margins were undermined to an appropriate distance in all directions around the primary defect and laterally outward around the island pedicle utilizing iris scissors.  There was minimal undermining beneath the pedicle flap. Island Pedicle Flap-Requiring Vessel Identification Text: The defect edges were debeveled with a #15 scalpel blade.  Given the location of the defect, shape of the defect and the proximity to free margins an island pedicle advancement flap was deemed most appropriate.  Using a sterile surgical marker, an appropriate advancement flap was drawn, based on the axial vessel mentioned above, incorporating the defect, outlining the appropriate donor tissue and placing the expected incisions within the relaxed skin tension lines where possible.    The area thus outlined was incised deep to adipose tissue with a #15 scalpel blade.  The skin margins were undermined to an appropriate distance in all directions around the primary defect and laterally outward around the island pedicle utilizing iris scissors.  There was minimal undermining beneath the pedicle flap. Keystone Flap Text: The defect edges were debeveled with a #15 scalpel blade.  Given the location of the defect, shape of the defect a keystone flap was deemed most appropriate.  Using a sterile surgical marker, an appropriate keystone flap was drawn incorporating the defect, outlining the appropriate donor tissue and placing the expected incisions within the relaxed skin tension lines where possible. The area thus outlined was incised deep to adipose tissue with a #15 scalpel blade.  The skin margins were undermined to an appropriate distance in all directions around the primary defect and laterally outward around the flap utilizing iris scissors. O-T Plasty Text: The defect edges were debeveled with a #15 scalpel blade.  Given the location of the defect, shape of the defect and the proximity to free margins an O-T plasty was deemed most appropriate.  Using a sterile surgical marker, an appropriate O-T plasty was drawn incorporating the defect and placing the expected incisions within the relaxed skin tension lines where possible.    The area thus outlined was incised deep to adipose tissue with a #15 scalpel blade.  The skin margins were undermined to an appropriate distance in all directions utilizing iris scissors. O-Z Plasty Text: The defect edges were debeveled with a #15 scalpel blade.  Given the location of the defect, shape of the defect and the proximity to free margins an O-Z plasty (double transposition flap) was deemed most appropriate.  Using a sterile surgical marker, the appropriate transposition flaps were drawn incorporating the defect and placing the expected incisions within the relaxed skin tension lines where possible.    The area thus outlined was incised deep to adipose tissue with a #15 scalpel blade.  The skin margins were undermined to an appropriate distance in all directions utilizing iris scissors.  Hemostasis was achieved with electrocautery.  The flaps were then transposed into place, one clockwise and the other counterclockwise, and anchored with interrupted buried subcutaneous sutures. Double O-Z Plasty Text: The defect edges were debeveled with a #15 scalpel blade.  Given the location of the defect, shape of the defect and the proximity to free margins a Double O-Z plasty (double transposition flap) was deemed most appropriate.  Using a sterile surgical marker, the appropriate transposition flaps were drawn incorporating the defect and placing the expected incisions within the relaxed skin tension lines where possible. The area thus outlined was incised deep to adipose tissue with a #15 scalpel blade.  The skin margins were undermined to an appropriate distance in all directions utilizing iris scissors.  Hemostasis was achieved with electrocautery.  The flaps were then transposed into place, one clockwise and the other counterclockwise, and anchored with interrupted buried subcutaneous sutures. V-Y Plasty Text: The defect edges were debeveled with a #15 scalpel blade.  Given the location of the defect, shape of the defect and the proximity to free margins an V-Y advancement flap was deemed most appropriate.  Using a sterile surgical marker, an appropriate advancement flap was drawn incorporating the defect and placing the expected incisions within the relaxed skin tension lines where possible.    The area thus outlined was incised deep to adipose tissue with a #15 scalpel blade.  The skin margins were undermined to an appropriate distance in all directions utilizing iris scissors. H Plasty Text: Given the location of the defect, shape of the defect and the proximity to free margins a H-plasty was deemed most appropriate for repair.  Using a sterile surgical marker, the appropriate advancement arms of the H-plasty were drawn incorporating the defect and placing the expected incisions within the relaxed skin tension lines where possible. The area thus outlined was incised deep to adipose tissue with a #15 scalpel blade. The skin margins were undermined to an appropriate distance in all directions utilizing iris scissors.  The opposing advancement arms were then advanced into place in opposite direction and anchored with interrupted buried subcutaneous sutures. W Plasty Text: The lesion was extirpated to the level of the fat with a #15 scalpel blade.  Given the location of the defect, shape of the defect and the proximity to free margins a W-plasty was deemed most appropriate for repair.  Using a sterile surgical marker, the appropriate transposition arms of the W-plasty were drawn incorporating the defect and placing the expected incisions within the relaxed skin tension lines where possible.    The area thus outlined was incised deep to adipose tissue with a #15 scalpel blade.  The skin margins were undermined to an appropriate distance in all directions utilizing iris scissors.  The opposing transposition arms were then transposed into place in opposite direction and anchored with interrupted buried subcutaneous sutures. Z Plasty Text: The lesion was extirpated to the level of the fat with a #15 scalpel blade.  Given the location of the defect, shape of the defect and the proximity to free margins a Z-plasty was deemed most appropriate for repair.  Using a sterile surgical marker, the appropriate transposition arms of the Z-plasty were drawn incorporating the defect and placing the expected incisions within the relaxed skin tension lines where possible.    The area thus outlined was incised deep to adipose tissue with a #15 scalpel blade.  The skin margins were undermined to an appropriate distance in all directions utilizing iris scissors.  The opposing transposition arms were then transposed into place in opposite direction and anchored with interrupted buried subcutaneous sutures. Double Z Plasty Text: The lesion was extirpated to the level of the fat with a #15 scalpel blade. Given the location of the defect, shape of the defect and the proximity to free margins a double Z-plasty was deemed most appropriate for repair. Using a sterile surgical marker, the appropriate transposition arms of the double Z-plasty were drawn incorporating the defect and placing the expected incisions within the relaxed skin tension lines where possible. The area thus outlined was incised deep to adipose tissue with a #15 scalpel blade. The skin margins were undermined to an appropriate distance in all directions utilizing iris scissors. The opposing transposition arms were then transposed and carried over into place in opposite direction and anchored with interrupted buried subcutaneous sutures. Zygomaticofacial Flap Text: Given the location of the defect, shape of the defect and the proximity to free margins a zygomaticofacial flap was deemed most appropriate for repair.  Using a sterile surgical marker, the appropriate flap was drawn incorporating the defect and placing the expected incisions within the relaxed skin tension lines where possible. The area thus outlined was incised deep to adipose tissue with a #15 scalpel blade with preservation of a vascular pedicle.  The skin margins were undermined to an appropriate distance in all directions utilizing iris scissors.  The flap was then placed into the defect and anchored with interrupted buried subcutaneous sutures. Cheek Interpolation Flap Text: A decision was made to reconstruct the defect utilizing an interpolation axial flap and a staged reconstruction.  A telfa template was made of the defect.  This telfa template was then used to outline the Cheek Interpolation flap.  The donor area for the pedicle flap was then injected with anesthesia.  The flap was excised through the skin and subcutaneous tissue down to the layer of the underlying musculature.  The interpolation flap was carefully excised within this deep plane to maintain its blood supply.  The edges of the donor site were undermined.   The donor site was closed in a primary fashion.  The pedicle was then rotated into position and sutured.  Once the tube was sutured into place, adequate blood supply was confirmed with blanching and refill.  The pedicle was then wrapped with xeroform gauze and dressed appropriately with a telfa and gauze bandage to ensure continued blood supply and protect the attached pedicle. Cheek-To-Nose Interpolation Flap Text: A decision was made to reconstruct the defect utilizing an interpolation axial flap and a staged reconstruction.  A telfa template was made of the defect.  This telfa template was then used to outline the Cheek-To-Nose Interpolation flap.  The donor area for the pedicle flap was then injected with anesthesia.  The flap was excised through the skin and subcutaneous tissue down to the layer of the underlying musculature.  The interpolation flap was carefully excised within this deep plane to maintain its blood supply.  The edges of the donor site were undermined.   The donor site was closed in a primary fashion.  The pedicle was then rotated into position and sutured.  Once the tube was sutured into place, adequate blood supply was confirmed with blanching and refill.  The pedicle was then wrapped with xeroform gauze and dressed appropriately with a telfa and gauze bandage to ensure continued blood supply and protect the attached pedicle. Interpolation Flap Text: A decision was made to reconstruct the defect utilizing an interpolation axial flap and a staged reconstruction.  A telfa template was made of the defect.  This telfa template was then used to outline the interpolation flap.  The donor area for the pedicle flap was then injected with anesthesia.  The flap was excised through the skin and subcutaneous tissue down to the layer of the underlying musculature.  The interpolation flap was carefully excised within this deep plane to maintain its blood supply.  The edges of the donor site were undermined.   The donor site was closed in a primary fashion.  The pedicle was then rotated into position and sutured.  Once the tube was sutured into place, adequate blood supply was confirmed with blanching and refill.  The pedicle was then wrapped with xeroform gauze and dressed appropriately with a telfa and gauze bandage to ensure continued blood supply and protect the attached pedicle. Melolabial Interpolation Flap Text: A decision was made to reconstruct the defect utilizing an interpolation axial flap and a staged reconstruction.  A telfa template was made of the defect.  This telfa template was then used to outline the melolabial interpolation flap.  The donor area for the pedicle flap was then injected with anesthesia.  The flap was excised through the skin and subcutaneous tissue down to the layer of the underlying musculature.  The pedicle flap was carefully excised within this deep plane to maintain its blood supply.  The edges of the donor site were undermined.   The donor site was closed in a primary fashion.  The pedicle was then rotated into position and sutured.  Once the tube was sutured into place, adequate blood supply was confirmed with blanching and refill.  The pedicle was then wrapped with xeroform gauze and dressed appropriately with a telfa and gauze bandage to ensure continued blood supply and protect the attached pedicle. Mastoid Interpolation Flap Text: A decision was made to reconstruct the defect utilizing an interpolation axial flap and a staged reconstruction.  A telfa template was made of the defect.  This telfa template was then used to outline the mastoid interpolation flap.  The donor area for the pedicle flap was then injected with anesthesia.  The flap was excised through the skin and subcutaneous tissue down to the layer of the underlying musculature.  The pedicle flap was carefully excised within this deep plane to maintain its blood supply.  The edges of the donor site were undermined.   The donor site was closed in a primary fashion.  The pedicle was then rotated into position and sutured.  Once the tube was sutured into place, adequate blood supply was confirmed with blanching and refill.  The pedicle was then wrapped with xeroform gauze and dressed appropriately with a telfa and gauze bandage to ensure continued blood supply and protect the attached pedicle. Posterior Auricular Interpolation Flap Text: A decision was made to reconstruct the defect utilizing an interpolation axial flap and a staged reconstruction.  A telfa template was made of the defect.  This telfa template was then used to outline the posterior auricular interpolation flap.  The donor area for the pedicle flap was then injected with anesthesia.  The flap was excised through the skin and subcutaneous tissue down to the layer of the underlying musculature.  The pedicle flap was carefully excised within this deep plane to maintain its blood supply.  The edges of the donor site were undermined.   The donor site was closed in a primary fashion.  The pedicle was then rotated into position and sutured.  Once the tube was sutured into place, adequate blood supply was confirmed with blanching and refill.  The pedicle was then wrapped with xeroform gauze and dressed appropriately with a telfa and gauze bandage to ensure continued blood supply and protect the attached pedicle. Paramedian Forehead Flap Text: A decision was made to reconstruct the defect utilizing an interpolation axial flap and a staged reconstruction.  A telfa template was made of the defect.  This telfa template was then used to outline the paramedian forehead pedicle flap.  The donor area for the pedicle flap was then injected with anesthesia.  The flap was excised through the skin and subcutaneous tissue down to the layer of the underlying musculature.  The pedicle flap was carefully excised within this deep plane to maintain its blood supply.  The edges of the donor site were undermined.   The donor site was closed in a primary fashion.  The pedicle was then rotated into position and sutured.  Once the tube was sutured into place, adequate blood supply was confirmed with blanching and refill.  The pedicle was then wrapped with xeroform gauze and dressed appropriately with a telfa and gauze bandage to ensure continued blood supply and protect the attached pedicle. Lip Wedge Excision Repair Text: Given the location of the defect and the proximity to free margins a full thickness wedge repair was deemed most appropriate.  Using a sterile surgical marker, the appropriate repair was drawn incorporating the defect and placing the expected incisions perpendicular to the vermilion border.  The vermilion border was also meticulously outlined to ensure appropriate reapproximation during the repair.  The area thus outlined was incised through and through with a #15 scalpel blade.  The muscularis and dermis were reaproximated with deep sutures following hemostasis. Care was taken to realign the vermilion border before proceeding with the superficial closure.  Once the vermilion was realigned the superfical and mucosal closure was finished. Ftsg Text: The defect edges were debeveled with a #15 scalpel blade.  Given the location of the defect, shape of the defect and the proximity to free margins a full thickness skin graft was deemed most appropriate.  Using a sterile surgical marker, the primary defect shape was transferred to the donor site. The area thus outlined was incised deep to adipose tissue with a #15 scalpel blade.  The harvested graft was then trimmed of adipose tissue until only dermis and epidermis was left.  The skin margins of the secondary defect were undermined to an appropriate distance in all directions utilizing iris scissors.  The secondary defect was closed with interrupted buried subcutaneous sutures.  The skin edges were then re-apposed with running  sutures.  The skin graft was then placed in the primary defect and oriented appropriately. Split-Thickness Skin Graft Text: The defect edges were debeveled with a #15 scalpel blade.  Given the location of the defect, shape of the defect and the proximity to free margins a split thickness skin graft was deemed most appropriate.  Using a sterile surgical marker, the primary defect shape was transferred to the donor site. The split thickness graft was then harvested.  The skin graft was then placed in the primary defect and oriented appropriately. Pinch Graft Text: The defect edges were debeveled with a #15 scalpel blade. Given the location of the defect, shape of the defect and the proximity to free margins a pinch graft was deemed most appropriate. Using a sterile surgical marker, the primary defect shape was transferred to the donor site. The area thus outlined was incised deep to adipose tissue with a #15 scalpel blade.  The harvested graft was then trimmed of adipose tissue until only dermis and epidermis was left. The skin margins of the secondary defect were undermined to an appropriate distance in all directions utilizing iris scissors.  The secondary defect was closed with interrupted buried subcutaneous sutures.  The skin edges were then re-apposed with running  sutures.  The skin graft was then placed in the primary defect and oriented appropriately. Burow's Graft Text: The defect edges were debeveled with a #15 scalpel blade.  Given the location of the defect, shape of the defect, the proximity to free margins and the presence of a standing cone deformity a Burow's skin graft was deemed most appropriate. The standing cone was removed and this tissue was then trimmed to the shape of the primary defect. The adipose tissue was also removed until only dermis and epidermis were left.  The skin margins of the secondary defect were undermined to an appropriate distance in all directions utilizing iris scissors.  The secondary defect was closed with interrupted buried subcutaneous sutures.  The skin edges were then re-apposed with running  sutures.  The skin graft was then placed in the primary defect and oriented appropriately. Cartilage Graft Text: The defect edges were debeveled with a #15 scalpel blade.  Given the location of the defect, shape of the defect, the fact the defect involved a full thickness cartilage defect a cartilage graft was deemed most appropriate.  An appropriate donor site was identified, cleansed, and anesthetized. The cartilage graft was then harvested and transferred to the recipient site, oriented appropriately and then sutured into place.  The secondary defect was then repaired using a primary closure. Composite Graft Text: The defect edges were debeveled with a #15 scalpel blade.  Given the location of the defect, shape of the defect, the proximity to free margins and the fact the defect was full thickness a composite graft was deemed most appropriate.  The defect was outline and then transferred to the donor site.  A full thickness graft was then excised from the donor site. The graft was then placed in the primary defect, oriented appropriately and then sutured into place.  The secondary defect was then repaired using a primary closure. Epidermal Autograft Text: The defect edges were debeveled with a #15 scalpel blade.  Given the location of the defect, shape of the defect and the proximity to free margins an epidermal autograft was deemed most appropriate.  Using a sterile surgical marker, the primary defect shape was transferred to the donor site. The epidermal graft was then harvested.  The skin graft was then placed in the primary defect and oriented appropriately. Dermal Autograft Text: The defect edges were debeveled with a #15 scalpel blade.  Given the location of the defect, shape of the defect and the proximity to free margins a dermal autograft was deemed most appropriate.  Using a sterile surgical marker, the primary defect shape was transferred to the donor site. The area thus outlined was incised deep to adipose tissue with a #15 scalpel blade.  The harvested graft was then trimmed of adipose and epidermal tissue until only dermis was left.  The skin graft was then placed in the primary defect and oriented appropriately. Skin Substitute Text: The defect edges were debeveled with a #15 scalpel blade.  Given the location of the defect, shape of the defect and the proximity to free margins a skin substitute graft was deemed most appropriate.  The graft material was trimmed to fit the size of the defect. The graft was then placed in the primary defect and oriented appropriately. Tissue Cultured Epidermal Autograft Text: The defect edges were debeveled with a #15 scalpel blade.  Given the location of the defect, shape of the defect and the proximity to free margins a tissue cultured epidermal autograft was deemed most appropriate.  The graft was then trimmed to fit the size of the defect.  The graft was then placed in the primary defect and oriented appropriately. Xenograft Text: The defect edges were debeveled with a #15 scalpel blade.  Given the location of the defect, shape of the defect and the proximity to free margins a xenograft was deemed most appropriate.  The graft was then trimmed to fit the size of the defect.  The graft was then placed in the primary defect and oriented appropriately. Purse String (Intermediate) Text: Given the location of the defect and the characteristics of the surrounding skin a purse string intermediate closure was deemed most appropriate.  Undermining was performed circumferentially around the surgical defect.  A purse string suture was then placed and tightened. Purse String (Simple) Text: Given the location of the defect and the characteristics of the surrounding skin a purse string simple closure was deemed most appropriate.  Undermining was performed circumferentially around the surgical defect.  A purse string suture was then placed and tightened. Complex Repair And Single Advancement Flap Text: The defect edges were debeveled with a #15 scalpel blade.  The primary defect was closed partially with a complex linear closure.  Given the location of the remaining defect, shape of the defect and the proximity to free margins a single advancement flap was deemed most appropriate for complete closure of the defect.  Using a sterile surgical marker, an appropriate advancement flap was drawn incorporating the defect and placing the expected incisions within the relaxed skin tension lines where possible.    The area thus outlined was incised deep to adipose tissue with a #15 scalpel blade.  The skin margins were undermined to an appropriate distance in all directions utilizing iris scissors. Complex Repair And Double Advancement Flap Text: The defect edges were debeveled with a #15 scalpel blade.  The primary defect was closed partially with a complex linear closure.  Given the location of the remaining defect, shape of the defect and the proximity to free margins a double advancement flap was deemed most appropriate for complete closure of the defect.  Using a sterile surgical marker, an appropriate advancement flap was drawn incorporating the defect and placing the expected incisions within the relaxed skin tension lines where possible.    The area thus outlined was incised deep to adipose tissue with a #15 scalpel blade.  The skin margins were undermined to an appropriate distance in all directions utilizing iris scissors. Complex Repair And Modified Advancement Flap Text: The defect edges were debeveled with a #15 scalpel blade.  The primary defect was closed partially with a complex linear closure.  Given the location of the remaining defect, shape of the defect and the proximity to free margins a modified advancement flap was deemed most appropriate for complete closure of the defect.  Using a sterile surgical marker, an appropriate advancement flap was drawn incorporating the defect and placing the expected incisions within the relaxed skin tension lines where possible.    The area thus outlined was incised deep to adipose tissue with a #15 scalpel blade.  The skin margins were undermined to an appropriate distance in all directions utilizing iris scissors. Complex Repair And A-T Advancement Flap Text: The defect edges were debeveled with a #15 scalpel blade.  The primary defect was closed partially with a complex linear closure.  Given the location of the remaining defect, shape of the defect and the proximity to free margins an A-T advancement flap was deemed most appropriate for complete closure of the defect.  Using a sterile surgical marker, an appropriate advancement flap was drawn incorporating the defect and placing the expected incisions within the relaxed skin tension lines where possible.    The area thus outlined was incised deep to adipose tissue with a #15 scalpel blade.  The skin margins were undermined to an appropriate distance in all directions utilizing iris scissors. Complex Repair And O-T Advancement Flap Text: The defect edges were debeveled with a #15 scalpel blade.  The primary defect was closed partially with a complex linear closure.  Given the location of the remaining defect, shape of the defect and the proximity to free margins an O-T advancement flap was deemed most appropriate for complete closure of the defect.  Using a sterile surgical marker, an appropriate advancement flap was drawn incorporating the defect and placing the expected incisions within the relaxed skin tension lines where possible.    The area thus outlined was incised deep to adipose tissue with a #15 scalpel blade.  The skin margins were undermined to an appropriate distance in all directions utilizing iris scissors. Complex Repair And O-L Flap Text: The defect edges were debeveled with a #15 scalpel blade.  The primary defect was closed partially with a complex linear closure.  Given the location of the remaining defect, shape of the defect and the proximity to free margins an O-L flap was deemed most appropriate for complete closure of the defect.  Using a sterile surgical marker, an appropriate flap was drawn incorporating the defect and placing the expected incisions within the relaxed skin tension lines where possible.    The area thus outlined was incised deep to adipose tissue with a #15 scalpel blade.  The skin margins were undermined to an appropriate distance in all directions utilizing iris scissors. Complex Repair And Bilobe Flap Text: The defect edges were debeveled with a #15 scalpel blade.  The primary defect was closed partially with a complex linear closure.  Given the location of the remaining defect, shape of the defect and the proximity to free margins a bilobe flap was deemed most appropriate for complete closure of the defect.  Using a sterile surgical marker, an appropriate advancement flap was drawn incorporating the defect and placing the expected incisions within the relaxed skin tension lines where possible.    The area thus outlined was incised deep to adipose tissue with a #15 scalpel blade.  The skin margins were undermined to an appropriate distance in all directions utilizing iris scissors. Complex Repair And Melolabial Flap Text: The defect edges were debeveled with a #15 scalpel blade.  The primary defect was closed partially with a complex linear closure.  Given the location of the remaining defect, shape of the defect and the proximity to free margins a melolabial flap was deemed most appropriate for complete closure of the defect.  Using a sterile surgical marker, an appropriate advancement flap was drawn incorporating the defect and placing the expected incisions within the relaxed skin tension lines where possible.    The area thus outlined was incised deep to adipose tissue with a #15 scalpel blade.  The skin margins were undermined to an appropriate distance in all directions utilizing iris scissors. Complex Repair And Rotation Flap Text: The defect edges were debeveled with a #15 scalpel blade.  The primary defect was closed partially with a complex linear closure.  Given the location of the remaining defect, shape of the defect and the proximity to free margins a rotation flap was deemed most appropriate for complete closure of the defect.  Using a sterile surgical marker, an appropriate advancement flap was drawn incorporating the defect and placing the expected incisions within the relaxed skin tension lines where possible.    The area thus outlined was incised deep to adipose tissue with a #15 scalpel blade.  The skin margins were undermined to an appropriate distance in all directions utilizing iris scissors. Complex Repair And Rhombic Flap Text: The defect edges were debeveled with a #15 scalpel blade.  The primary defect was closed partially with a complex linear closure.  Given the location of the remaining defect, shape of the defect and the proximity to free margins a rhombic flap was deemed most appropriate for complete closure of the defect.  Using a sterile surgical marker, an appropriate advancement flap was drawn incorporating the defect and placing the expected incisions within the relaxed skin tension lines where possible.    The area thus outlined was incised deep to adipose tissue with a #15 scalpel blade.  The skin margins were undermined to an appropriate distance in all directions utilizing iris scissors. Complex Repair And Transposition Flap Text: The defect edges were debeveled with a #15 scalpel blade.  The primary defect was closed partially with a complex linear closure.  Given the location of the remaining defect, shape of the defect and the proximity to free margins a transposition flap was deemed most appropriate for complete closure of the defect.  Using a sterile surgical marker, an appropriate advancement flap was drawn incorporating the defect and placing the expected incisions within the relaxed skin tension lines where possible.    The area thus outlined was incised deep to adipose tissue with a #15 scalpel blade.  The skin margins were undermined to an appropriate distance in all directions utilizing iris scissors. Complex Repair And V-Y Plasty Text: The defect edges were debeveled with a #15 scalpel blade.  The primary defect was closed partially with a complex linear closure.  Given the location of the remaining defect, shape of the defect and the proximity to free margins a V-Y plasty was deemed most appropriate for complete closure of the defect.  Using a sterile surgical marker, an appropriate advancement flap was drawn incorporating the defect and placing the expected incisions within the relaxed skin tension lines where possible.    The area thus outlined was incised deep to adipose tissue with a #15 scalpel blade.  The skin margins were undermined to an appropriate distance in all directions utilizing iris scissors. Complex Repair And M Plasty Text: The defect edges were debeveled with a #15 scalpel blade.  The primary defect was closed partially with a complex linear closure.  Given the location of the remaining defect, shape of the defect and the proximity to free margins an M plasty was deemed most appropriate for complete closure of the defect.  Using a sterile surgical marker, an appropriate advancement flap was drawn incorporating the defect and placing the expected incisions within the relaxed skin tension lines where possible.    The area thus outlined was incised deep to adipose tissue with a #15 scalpel blade.  The skin margins were undermined to an appropriate distance in all directions utilizing iris scissors. Complex Repair And Double M Plasty Text: The defect edges were debeveled with a #15 scalpel blade.  The primary defect was closed partially with a complex linear closure.  Given the location of the remaining defect, shape of the defect and the proximity to free margins a double M plasty was deemed most appropriate for complete closure of the defect.  Using a sterile surgical marker, an appropriate advancement flap was drawn incorporating the defect and placing the expected incisions within the relaxed skin tension lines where possible.    The area thus outlined was incised deep to adipose tissue with a #15 scalpel blade.  The skin margins were undermined to an appropriate distance in all directions utilizing iris scissors. Complex Repair And W Plasty Text: The defect edges were debeveled with a #15 scalpel blade.  The primary defect was closed partially with a complex linear closure.  Given the location of the remaining defect, shape of the defect and the proximity to free margins a W plasty was deemed most appropriate for complete closure of the defect.  Using a sterile surgical marker, an appropriate advancement flap was drawn incorporating the defect and placing the expected incisions within the relaxed skin tension lines where possible.    The area thus outlined was incised deep to adipose tissue with a #15 scalpel blade.  The skin margins were undermined to an appropriate distance in all directions utilizing iris scissors. Complex Repair And Z Plasty Text: The defect edges were debeveled with a #15 scalpel blade.  The primary defect was closed partially with a complex linear closure.  Given the location of the remaining defect, shape of the defect and the proximity to free margins a Z plasty was deemed most appropriate for complete closure of the defect.  Using a sterile surgical marker, an appropriate advancement flap was drawn incorporating the defect and placing the expected incisions within the relaxed skin tension lines where possible.    The area thus outlined was incised deep to adipose tissue with a #15 scalpel blade.  The skin margins were undermined to an appropriate distance in all directions utilizing iris scissors. Complex Repair And Dorsal Nasal Flap Text: The defect edges were debeveled with a #15 scalpel blade.  The primary defect was closed partially with a complex linear closure.  Given the location of the remaining defect, shape of the defect and the proximity to free margins a dorsal nasal flap was deemed most appropriate for complete closure of the defect.  Using a sterile surgical marker, an appropriate flap was drawn incorporating the defect and placing the expected incisions within the relaxed skin tension lines where possible.    The area thus outlined was incised deep to adipose tissue with a #15 scalpel blade.  The skin margins were undermined to an appropriate distance in all directions utilizing iris scissors. Complex Repair And Ftsg Text: The defect edges were debeveled with a #15 scalpel blade.  The primary defect was closed partially with a complex linear closure.  Given the location of the defect, shape of the defect and the proximity to free margins a full thickness skin graft was deemed most appropriate to repair the remaining defect.  The graft was trimmed to fit the size of the remaining defect.  The graft was then placed in the primary defect, oriented appropriately, and sutured into place. Complex Repair And Burow's Graft Text: The defect edges were debeveled with a #15 scalpel blade.  The primary defect was closed partially with a complex linear closure.  Given the location of the defect, shape of the defect, the proximity to free margins and the presence of a standing cone deformity a Burow's graft was deemed most appropriate to repair the remaining defect.  The graft was trimmed to fit the size of the remaining defect.  The graft was then placed in the primary defect, oriented appropriately, and sutured into place. Complex Repair And Split-Thickness Skin Graft Text: The defect edges were debeveled with a #15 scalpel blade.  The primary defect was closed partially with a complex linear closure.  Given the location of the defect, shape of the defect and the proximity to free margins a split thickness skin graft was deemed most appropriate to repair the remaining defect.  The graft was trimmed to fit the size of the remaining defect.  The graft was then placed in the primary defect, oriented appropriately, and sutured into place. Complex Repair And Epidermal Autograft Text: The defect edges were debeveled with a #15 scalpel blade.  The primary defect was closed partially with a complex linear closure.  Given the location of the defect, shape of the defect and the proximity to free margins an epidermal autograft was deemed most appropriate to repair the remaining defect.  The graft was trimmed to fit the size of the remaining defect.  The graft was then placed in the primary defect, oriented appropriately, and sutured into place. Complex Repair And Dermal Autograft Text: The defect edges were debeveled with a #15 scalpel blade.  The primary defect was closed partially with a complex linear closure.  Given the location of the defect, shape of the defect and the proximity to free margins an dermal autograft was deemed most appropriate to repair the remaining defect.  The graft was trimmed to fit the size of the remaining defect.  The graft was then placed in the primary defect, oriented appropriately, and sutured into place. Complex Repair And Tissue Cultured Epidermal Autograft Text: The defect edges were debeveled with a #15 scalpel blade.  The primary defect was closed partially with a complex linear closure.  Given the location of the defect, shape of the defect and the proximity to free margins an tissue cultured epidermal autograft was deemed most appropriate to repair the remaining defect.  The graft was trimmed to fit the size of the remaining defect.  The graft was then placed in the primary defect, oriented appropriately, and sutured into place. Complex Repair And Xenograft Text: The defect edges were debeveled with a #15 scalpel blade.  The primary defect was closed partially with a complex linear closure.  Given the location of the defect, shape of the defect and the proximity to free margins a xenograft was deemed most appropriate to repair the remaining defect.  The graft was trimmed to fit the size of the remaining defect.  The graft was then placed in the primary defect, oriented appropriately, and sutured into place. Complex Repair And Skin Substitute Graft Text: The defect edges were debeveled with a #15 scalpel blade.  The primary defect was closed partially with a complex linear closure.  Given the location of the remaining defect, shape of the defect and the proximity to free margins a skin substitute graft was deemed most appropriate to repair the remaining defect.  The graft was trimmed to fit the size of the remaining defect.  The graft was then placed in the primary defect, oriented appropriately, and sutured into place. Consent was obtained from the patient. The risks and benefits to therapy were discussed in detail. Specifically, the risks of infection, scarring, bleeding, prolonged wound healing, incomplete removal, allergy to anesthesia, nerve injury and recurrence were addressed. Prior to the procedure, the treatment site was clearly identified and confirmed by the patient. All components of Universal Protocol/PAUSE Rule completed. Post-Care Instructions: I reviewed with the patient in detail post-care instructions. Patient is not to engage in any heavy lifting, exercise, or swimming for the next 14 days. Should the patient develop any fevers, chills, bleeding, severe pain patient will contact the office immediately. Home Suture Removal Text: Patient was provided a home suture removal kit and will remove their sutures at home.  If they have any questions or difficulties they will call the office. Where Do You Want The Question To Include Opioid Counseling Located?: Case Summary Tab Billing Type: Third-Party Bill Information: Selecting Yes will display possible errors in your note based on the variables you have selected. This validation is only offered as a suggestion for you. PLEASE NOTE THAT THE VALIDATION TEXT WILL BE REMOVED WHEN YOU FINALIZE YOUR NOTE. IF YOU WANT TO FAX A PRELIMINARY NOTE YOU WILL NEED TO TOGGLE THIS TO 'NO' IF YOU DO NOT WANT IT IN YOUR FAXED NOTE.

## 2025-06-29 DIAGNOSIS — I10 ESSENTIAL HYPERTENSION: ICD-10-CM

## 2025-06-30 RX ORDER — AMLODIPINE BESYLATE 10 MG/1
TABLET ORAL
Qty: 90 TABLET | Refills: 1 | Status: SHIPPED | OUTPATIENT
Start: 2025-06-30

## 2025-06-30 NOTE — TELEPHONE ENCOUNTER
Received request via: Pharmacy    Was the patient seen in the last year in this department? Yes    Does the patient have an active prescription (recently filled or refills available) for medication(s) requested? No    Pharmacy Name: [facility]    Does the patient have [facility] Plus and need 100-day supply? (This applies to ALL medications) Patient does not have SCP

## 2025-07-30 ENCOUNTER — PATIENT MESSAGE (OUTPATIENT)
Dept: MEDICAL GROUP | Facility: PHYSICIAN GROUP | Age: 80
End: 2025-07-30
Payer: MEDICARE

## 2025-07-30 DIAGNOSIS — F32.A ANXIETY AND DEPRESSION: ICD-10-CM

## 2025-07-30 DIAGNOSIS — F41.9 ANXIETY AND DEPRESSION: ICD-10-CM

## 2025-07-30 RX ORDER — CITALOPRAM HYDROBROMIDE 20 MG/1
20 TABLET ORAL DAILY
Qty: 90 TABLET | Refills: 0 | Status: SHIPPED | OUTPATIENT
Start: 2025-07-30

## 2025-08-06 ENCOUNTER — OFFICE VISIT (OUTPATIENT)
Dept: MEDICAL GROUP | Facility: PHYSICIAN GROUP | Age: 80
End: 2025-08-06
Payer: MEDICARE

## 2025-08-06 ENCOUNTER — HOSPITAL ENCOUNTER (OUTPATIENT)
Dept: LAB | Facility: MEDICAL CENTER | Age: 80
End: 2025-08-06
Attending: FAMILY MEDICINE
Payer: MEDICARE

## 2025-08-06 VITALS
HEART RATE: 94 BPM | OXYGEN SATURATION: 94 % | HEIGHT: 62 IN | DIASTOLIC BLOOD PRESSURE: 60 MMHG | SYSTOLIC BLOOD PRESSURE: 104 MMHG | TEMPERATURE: 97.4 F | WEIGHT: 176.8 LBS | RESPIRATION RATE: 14 BRPM | BODY MASS INDEX: 32.54 KG/M2

## 2025-08-06 DIAGNOSIS — E11.22 HYPERTENSION ASSOCIATED WITH CHRONIC KIDNEY DISEASE DUE TO TYPE 2 DIABETES MELLITUS (HCC): ICD-10-CM

## 2025-08-06 DIAGNOSIS — E11.69 HYPERLIPIDEMIA ASSOCIATED WITH TYPE 2 DIABETES MELLITUS (HCC): ICD-10-CM

## 2025-08-06 DIAGNOSIS — N18.2 TYPE 2 DIABETES MELLITUS WITH STAGE 2 CHRONIC KIDNEY DISEASE, WITHOUT LONG-TERM CURRENT USE OF INSULIN (HCC): ICD-10-CM

## 2025-08-06 DIAGNOSIS — I12.9 HYPERTENSION ASSOCIATED WITH CHRONIC KIDNEY DISEASE DUE TO TYPE 2 DIABETES MELLITUS (HCC): ICD-10-CM

## 2025-08-06 DIAGNOSIS — Z00.00 ENCOUNTER FOR MEDICARE ANNUAL WELLNESS EXAM: Primary | ICD-10-CM

## 2025-08-06 DIAGNOSIS — M80.00XS AGE-RELATED OSTEOPOROSIS WITH CURRENT PATHOLOGICAL FRACTURE, SEQUELA: ICD-10-CM

## 2025-08-06 DIAGNOSIS — E11.22 TYPE 2 DIABETES MELLITUS WITH STAGE 2 CHRONIC KIDNEY DISEASE, WITHOUT LONG-TERM CURRENT USE OF INSULIN (HCC): ICD-10-CM

## 2025-08-06 DIAGNOSIS — R35.0 URINARY FREQUENCY: ICD-10-CM

## 2025-08-06 DIAGNOSIS — G30.1 MODERATE LATE ONSET ALZHEIMER'S DEMENTIA WITH AGITATION (HCC): ICD-10-CM

## 2025-08-06 DIAGNOSIS — Z85.3 PERSONAL HISTORY OF BREAST CANCER: ICD-10-CM

## 2025-08-06 DIAGNOSIS — E78.5 HYPERLIPIDEMIA ASSOCIATED WITH TYPE 2 DIABETES MELLITUS (HCC): ICD-10-CM

## 2025-08-06 DIAGNOSIS — F02.B11 MODERATE LATE ONSET ALZHEIMER'S DEMENTIA WITH AGITATION (HCC): ICD-10-CM

## 2025-08-06 LAB
ALBUMIN SERPL BCP-MCNC: 4.1 G/DL (ref 3.2–4.9)
ALBUMIN/GLOB SERPL: 1.4 G/DL
ALP SERPL-CCNC: 70 U/L (ref 30–99)
ALT SERPL-CCNC: 12 U/L (ref 2–50)
ANION GAP SERPL CALC-SCNC: 15 MMOL/L (ref 7–16)
AST SERPL-CCNC: 13 U/L (ref 12–45)
BASOPHILS # BLD AUTO: 0.9 % (ref 0–1.8)
BASOPHILS # BLD: 0.1 K/UL (ref 0–0.12)
BILIRUB SERPL-MCNC: 0.5 MG/DL (ref 0.1–1.5)
BUN SERPL-MCNC: 12 MG/DL (ref 8–22)
CALCIUM ALBUM COR SERPL-MCNC: 9.5 MG/DL (ref 8.5–10.5)
CALCIUM SERPL-MCNC: 9.6 MG/DL (ref 8.5–10.5)
CHLORIDE SERPL-SCNC: 97 MMOL/L (ref 96–112)
CHOLEST SERPL-MCNC: 157 MG/DL (ref 100–199)
CO2 SERPL-SCNC: 20 MMOL/L (ref 20–33)
CREAT SERPL-MCNC: 0.67 MG/DL (ref 0.5–1.4)
EOSINOPHIL # BLD AUTO: 0.24 K/UL (ref 0–0.51)
EOSINOPHIL NFR BLD: 2 % (ref 0–6.9)
ERYTHROCYTE [DISTWIDTH] IN BLOOD BY AUTOMATED COUNT: 43.3 FL (ref 35.9–50)
FASTING STATUS PATIENT QL REPORTED: NORMAL
GFR SERPLBLD CREATININE-BSD FMLA CKD-EPI: 87 ML/MIN/1.73 M 2
GLOBULIN SER CALC-MCNC: 3 G/DL (ref 1.9–3.5)
GLUCOSE SERPL-MCNC: 228 MG/DL (ref 65–99)
HBA1C MFR BLD: 9.6 % (ref ?–5.8)
HCT VFR BLD AUTO: 42.9 % (ref 37–47)
HDLC SERPL-MCNC: 44 MG/DL
HGB BLD-MCNC: 14.3 G/DL (ref 12–16)
IMM GRANULOCYTES # BLD AUTO: 0.07 K/UL (ref 0–0.11)
IMM GRANULOCYTES NFR BLD AUTO: 0.6 % (ref 0–0.9)
LDLC SERPL CALC-MCNC: 82 MG/DL
LYMPHOCYTES # BLD AUTO: 2.38 K/UL (ref 1–4.8)
LYMPHOCYTES NFR BLD: 20.3 % (ref 22–41)
MCH RBC QN AUTO: 31.2 PG (ref 27–33)
MCHC RBC AUTO-ENTMCNC: 33.3 G/DL (ref 32.2–35.5)
MCV RBC AUTO: 93.7 FL (ref 81.4–97.8)
MONOCYTES # BLD AUTO: 0.62 K/UL (ref 0–0.85)
MONOCYTES NFR BLD AUTO: 5.3 % (ref 0–13.4)
NEUTROPHILS # BLD AUTO: 8.3 K/UL (ref 1.82–7.42)
NEUTROPHILS NFR BLD: 70.9 % (ref 44–72)
NRBC # BLD AUTO: 0 K/UL
NRBC BLD-RTO: 0 /100 WBC (ref 0–0.2)
PLATELET # BLD AUTO: 535 K/UL (ref 164–446)
PMV BLD AUTO: 9.3 FL (ref 9–12.9)
POCT INT CON NEG: NEGATIVE
POCT INT CON POS: POSITIVE
POTASSIUM SERPL-SCNC: 4 MMOL/L (ref 3.6–5.5)
PROT SERPL-MCNC: 7.1 G/DL (ref 6–8.2)
RBC # BLD AUTO: 4.58 M/UL (ref 4.2–5.4)
SODIUM SERPL-SCNC: 132 MMOL/L (ref 135–145)
T4 FREE SERPL-MCNC: 1.11 NG/DL (ref 0.93–1.7)
TRIGL SERPL-MCNC: 153 MG/DL (ref 0–149)
TSH SERPL-ACNC: 2.02 UIU/ML (ref 0.38–5.33)
WBC # BLD AUTO: 11.7 K/UL (ref 4.8–10.8)

## 2025-08-06 PROCEDURE — 36415 COLL VENOUS BLD VENIPUNCTURE: CPT

## 2025-08-06 PROCEDURE — 80053 COMPREHEN METABOLIC PANEL: CPT

## 2025-08-06 PROCEDURE — 84439 ASSAY OF FREE THYROXINE: CPT

## 2025-08-06 PROCEDURE — 99214 OFFICE O/P EST MOD 30 MIN: CPT | Mod: 25 | Performed by: FAMILY MEDICINE

## 2025-08-06 PROCEDURE — 80061 LIPID PANEL: CPT

## 2025-08-06 PROCEDURE — 3074F SYST BP LT 130 MM HG: CPT | Performed by: FAMILY MEDICINE

## 2025-08-06 PROCEDURE — G0439 PPPS, SUBSEQ VISIT: HCPCS | Performed by: FAMILY MEDICINE

## 2025-08-06 PROCEDURE — 85025 COMPLETE CBC W/AUTO DIFF WBC: CPT

## 2025-08-06 PROCEDURE — 3078F DIAST BP <80 MM HG: CPT | Performed by: FAMILY MEDICINE

## 2025-08-06 PROCEDURE — 83036 HEMOGLOBIN GLYCOSYLATED A1C: CPT | Performed by: FAMILY MEDICINE

## 2025-08-06 PROCEDURE — 84443 ASSAY THYROID STIM HORMONE: CPT

## 2025-08-06 ASSESSMENT — FIBROSIS 4 INDEX: FIB4 SCORE: 0.48

## 2025-08-06 ASSESSMENT — ACTIVITIES OF DAILY LIVING (ADL): BATHING_REQUIRES_ASSISTANCE: 0

## 2025-08-06 ASSESSMENT — ENCOUNTER SYMPTOMS: GENERAL WELL-BEING: GOOD

## 2025-08-06 ASSESSMENT — PATIENT HEALTH QUESTIONNAIRE - PHQ9: CLINICAL INTERPRETATION OF PHQ2 SCORE: 0

## 2025-08-07 ENCOUNTER — PATIENT MESSAGE (OUTPATIENT)
Dept: MEDICAL GROUP | Facility: PHYSICIAN GROUP | Age: 80
End: 2025-08-07
Payer: MEDICARE

## 2025-08-07 DIAGNOSIS — E11.22 TYPE 2 DIABETES MELLITUS WITH STAGE 2 CHRONIC KIDNEY DISEASE, WITHOUT LONG-TERM CURRENT USE OF INSULIN (HCC): ICD-10-CM

## 2025-08-07 DIAGNOSIS — N18.2 TYPE 2 DIABETES MELLITUS WITH STAGE 2 CHRONIC KIDNEY DISEASE, WITHOUT LONG-TERM CURRENT USE OF INSULIN (HCC): ICD-10-CM

## 2025-08-23 DIAGNOSIS — N18.2 TYPE 2 DIABETES MELLITUS WITH STAGE 2 CHRONIC KIDNEY DISEASE, WITHOUT LONG-TERM CURRENT USE OF INSULIN (HCC): Primary | ICD-10-CM

## 2025-08-23 DIAGNOSIS — E78.5 HYPERLIPIDEMIA ASSOCIATED WITH TYPE 2 DIABETES MELLITUS (HCC): ICD-10-CM

## 2025-08-23 DIAGNOSIS — E11.22 TYPE 2 DIABETES MELLITUS WITH STAGE 2 CHRONIC KIDNEY DISEASE, WITHOUT LONG-TERM CURRENT USE OF INSULIN (HCC): Primary | ICD-10-CM

## 2025-08-23 DIAGNOSIS — E11.69 HYPERLIPIDEMIA ASSOCIATED WITH TYPE 2 DIABETES MELLITUS (HCC): ICD-10-CM

## 2025-08-25 RX ORDER — METFORMIN HYDROCHLORIDE 500 MG/1
TABLET, EXTENDED RELEASE ORAL
Qty: 360 TABLET | Refills: 1 | Status: SHIPPED | OUTPATIENT
Start: 2025-08-25

## 2025-08-25 RX ORDER — GLIPIZIDE 5 MG/1
5 TABLET ORAL 2 TIMES DAILY
Qty: 180 TABLET | Refills: 1 | Status: SHIPPED | OUTPATIENT
Start: 2025-08-25

## 2025-08-28 ENCOUNTER — APPOINTMENT (OUTPATIENT)
Dept: MEDICAL GROUP | Facility: PHYSICIAN GROUP | Age: 80
End: 2025-08-28
Payer: MEDICARE